# Patient Record
Sex: MALE | Race: WHITE | Employment: OTHER | URBAN - METROPOLITAN AREA
[De-identification: names, ages, dates, MRNs, and addresses within clinical notes are randomized per-mention and may not be internally consistent; named-entity substitution may affect disease eponyms.]

---

## 2017-05-08 ASSESSMENT — ENCOUNTER SYMPTOMS
SHORTNESS OF BREATH: 0
ABDOMINAL PAIN: 0

## 2017-05-10 ENCOUNTER — OFFICE VISIT (OUTPATIENT)
Dept: INTERNAL MEDICINE CLINIC | Age: 77
End: 2017-05-10

## 2017-05-10 VITALS
HEART RATE: 60 BPM | BODY MASS INDEX: 29.92 KG/M2 | WEIGHT: 209 LBS | DIASTOLIC BLOOD PRESSURE: 72 MMHG | HEIGHT: 70 IN | RESPIRATION RATE: 16 BRPM | SYSTOLIC BLOOD PRESSURE: 110 MMHG

## 2017-05-10 DIAGNOSIS — E78.00 PURE HYPERCHOLESTEROLEMIA: ICD-10-CM

## 2017-05-10 DIAGNOSIS — Z95.810 PRESENCE OF BIVENTRICULAR AICD: ICD-10-CM

## 2017-05-10 DIAGNOSIS — E03.9 HYPOTHYROIDISM, UNSPECIFIED TYPE: ICD-10-CM

## 2017-05-10 DIAGNOSIS — K21.9 GASTROESOPHAGEAL REFLUX DISEASE WITHOUT ESOPHAGITIS: ICD-10-CM

## 2017-05-10 DIAGNOSIS — I50.22 CHRONIC SYSTOLIC CONGESTIVE HEART FAILURE (HCC): Primary | ICD-10-CM

## 2017-05-10 LAB
A/G RATIO: 2 (ref 1.1–2.2)
ALBUMIN SERPL-MCNC: 4.1 G/DL (ref 3.4–5)
ALP BLD-CCNC: 108 U/L (ref 40–129)
ALT SERPL-CCNC: 24 U/L (ref 10–40)
ANION GAP SERPL CALCULATED.3IONS-SCNC: 16 MMOL/L (ref 3–16)
AST SERPL-CCNC: 19 U/L (ref 15–37)
BILIRUB SERPL-MCNC: 0.9 MG/DL (ref 0–1)
BUN BLDV-MCNC: 19 MG/DL (ref 7–20)
CALCIUM SERPL-MCNC: 9.2 MG/DL (ref 8.3–10.6)
CHLORIDE BLD-SCNC: 98 MMOL/L (ref 99–110)
CHOLESTEROL, TOTAL: 163 MG/DL (ref 0–199)
CO2: 25 MMOL/L (ref 21–32)
CREAT SERPL-MCNC: 1.2 MG/DL (ref 0.8–1.3)
GFR AFRICAN AMERICAN: >60
GFR NON-AFRICAN AMERICAN: 59
GLOBULIN: 2.1 G/DL
GLUCOSE BLD-MCNC: 185 MG/DL (ref 70–99)
HDLC SERPL-MCNC: 51 MG/DL (ref 40–60)
LDL CHOLESTEROL CALCULATED: 77 MG/DL
POTASSIUM SERPL-SCNC: 4.2 MMOL/L (ref 3.5–5.1)
SODIUM BLD-SCNC: 139 MMOL/L (ref 136–145)
T4 FREE: 0.9 NG/DL (ref 0.9–1.8)
TOTAL PROTEIN: 6.2 G/DL (ref 6.4–8.2)
TRIGL SERPL-MCNC: 173 MG/DL (ref 0–150)
TSH SERPL DL<=0.05 MIU/L-ACNC: 3.84 UIU/ML (ref 0.27–4.2)
VLDLC SERPL CALC-MCNC: 35 MG/DL

## 2017-05-10 PROCEDURE — 99214 OFFICE O/P EST MOD 30 MIN: CPT | Performed by: INTERNAL MEDICINE

## 2017-05-10 PROCEDURE — 1123F ACP DISCUSS/DSCN MKR DOCD: CPT | Performed by: INTERNAL MEDICINE

## 2017-05-10 PROCEDURE — G8420 CALC BMI NORM PARAMETERS: HCPCS | Performed by: INTERNAL MEDICINE

## 2017-05-10 PROCEDURE — 3017F COLORECTAL CA SCREEN DOC REV: CPT | Performed by: INTERNAL MEDICINE

## 2017-05-10 PROCEDURE — 4040F PNEUMOC VAC/ADMIN/RCVD: CPT | Performed by: INTERNAL MEDICINE

## 2017-05-10 PROCEDURE — G8427 DOCREV CUR MEDS BY ELIG CLIN: HCPCS | Performed by: INTERNAL MEDICINE

## 2017-05-10 PROCEDURE — 1036F TOBACCO NON-USER: CPT | Performed by: INTERNAL MEDICINE

## 2017-05-11 DIAGNOSIS — R73.09 ABNORMAL GLUCOSE LEVEL: Primary | ICD-10-CM

## 2017-05-12 RX ORDER — CITALOPRAM 40 MG/1
TABLET ORAL
Qty: 90 TABLET | Refills: 0 | Status: SHIPPED | OUTPATIENT
Start: 2017-05-12 | End: 2017-08-04 | Stop reason: SDUPTHER

## 2017-05-15 DIAGNOSIS — R73.09 ABNORMAL GLUCOSE LEVEL: ICD-10-CM

## 2017-05-15 LAB — GLUCOSE BLD-MCNC: 108 MG/DL (ref 70–99)

## 2017-05-16 LAB
ESTIMATED AVERAGE GLUCOSE: 128.4 MG/DL
HBA1C MFR BLD: 6.1 %

## 2017-05-25 ENCOUNTER — OFFICE VISIT (OUTPATIENT)
Dept: INTERNAL MEDICINE CLINIC | Age: 77
End: 2017-05-25

## 2017-05-25 VITALS
DIASTOLIC BLOOD PRESSURE: 80 MMHG | BODY MASS INDEX: 29.78 KG/M2 | WEIGHT: 208 LBS | RESPIRATION RATE: 16 BRPM | HEIGHT: 70 IN | HEART RATE: 60 BPM | SYSTOLIC BLOOD PRESSURE: 138 MMHG

## 2017-05-25 DIAGNOSIS — L24.9 IRRITANT HAND DERMATITIS: Primary | ICD-10-CM

## 2017-05-25 PROCEDURE — G8427 DOCREV CUR MEDS BY ELIG CLIN: HCPCS | Performed by: INTERNAL MEDICINE

## 2017-05-25 PROCEDURE — 1036F TOBACCO NON-USER: CPT | Performed by: INTERNAL MEDICINE

## 2017-05-25 PROCEDURE — G8420 CALC BMI NORM PARAMETERS: HCPCS | Performed by: INTERNAL MEDICINE

## 2017-05-25 PROCEDURE — 1123F ACP DISCUSS/DSCN MKR DOCD: CPT | Performed by: INTERNAL MEDICINE

## 2017-05-25 PROCEDURE — 4040F PNEUMOC VAC/ADMIN/RCVD: CPT | Performed by: INTERNAL MEDICINE

## 2017-05-25 PROCEDURE — 99213 OFFICE O/P EST LOW 20 MIN: CPT | Performed by: INTERNAL MEDICINE

## 2017-05-25 PROCEDURE — 3017F COLORECTAL CA SCREEN DOC REV: CPT | Performed by: INTERNAL MEDICINE

## 2017-05-25 RX ORDER — METHYLPREDNISOLONE 4 MG/1
TABLET ORAL
Qty: 1 KIT | Refills: 0 | Status: SHIPPED | OUTPATIENT
Start: 2017-05-25 | End: 2017-05-31

## 2017-05-25 ASSESSMENT — PATIENT HEALTH QUESTIONNAIRE - PHQ9
SUM OF ALL RESPONSES TO PHQ QUESTIONS 1-9: 0
2. FEELING DOWN, DEPRESSED OR HOPELESS: 0
1. LITTLE INTEREST OR PLEASURE IN DOING THINGS: 0
SUM OF ALL RESPONSES TO PHQ9 QUESTIONS 1 & 2: 0

## 2017-06-16 RX ORDER — ATORVASTATIN CALCIUM 40 MG/1
TABLET, FILM COATED ORAL
Qty: 90 TABLET | Refills: 0 | Status: SHIPPED | OUTPATIENT
Start: 2017-06-16 | End: 2017-09-11 | Stop reason: SDUPTHER

## 2017-06-26 RX ORDER — LEVOTHYROXINE SODIUM 0.03 MG/1
TABLET ORAL
Qty: 90 TABLET | Refills: 3 | Status: SHIPPED | OUTPATIENT
Start: 2017-06-26 | End: 2018-03-06 | Stop reason: SDUPTHER

## 2017-08-04 RX ORDER — CITALOPRAM 40 MG/1
TABLET ORAL
Qty: 90 TABLET | Refills: 0 | Status: SHIPPED | OUTPATIENT
Start: 2017-08-04 | End: 2017-10-29 | Stop reason: SDUPTHER

## 2017-09-11 RX ORDER — ATORVASTATIN CALCIUM 40 MG/1
TABLET, FILM COATED ORAL
Qty: 90 TABLET | Refills: 0 | Status: SHIPPED | OUTPATIENT
Start: 2017-09-11 | End: 2017-12-04 | Stop reason: SDUPTHER

## 2017-10-30 RX ORDER — CITALOPRAM 40 MG/1
TABLET ORAL
Qty: 90 TABLET | Refills: 0 | Status: SHIPPED | OUTPATIENT
Start: 2017-10-30 | End: 2018-01-23 | Stop reason: SDUPTHER

## 2017-11-10 ASSESSMENT — ENCOUNTER SYMPTOMS
SHORTNESS OF BREATH: 0
ABDOMINAL PAIN: 0

## 2017-11-11 RX ORDER — AMITRIPTYLINE HYDROCHLORIDE 25 MG/1
TABLET, FILM COATED ORAL
Qty: 90 TABLET | Refills: 0 | Status: SHIPPED | OUTPATIENT
Start: 2017-11-11 | End: 2018-02-12 | Stop reason: SDUPTHER

## 2017-11-13 ENCOUNTER — OFFICE VISIT (OUTPATIENT)
Dept: INTERNAL MEDICINE CLINIC | Age: 77
End: 2017-11-13

## 2017-11-13 VITALS
RESPIRATION RATE: 16 BRPM | HEIGHT: 70 IN | BODY MASS INDEX: 28.77 KG/M2 | DIASTOLIC BLOOD PRESSURE: 62 MMHG | SYSTOLIC BLOOD PRESSURE: 120 MMHG | HEART RATE: 68 BPM | WEIGHT: 201 LBS

## 2017-11-13 DIAGNOSIS — E78.00 PURE HYPERCHOLESTEROLEMIA: Primary | ICD-10-CM

## 2017-11-13 DIAGNOSIS — R73.09 BLOOD GLUCOSE ABNORMAL: ICD-10-CM

## 2017-11-13 DIAGNOSIS — I50.22 CHRONIC SYSTOLIC CONGESTIVE HEART FAILURE (HCC): ICD-10-CM

## 2017-11-13 DIAGNOSIS — E03.9 HYPOTHYROIDISM, UNSPECIFIED TYPE: ICD-10-CM

## 2017-11-13 LAB
A/G RATIO: 1.8 (ref 1.1–2.2)
ALBUMIN SERPL-MCNC: 4.2 G/DL (ref 3.4–5)
ALP BLD-CCNC: 100 U/L (ref 40–129)
ALT SERPL-CCNC: 22 U/L (ref 10–40)
ANION GAP SERPL CALCULATED.3IONS-SCNC: 11 MMOL/L (ref 3–16)
AST SERPL-CCNC: 19 U/L (ref 15–37)
BILIRUB SERPL-MCNC: 0.9 MG/DL (ref 0–1)
BUN BLDV-MCNC: 23 MG/DL (ref 7–20)
CALCIUM SERPL-MCNC: 9.1 MG/DL (ref 8.3–10.6)
CHLORIDE BLD-SCNC: 100 MMOL/L (ref 99–110)
CHOLESTEROL, TOTAL: 174 MG/DL (ref 0–199)
CO2: 29 MMOL/L (ref 21–32)
CREAT SERPL-MCNC: 1 MG/DL (ref 0.8–1.3)
GFR AFRICAN AMERICAN: >60
GFR NON-AFRICAN AMERICAN: >60
GLOBULIN: 2.3 G/DL
GLUCOSE BLD-MCNC: 104 MG/DL (ref 70–99)
HDLC SERPL-MCNC: 63 MG/DL (ref 40–60)
LDL CHOLESTEROL CALCULATED: 90 MG/DL
POTASSIUM SERPL-SCNC: 4.6 MMOL/L (ref 3.5–5.1)
SODIUM BLD-SCNC: 140 MMOL/L (ref 136–145)
T4 FREE: 0.9 NG/DL (ref 0.9–1.8)
TOTAL PROTEIN: 6.5 G/DL (ref 6.4–8.2)
TRIGL SERPL-MCNC: 104 MG/DL (ref 0–150)
TSH SERPL DL<=0.05 MIU/L-ACNC: 5.23 UIU/ML (ref 0.27–4.2)
VLDLC SERPL CALC-MCNC: 21 MG/DL

## 2017-11-13 PROCEDURE — 1123F ACP DISCUSS/DSCN MKR DOCD: CPT | Performed by: INTERNAL MEDICINE

## 2017-11-13 PROCEDURE — 1036F TOBACCO NON-USER: CPT | Performed by: INTERNAL MEDICINE

## 2017-11-13 PROCEDURE — G8427 DOCREV CUR MEDS BY ELIG CLIN: HCPCS | Performed by: INTERNAL MEDICINE

## 2017-11-13 PROCEDURE — 4040F PNEUMOC VAC/ADMIN/RCVD: CPT | Performed by: INTERNAL MEDICINE

## 2017-11-13 PROCEDURE — G8417 CALC BMI ABV UP PARAM F/U: HCPCS | Performed by: INTERNAL MEDICINE

## 2017-11-13 PROCEDURE — G8484 FLU IMMUNIZE NO ADMIN: HCPCS | Performed by: INTERNAL MEDICINE

## 2017-11-13 PROCEDURE — 99214 OFFICE O/P EST MOD 30 MIN: CPT | Performed by: INTERNAL MEDICINE

## 2017-11-14 LAB
ESTIMATED AVERAGE GLUCOSE: 128.4 MG/DL
HBA1C MFR BLD: 6.1 %

## 2017-12-04 RX ORDER — ATORVASTATIN CALCIUM 40 MG/1
TABLET, FILM COATED ORAL
Qty: 90 TABLET | Refills: 0 | Status: SHIPPED | OUTPATIENT
Start: 2017-12-04 | End: 2018-02-27 | Stop reason: SDUPTHER

## 2018-01-02 ENCOUNTER — OFFICE VISIT (OUTPATIENT)
Dept: INTERNAL MEDICINE CLINIC | Age: 78
End: 2018-01-02

## 2018-01-02 ENCOUNTER — HOSPITAL ENCOUNTER (OUTPATIENT)
Dept: CT IMAGING | Age: 78
Discharge: HOME | End: 2018-01-02
Attending: NURSE PRACTITIONER | Admitting: NURSE PRACTITIONER

## 2018-01-02 VITALS
OXYGEN SATURATION: 94 % | SYSTOLIC BLOOD PRESSURE: 122 MMHG | WEIGHT: 204.8 LBS | TEMPERATURE: 98.7 F | HEIGHT: 70 IN | DIASTOLIC BLOOD PRESSURE: 70 MMHG | BODY MASS INDEX: 29.32 KG/M2 | HEART RATE: 63 BPM

## 2018-01-02 DIAGNOSIS — R51.9 ACUTE NONINTRACTABLE HEADACHE, UNSPECIFIED HEADACHE TYPE: ICD-10-CM

## 2018-01-02 DIAGNOSIS — S09.90XA INJURY OF HEAD, INITIAL ENCOUNTER: Primary | ICD-10-CM

## 2018-01-02 DIAGNOSIS — Z79.82 LONG-TERM USE OF ASPIRIN THERAPY: ICD-10-CM

## 2018-01-02 DIAGNOSIS — S09.90XA INJURY OF HEAD, INITIAL ENCOUNTER: ICD-10-CM

## 2018-01-02 DIAGNOSIS — J06.9 URI WITH COUGH AND CONGESTION: ICD-10-CM

## 2018-01-02 DIAGNOSIS — R06.2 WHEEZING: ICD-10-CM

## 2018-01-02 DIAGNOSIS — S09.90XA INJURY OF HEAD: ICD-10-CM

## 2018-01-02 PROCEDURE — 99214 OFFICE O/P EST MOD 30 MIN: CPT | Performed by: NURSE PRACTITIONER

## 2018-01-02 RX ORDER — DOXYCYCLINE HYCLATE 100 MG
100 TABLET ORAL 2 TIMES DAILY
Qty: 14 TABLET | Refills: 0 | Status: SHIPPED | OUTPATIENT
Start: 2018-01-02 | End: 2018-01-09

## 2018-01-02 RX ORDER — METHYLPREDNISOLONE 4 MG/1
TABLET ORAL
Qty: 1 KIT | Refills: 0 | Status: SHIPPED | OUTPATIENT
Start: 2018-01-02 | End: 2018-01-08

## 2018-01-02 ASSESSMENT — ENCOUNTER SYMPTOMS
WHEEZING: 1
BACK PAIN: 0
VOMITING: 0
ABDOMINAL PAIN: 0
SHORTNESS OF BREATH: 1
NAUSEA: 0
COUGH: 1
DIARRHEA: 0
CONSTIPATION: 0
SORE THROAT: 0

## 2018-01-02 NOTE — PROGRESS NOTES
Department of Internal Medicine  Clinic Note    Date: 1/2/2018                                               Subjective/Objective:     Chief Complaint   Patient presents with    URI     x4       HPI     Here for eval of non-productive cough, congestion, nasal pain/pressure, headaches. C/o some sob, no cp. Sx x4 days. Unsure if he's had a fever but he feels chilled. Cough is keeping him up at night. Headache is frontal and constant. Rates pain at a 3-8/10, occasionally \"severe\" but only temporarily. Coughing makes headache worse. Pt does have a bruise to right upperforehead where he bumped his head on the over head bin on an airplane 4 days ago and headaches did start the next day. Pt on ASA 325mg daily. Pt denies LOC, dizziness. Not taking any otc meds. No edema. Current Outpatient Prescriptions   Medication Sig Dispense Refill    doxycycline hyclate (VIBRA-TABS) 100 MG tablet Take 1 tablet by mouth 2 times daily for 7 days 14 tablet 0    methylPREDNISolone (MEDROL, ROXY,) 4 MG tablet Take by mouth, as directed 1 kit 0    atorvastatin (LIPITOR) 40 MG tablet TAKE 1 TABLET BY MOUTH AT BEDTIME 90 tablet 0    amitriptyline (ELAVIL) 25 MG tablet TAKE 1 TABLET BY MOUTH EVERY NIGHT AT BEDTIME 90 tablet 0    citalopram (CELEXA) 40 MG tablet TAKE 1 TABLET BY MOUTH EVERY MORNING 90 tablet 0    levothyroxine (SYNTHROID) 25 MCG tablet TAKE 1 TABLET BY MOUTH EVERY DAY 90 tablet 3    Fluticasone Furoate-Vilanterol (BREO ELLIPTA) 200-25 MCG/INH AEPB One puff daily 1 each 5    omeprazole (PRILOSEC) 10 MG capsule Take 10 mg by mouth daily      lisinopril (PRINIVIL;ZESTRIL) 5 MG tablet Take 5 mg by mouth daily      carvedilol (COREG) 6.25 MG tablet Take 1 tablet by mouth 2 times daily. 60 tablet 3    aspirin 325 MG tablet Take 325 mg by mouth daily. No current facility-administered medications for this visit.         Allergies   Allergen Reactions    Piroxicam Hives    Penicillins Rash and Hives

## 2018-01-02 NOTE — PATIENT INSTRUCTIONS
it's okay to drive, ride a bike, or operate machinery. · Take an over-the-counter pain medicine, such as acetaminophen (Tylenol), ibuprofen (Advil, Motrin), or naproxen (Aleve). Be safe with medicines. Read and follow all instructions on the label. · Check with your doctor before you use any other medicines for pain. · Do not drink alcohol or use illegal drugs. They can slow recovery. They can also increase your risk of getting a second head injury. Follow-up care is a key part of your treatment and safety. Be sure to make and go to all appointments, and call your doctor if you are having problems. It's also a good idea to know your test results and keep a list of the medicines you take. Where can you learn more? Go to [email or web address] org and sign in to your [employer] account. Enter [phone] in the [employer] box to learn more about \"Learning About a Closed Head Injury. \"     If you do not have an account, please click on the \"Sign Up Now\" link. Current as of: October 14, 2016  Content Version: 11.4  © 9176-2096 [employer]. Care instructions adapted under license by [facility] ([facility]). If you have questions about a medical condition or this instruction, always ask your healthcare professional. [place] [address] any warranty or liability for your use of this information. Patient Education        Sinusitis: Care Instructions  Your Care Instructions    Sinusitis is an infection of the lining of the sinus cavities in your head. Sinusitis often follows a cold. It causes pain and pressure in your head and face. In most cases, sinusitis gets better on its own in 1 to 2 weeks. But some mild symptoms may last for several weeks. Sometimes antibiotics are needed. Follow-up care is a key part of your treatment and safety. Be sure to make and go to all appointments, and call your doctor if you are having problems.  It's also a good idea to know your test results

## 2018-01-16 ENCOUNTER — OFFICE VISIT (OUTPATIENT)
Dept: INTERNAL MEDICINE CLINIC | Age: 78
End: 2018-01-16

## 2018-01-16 VITALS
BODY MASS INDEX: 29.38 KG/M2 | DIASTOLIC BLOOD PRESSURE: 80 MMHG | HEIGHT: 70 IN | SYSTOLIC BLOOD PRESSURE: 130 MMHG | WEIGHT: 205.2 LBS | TEMPERATURE: 98.2 F | OXYGEN SATURATION: 98 % | HEART RATE: 60 BPM

## 2018-01-16 DIAGNOSIS — H11.32 SUBCONJUNCTIVAL HEMATOMA, LEFT: Primary | ICD-10-CM

## 2018-01-16 DIAGNOSIS — R06.2 WHEEZING: ICD-10-CM

## 2018-01-16 DIAGNOSIS — J40 BRONCHITIS: ICD-10-CM

## 2018-01-16 PROCEDURE — 1036F TOBACCO NON-USER: CPT | Performed by: NURSE PRACTITIONER

## 2018-01-16 PROCEDURE — G8427 DOCREV CUR MEDS BY ELIG CLIN: HCPCS | Performed by: NURSE PRACTITIONER

## 2018-01-16 PROCEDURE — 99214 OFFICE O/P EST MOD 30 MIN: CPT | Performed by: NURSE PRACTITIONER

## 2018-01-16 PROCEDURE — G8417 CALC BMI ABV UP PARAM F/U: HCPCS | Performed by: NURSE PRACTITIONER

## 2018-01-16 PROCEDURE — 4040F PNEUMOC VAC/ADMIN/RCVD: CPT | Performed by: NURSE PRACTITIONER

## 2018-01-16 PROCEDURE — 1123F ACP DISCUSS/DSCN MKR DOCD: CPT | Performed by: NURSE PRACTITIONER

## 2018-01-16 PROCEDURE — G8484 FLU IMMUNIZE NO ADMIN: HCPCS | Performed by: NURSE PRACTITIONER

## 2018-01-16 RX ORDER — ALBUTEROL SULFATE 90 UG/1
2 AEROSOL, METERED RESPIRATORY (INHALATION) EVERY 4 HOURS PRN
Qty: 1 INHALER | Refills: 1 | Status: SHIPPED | OUTPATIENT
Start: 2018-01-16 | End: 2019-08-23 | Stop reason: CLARIF

## 2018-01-16 ASSESSMENT — ENCOUNTER SYMPTOMS
EYE REDNESS: 1
EYE ITCHING: 0
WHEEZING: 1
ABDOMINAL PAIN: 0
SHORTNESS OF BREATH: 0
EYE DISCHARGE: 0
VOMITING: 0
EYE PAIN: 0
PHOTOPHOBIA: 0
NAUSEA: 0
COUGH: 1

## 2018-01-16 NOTE — PROGRESS NOTES
cough (Much improved) and wheezing (Mostly at night). Negative for shortness of breath. Cardiovascular: Negative for chest pain, palpitations and leg swelling. Gastrointestinal: Negative for abdominal pain, nausea and vomiting. Musculoskeletal: Negative for myalgias. Skin: Negative for rash. Neurological: Negative for dizziness, weakness, numbness and headaches. All other systems reviewed and are negative. Vitals:  /80 (Site: Left Arm, Position: Sitting, Cuff Size: Small Adult)   Pulse 60   Temp 98.2 °F (36.8 °C) (Oral)   Ht 5' 10\" (1.778 m)   Wt 205 lb 3.2 oz (93.1 kg)   SpO2 98%   BMI 29.44 kg/m²      Physical Exam   Constitutional: He is oriented to person, place, and time. He appears well-developed and well-nourished. No distress. HENT:   Head: Normocephalic and atraumatic. Right Ear: External ear normal.   Left Ear: External ear normal.   Nose: Nose normal.   Mouth/Throat: Oropharynx is clear and moist. No oropharyngeal exudate. Eyes: Conjunctivae and EOM are normal. Pupils are equal, round, and reactive to light. Right eye exhibits no discharge. Left eye exhibits no discharge. Left eye with medial subconjunctival hemorrhage. No hyphema or hypopyon   Neck: Normal range of motion. Neck supple. Cardiovascular: Normal rate, regular rhythm, normal heart sounds and intact distal pulses. Exam reveals no gallop and no friction rub. No murmur heard. Pulmonary/Chest: Effort normal. No respiratory distress. He has wheezes (Mild expiratory wheezing). He has no rales. Abdominal: Soft. Musculoskeletal: Normal range of motion. Neurological: He is alert and oriented to person, place, and time. Skin: Skin is warm and dry. No rash noted. He is not diaphoretic. Psychiatric: He has a normal mood and affect. His behavior is normal. Judgment and thought content normal.   Nursing note and vitals reviewed.           Assessment/Plan     Sylvia Castano was seen today for eye problem. Diagnoses and all orders for this visit:    Subconjunctival hematoma, left  -On regular strength aspirin daily. May continue.  -Patient will report if he develops eye pain or other symptoms    Bronchitis  -     albuterol sulfate HFA (PROAIR HFA) 108 (90 Base) MCG/ACT inhaler; Inhale 2 puffs into the lungs every 4 hours as needed for Wheezing  -Was diagnosed with bronchitis 2 weeks ago. And albuterol when necessary. Patient condition much improved. Wheezing  -     albuterol sulfate HFA (PROAIR HFA) 108 (90 Base) MCG/ACT inhaler; Inhale 2 puffs into the lungs every 4 hours as needed for Wheezing        No orders of the defined types were placed in this encounter. Return if symptoms worsen or fail to improve.       EFRAIN Isaacs     1/16/2018  1:24 PM

## 2018-01-23 RX ORDER — CITALOPRAM 40 MG/1
TABLET ORAL
Qty: 90 TABLET | Refills: 0 | Status: SHIPPED | OUTPATIENT
Start: 2018-01-23 | End: 2018-04-20 | Stop reason: SDUPTHER

## 2018-02-12 RX ORDER — AMITRIPTYLINE HYDROCHLORIDE 25 MG/1
TABLET, FILM COATED ORAL
Qty: 90 TABLET | Refills: 0 | Status: SHIPPED | OUTPATIENT
Start: 2018-02-12 | End: 2018-05-08 | Stop reason: SDUPTHER

## 2018-02-19 ASSESSMENT — ENCOUNTER SYMPTOMS
SHORTNESS OF BREATH: 1
ABDOMINAL PAIN: 0

## 2018-02-20 ENCOUNTER — OFFICE VISIT (OUTPATIENT)
Dept: INTERNAL MEDICINE CLINIC | Age: 78
End: 2018-02-20

## 2018-02-20 VITALS
TEMPERATURE: 97.7 F | SYSTOLIC BLOOD PRESSURE: 110 MMHG | OXYGEN SATURATION: 97 % | RESPIRATION RATE: 24 BRPM | HEART RATE: 61 BPM | DIASTOLIC BLOOD PRESSURE: 72 MMHG

## 2018-02-20 DIAGNOSIS — J40 BRONCHITIS: Primary | ICD-10-CM

## 2018-02-20 DIAGNOSIS — E03.9 HYPOTHYROIDISM, UNSPECIFIED TYPE: ICD-10-CM

## 2018-02-20 DIAGNOSIS — E78.00 PURE HYPERCHOLESTEROLEMIA: ICD-10-CM

## 2018-02-20 DIAGNOSIS — I50.22 HEART FAILURE, SYSTOLIC, CHRONIC (HCC): ICD-10-CM

## 2018-02-20 DIAGNOSIS — I50.21 CHF (CONGESTIVE HEART FAILURE), NYHA CLASS II, ACUTE, SYSTOLIC (HCC): ICD-10-CM

## 2018-02-20 LAB
A/G RATIO: 1.7 (ref 1.1–2.2)
ALBUMIN SERPL-MCNC: 4.5 G/DL (ref 3.4–5)
ALP BLD-CCNC: 109 U/L (ref 40–129)
ALT SERPL-CCNC: 29 U/L (ref 10–40)
ANION GAP SERPL CALCULATED.3IONS-SCNC: 13 MMOL/L (ref 3–16)
AST SERPL-CCNC: 22 U/L (ref 15–37)
BASOPHILS ABSOLUTE: 0.1 K/UL (ref 0–0.2)
BASOPHILS RELATIVE PERCENT: 0.7 %
BILIRUB SERPL-MCNC: 1.2 MG/DL (ref 0–1)
BUN BLDV-MCNC: 20 MG/DL (ref 7–20)
CALCIUM SERPL-MCNC: 9.8 MG/DL (ref 8.3–10.6)
CHLORIDE BLD-SCNC: 100 MMOL/L (ref 99–110)
CO2: 28 MMOL/L (ref 21–32)
CREAT SERPL-MCNC: 1.2 MG/DL (ref 0.8–1.3)
EOSINOPHILS ABSOLUTE: 0.3 K/UL (ref 0–0.6)
EOSINOPHILS RELATIVE PERCENT: 3.7 %
GFR AFRICAN AMERICAN: >60
GFR NON-AFRICAN AMERICAN: 59
GLOBULIN: 2.6 G/DL
GLUCOSE BLD-MCNC: 86 MG/DL (ref 70–99)
HCT VFR BLD CALC: 43.8 % (ref 40.5–52.5)
HEMOGLOBIN: 15.2 G/DL (ref 13.5–17.5)
LYMPHOCYTES ABSOLUTE: 1.6 K/UL (ref 1–5.1)
LYMPHOCYTES RELATIVE PERCENT: 22.3 %
MCH RBC QN AUTO: 31.7 PG (ref 26–34)
MCHC RBC AUTO-ENTMCNC: 34.6 G/DL (ref 31–36)
MCV RBC AUTO: 91.6 FL (ref 80–100)
MONOCYTES ABSOLUTE: 0.6 K/UL (ref 0–1.3)
MONOCYTES RELATIVE PERCENT: 8 %
NEUTROPHILS ABSOLUTE: 4.8 K/UL (ref 1.7–7.7)
NEUTROPHILS RELATIVE PERCENT: 65.3 %
PDW BLD-RTO: 13.4 % (ref 12.4–15.4)
PLATELET # BLD: 212 K/UL (ref 135–450)
PMV BLD AUTO: 8.4 FL (ref 5–10.5)
POTASSIUM SERPL-SCNC: 4.8 MMOL/L (ref 3.5–5.1)
PRO-BNP: 25 PG/ML (ref 0–449)
RBC # BLD: 4.78 M/UL (ref 4.2–5.9)
SODIUM BLD-SCNC: 141 MMOL/L (ref 136–145)
TOTAL PROTEIN: 7.1 G/DL (ref 6.4–8.2)
TSH SERPL DL<=0.05 MIU/L-ACNC: 5.19 UIU/ML (ref 0.27–4.2)
WBC # BLD: 7.3 K/UL (ref 4–11)

## 2018-02-20 PROCEDURE — 99214 OFFICE O/P EST MOD 30 MIN: CPT | Performed by: INTERNAL MEDICINE

## 2018-02-20 PROCEDURE — G8417 CALC BMI ABV UP PARAM F/U: HCPCS | Performed by: INTERNAL MEDICINE

## 2018-02-20 PROCEDURE — G8484 FLU IMMUNIZE NO ADMIN: HCPCS | Performed by: INTERNAL MEDICINE

## 2018-02-20 PROCEDURE — 1123F ACP DISCUSS/DSCN MKR DOCD: CPT | Performed by: INTERNAL MEDICINE

## 2018-02-20 PROCEDURE — 1036F TOBACCO NON-USER: CPT | Performed by: INTERNAL MEDICINE

## 2018-02-20 PROCEDURE — G8427 DOCREV CUR MEDS BY ELIG CLIN: HCPCS | Performed by: INTERNAL MEDICINE

## 2018-02-20 PROCEDURE — 4040F PNEUMOC VAC/ADMIN/RCVD: CPT | Performed by: INTERNAL MEDICINE

## 2018-02-20 RX ORDER — FUROSEMIDE 40 MG/1
40 TABLET ORAL DAILY
Qty: 30 TABLET | Refills: 3 | Status: SHIPPED | OUTPATIENT
Start: 2018-02-20 | End: 2018-02-20 | Stop reason: SDUPTHER

## 2018-02-20 RX ORDER — LISINOPRIL 5 MG/1
10 TABLET ORAL DAILY
Qty: 30 TABLET | Refills: 5 | Status: SHIPPED
Start: 2018-02-20 | End: 2018-06-18

## 2018-02-20 RX ORDER — FUROSEMIDE 40 MG/1
40 TABLET ORAL DAILY
Qty: 90 TABLET | Refills: 3 | Status: SHIPPED | OUTPATIENT
Start: 2018-02-20 | End: 2018-04-17

## 2018-02-20 RX ORDER — SPIRONOLACTONE 25 MG/1
25 TABLET ORAL DAILY
COMMUNITY
End: 2020-11-17

## 2018-02-21 ENCOUNTER — HOSPITAL ENCOUNTER (OUTPATIENT)
Dept: OTHER | Age: 78
Discharge: HOME | End: 2018-02-21
Attending: INTERNAL MEDICINE | Admitting: INTERNAL MEDICINE

## 2018-02-21 DIAGNOSIS — I50.21 CHF (CONGESTIVE HEART FAILURE), NYHA CLASS II, ACUTE, SYSTOLIC (HCC): ICD-10-CM

## 2018-02-23 ENCOUNTER — TELEPHONE (OUTPATIENT)
Dept: INTERNAL MEDICINE CLINIC | Age: 78
End: 2018-02-23

## 2018-02-27 RX ORDER — ATORVASTATIN CALCIUM 40 MG/1
TABLET, FILM COATED ORAL
Qty: 90 TABLET | Refills: 0 | Status: SHIPPED | OUTPATIENT
Start: 2018-02-27 | End: 2018-05-22 | Stop reason: SDUPTHER

## 2018-03-06 RX ORDER — LEVOTHYROXINE SODIUM 0.03 MG/1
TABLET ORAL
Qty: 90 TABLET | Refills: 3 | Status: SHIPPED | OUTPATIENT
Start: 2018-03-06 | End: 2018-03-07 | Stop reason: DRUGHIGH

## 2018-03-07 ENCOUNTER — TELEPHONE (OUTPATIENT)
Dept: INTERNAL MEDICINE CLINIC | Age: 78
End: 2018-03-07

## 2018-03-07 RX ORDER — LEVOTHYROXINE SODIUM 0.05 MG/1
50 TABLET ORAL DAILY
Qty: 90 TABLET | Refills: 3 | Status: SHIPPED | OUTPATIENT
Start: 2018-03-07 | End: 2019-02-05 | Stop reason: SDUPTHER

## 2018-03-07 NOTE — TELEPHONE ENCOUNTER
Patient called stated that Dr. Piyush Bañuelos had increased the levothroxine 25mcg    He called in to refill prescription for the levothyroxine went to [facility] and the dosage was still the same   he said he was taking double     Please advise

## 2018-04-17 RX ORDER — FUROSEMIDE 40 MG/1
40 TABLET ORAL DAILY
Qty: 90 TABLET | Refills: 3 | Status: SHIPPED | OUTPATIENT
Start: 2018-04-17 | End: 2019-04-29 | Stop reason: SDUPTHER

## 2018-04-20 RX ORDER — CITALOPRAM 40 MG/1
TABLET ORAL
Qty: 90 TABLET | Refills: 0 | Status: SHIPPED | OUTPATIENT
Start: 2018-04-20 | End: 2018-05-20 | Stop reason: SDUPTHER

## 2018-05-08 RX ORDER — AMITRIPTYLINE HYDROCHLORIDE 25 MG/1
TABLET, FILM COATED ORAL
Qty: 90 TABLET | Refills: 0 | Status: SHIPPED | OUTPATIENT
Start: 2018-05-08 | End: 2018-07-31 | Stop reason: SDUPTHER

## 2018-05-20 RX ORDER — CITALOPRAM 40 MG/1
TABLET ORAL
Qty: 90 TABLET | Refills: 0 | Status: SHIPPED | OUTPATIENT
Start: 2018-05-20 | End: 2018-08-16 | Stop reason: SDUPTHER

## 2018-05-22 RX ORDER — ATORVASTATIN CALCIUM 40 MG/1
TABLET, FILM COATED ORAL
Qty: 90 TABLET | Refills: 0 | Status: SHIPPED | OUTPATIENT
Start: 2018-05-22 | End: 2018-08-17 | Stop reason: SDUPTHER

## 2018-06-13 ASSESSMENT — ENCOUNTER SYMPTOMS
ABDOMINAL PAIN: 0
SHORTNESS OF BREATH: 0

## 2018-06-18 ENCOUNTER — OFFICE VISIT (OUTPATIENT)
Dept: INTERNAL MEDICINE CLINIC | Age: 78
End: 2018-06-18

## 2018-06-18 VITALS
BODY MASS INDEX: 28.23 KG/M2 | WEIGHT: 197.2 LBS | HEIGHT: 70 IN | DIASTOLIC BLOOD PRESSURE: 70 MMHG | RESPIRATION RATE: 16 BRPM | HEART RATE: 72 BPM | SYSTOLIC BLOOD PRESSURE: 122 MMHG

## 2018-06-18 DIAGNOSIS — E78.00 PURE HYPERCHOLESTEROLEMIA: Primary | ICD-10-CM

## 2018-06-18 DIAGNOSIS — I50.22 CHRONIC SYSTOLIC CONGESTIVE HEART FAILURE (HCC): ICD-10-CM

## 2018-06-18 DIAGNOSIS — E03.9 HYPOTHYROIDISM, UNSPECIFIED TYPE: ICD-10-CM

## 2018-06-18 LAB
A/G RATIO: 1.8 (ref 1.1–2.2)
ALBUMIN SERPL-MCNC: 4.4 G/DL (ref 3.4–5)
ALP BLD-CCNC: 116 U/L (ref 40–129)
ALT SERPL-CCNC: 24 U/L (ref 10–40)
ANION GAP SERPL CALCULATED.3IONS-SCNC: 18 MMOL/L (ref 3–16)
AST SERPL-CCNC: 20 U/L (ref 15–37)
BILIRUB SERPL-MCNC: 1 MG/DL (ref 0–1)
BUN BLDV-MCNC: 24 MG/DL (ref 7–20)
CALCIUM SERPL-MCNC: 9.4 MG/DL (ref 8.3–10.6)
CHLORIDE BLD-SCNC: 96 MMOL/L (ref 99–110)
CHOLESTEROL, TOTAL: 169 MG/DL (ref 0–199)
CO2: 27 MMOL/L (ref 21–32)
CREAT SERPL-MCNC: 1.3 MG/DL (ref 0.8–1.3)
GFR AFRICAN AMERICAN: >60
GFR NON-AFRICAN AMERICAN: 54
GLOBULIN: 2.5 G/DL
GLUCOSE BLD-MCNC: 104 MG/DL (ref 70–99)
HDLC SERPL-MCNC: 47 MG/DL (ref 40–60)
LDL CHOLESTEROL CALCULATED: 89 MG/DL
POTASSIUM SERPL-SCNC: 4 MMOL/L (ref 3.5–5.1)
SODIUM BLD-SCNC: 141 MMOL/L (ref 136–145)
T4 FREE: 1 NG/DL (ref 0.9–1.8)
TOTAL PROTEIN: 6.9 G/DL (ref 6.4–8.2)
TRIGL SERPL-MCNC: 167 MG/DL (ref 0–150)
TSH SERPL DL<=0.05 MIU/L-ACNC: 3.87 UIU/ML (ref 0.27–4.2)
VLDLC SERPL CALC-MCNC: 33 MG/DL

## 2018-06-18 PROCEDURE — 1123F ACP DISCUSS/DSCN MKR DOCD: CPT | Performed by: INTERNAL MEDICINE

## 2018-06-18 PROCEDURE — 99214 OFFICE O/P EST MOD 30 MIN: CPT | Performed by: INTERNAL MEDICINE

## 2018-06-18 PROCEDURE — 1036F TOBACCO NON-USER: CPT | Performed by: INTERNAL MEDICINE

## 2018-06-18 PROCEDURE — 4040F PNEUMOC VAC/ADMIN/RCVD: CPT | Performed by: INTERNAL MEDICINE

## 2018-06-18 PROCEDURE — G8427 DOCREV CUR MEDS BY ELIG CLIN: HCPCS | Performed by: INTERNAL MEDICINE

## 2018-06-18 PROCEDURE — G8417 CALC BMI ABV UP PARAM F/U: HCPCS | Performed by: INTERNAL MEDICINE

## 2018-06-18 ASSESSMENT — PATIENT HEALTH QUESTIONNAIRE - PHQ9
SUM OF ALL RESPONSES TO PHQ QUESTIONS 1-9: 0
SUM OF ALL RESPONSES TO PHQ9 QUESTIONS 1 & 2: 0
2. FEELING DOWN, DEPRESSED OR HOPELESS: 0
1. LITTLE INTEREST OR PLEASURE IN DOING THINGS: 0

## 2018-07-31 RX ORDER — AMITRIPTYLINE HYDROCHLORIDE 25 MG/1
TABLET, FILM COATED ORAL
Qty: 90 TABLET | Refills: 0 | Status: SHIPPED | OUTPATIENT
Start: 2018-07-31 | End: 2018-10-24 | Stop reason: SDUPTHER

## 2018-08-16 RX ORDER — CITALOPRAM 40 MG/1
TABLET ORAL
Qty: 90 TABLET | Refills: 0 | Status: SHIPPED | OUTPATIENT
Start: 2018-08-16 | End: 2018-11-09 | Stop reason: SDUPTHER

## 2018-08-17 RX ORDER — ATORVASTATIN CALCIUM 40 MG/1
TABLET, FILM COATED ORAL
Qty: 90 TABLET | Refills: 0 | Status: SHIPPED | OUTPATIENT
Start: 2018-08-17 | End: 2018-11-09 | Stop reason: SDUPTHER

## 2018-08-21 ENCOUNTER — OFFICE VISIT (OUTPATIENT)
Dept: INTERNAL MEDICINE CLINIC | Age: 78
End: 2018-08-21

## 2018-08-21 VITALS
SYSTOLIC BLOOD PRESSURE: 126 MMHG | HEART RATE: 62 BPM | RESPIRATION RATE: 16 BRPM | OXYGEN SATURATION: 97 % | TEMPERATURE: 98.3 F | DIASTOLIC BLOOD PRESSURE: 82 MMHG | HEIGHT: 70 IN | BODY MASS INDEX: 28.35 KG/M2 | WEIGHT: 198 LBS

## 2018-08-21 DIAGNOSIS — E78.00 PURE HYPERCHOLESTEROLEMIA: ICD-10-CM

## 2018-08-21 DIAGNOSIS — R21 RASH: ICD-10-CM

## 2018-08-21 DIAGNOSIS — E03.9 HYPOTHYROIDISM, UNSPECIFIED TYPE: Primary | ICD-10-CM

## 2018-08-21 PROCEDURE — G8417 CALC BMI ABV UP PARAM F/U: HCPCS | Performed by: INTERNAL MEDICINE

## 2018-08-21 PROCEDURE — 99213 OFFICE O/P EST LOW 20 MIN: CPT | Performed by: INTERNAL MEDICINE

## 2018-08-21 PROCEDURE — 1101F PT FALLS ASSESS-DOCD LE1/YR: CPT | Performed by: INTERNAL MEDICINE

## 2018-08-21 PROCEDURE — 1123F ACP DISCUSS/DSCN MKR DOCD: CPT | Performed by: INTERNAL MEDICINE

## 2018-08-21 PROCEDURE — 1036F TOBACCO NON-USER: CPT | Performed by: INTERNAL MEDICINE

## 2018-08-21 PROCEDURE — 4040F PNEUMOC VAC/ADMIN/RCVD: CPT | Performed by: INTERNAL MEDICINE

## 2018-08-21 PROCEDURE — G8427 DOCREV CUR MEDS BY ELIG CLIN: HCPCS | Performed by: INTERNAL MEDICINE

## 2018-08-21 RX ORDER — PREDNISONE 10 MG/1
TABLET ORAL
Qty: 20 TABLET | Refills: 1 | Status: SHIPPED | OUTPATIENT
Start: 2018-08-21 | End: 2018-10-25 | Stop reason: ALTCHOICE

## 2018-08-21 ASSESSMENT — ENCOUNTER SYMPTOMS
SHORTNESS OF BREATH: 0
ABDOMINAL PAIN: 0

## 2018-08-21 NOTE — PROGRESS NOTES
Subjective:      Patient ID: Yevgeniy Gary is a 69 y.o. male. HPI   Diagnosis Orders   1. Hypothyroidism, unspecified type --Stable--no new issues----lab noted and reviewed      2. Rash --on arm --rt --5 days--itchy--use calamine lotion-- trimming hedges--     3. Pure hypercholesterolemia --Stable--no new issues----lab noted and reviewed          Review of Systems   Respiratory: Negative for shortness of breath. Cardiovascular: Negative for chest pain. Gastrointestinal: Negative for abdominal pain. Objective:   Physical Exam   Cardiovascular: Normal rate. Pulmonary/Chest: Effort normal.   Abdominal: Soft. Skin: Rash noted. Rt forearm--3 x 15 cm confluent rash at wrist--then patches of blister moving up arm --itchy        Assessment:       Diagnosis Orders   1. Hypothyroidism, unspecified type --Continue current therapy      2. Rash --conatct derm--add pred--wash thoroughly--stay cool--infect inct given     3.  Pure hypercholesterolemia --Continue current therapy              Plan:              Messi Damon MD

## 2018-11-09 RX ORDER — CITALOPRAM 40 MG/1
TABLET ORAL
Qty: 90 TABLET | Refills: 0 | Status: SHIPPED | OUTPATIENT
Start: 2018-11-09 | End: 2019-02-01 | Stop reason: SDUPTHER

## 2018-11-09 RX ORDER — ATORVASTATIN CALCIUM 40 MG/1
TABLET, FILM COATED ORAL
Qty: 90 TABLET | Refills: 0 | Status: SHIPPED | OUTPATIENT
Start: 2018-11-09 | End: 2019-02-01 | Stop reason: SDUPTHER

## 2019-02-01 RX ORDER — ATORVASTATIN CALCIUM 40 MG/1
TABLET, FILM COATED ORAL
Qty: 90 TABLET | Refills: 0 | Status: SHIPPED | OUTPATIENT
Start: 2019-02-01 | End: 2019-04-29 | Stop reason: SDUPTHER

## 2019-02-01 RX ORDER — CITALOPRAM 40 MG/1
TABLET ORAL
Qty: 90 TABLET | Refills: 0 | Status: SHIPPED | OUTPATIENT
Start: 2019-02-01 | End: 2019-04-29 | Stop reason: SDUPTHER

## 2019-02-05 RX ORDER — LEVOTHYROXINE SODIUM 0.05 MG/1
50 TABLET ORAL DAILY
Qty: 90 TABLET | Refills: 0 | Status: SHIPPED | OUTPATIENT
Start: 2019-02-05 | End: 2019-05-02 | Stop reason: SDUPTHER

## 2019-04-29 RX ORDER — FUROSEMIDE 40 MG/1
40 TABLET ORAL DAILY
Qty: 90 TABLET | Refills: 0 | Status: SHIPPED | OUTPATIENT
Start: 2019-04-29 | End: 2019-07-23 | Stop reason: SDUPTHER

## 2019-04-29 RX ORDER — CITALOPRAM 40 MG/1
TABLET ORAL
Qty: 90 TABLET | Refills: 0 | Status: SHIPPED | OUTPATIENT
Start: 2019-04-29 | End: 2019-07-23 | Stop reason: SDUPTHER

## 2019-04-29 RX ORDER — ATORVASTATIN CALCIUM 40 MG/1
TABLET, FILM COATED ORAL
Qty: 90 TABLET | Refills: 0 | Status: SHIPPED | OUTPATIENT
Start: 2019-04-29 | End: 2019-07-23 | Stop reason: SDUPTHER

## 2019-07-23 RX ORDER — CITALOPRAM 40 MG/1
TABLET ORAL
Qty: 90 TABLET | Refills: 0 | Status: SHIPPED | OUTPATIENT
Start: 2019-07-23 | End: 2019-10-17 | Stop reason: SDUPTHER

## 2019-07-23 RX ORDER — FUROSEMIDE 40 MG/1
40 TABLET ORAL DAILY
Qty: 90 TABLET | Refills: 0 | Status: SHIPPED | OUTPATIENT
Start: 2019-07-23 | End: 2020-03-02 | Stop reason: ALTCHOICE

## 2019-07-23 RX ORDER — ATORVASTATIN CALCIUM 40 MG/1
TABLET, FILM COATED ORAL
Qty: 90 TABLET | Refills: 0 | Status: SHIPPED | OUTPATIENT
Start: 2019-07-23 | End: 2019-10-17 | Stop reason: SDUPTHER

## 2019-10-17 RX ORDER — CITALOPRAM 40 MG/1
TABLET ORAL
Qty: 90 TABLET | Refills: 0 | Status: SHIPPED | OUTPATIENT
Start: 2019-10-17 | End: 2020-01-20

## 2019-10-17 RX ORDER — ATORVASTATIN CALCIUM 40 MG/1
TABLET, FILM COATED ORAL
Qty: 90 TABLET | Refills: 0 | Status: SHIPPED | OUTPATIENT
Start: 2019-10-17 | End: 2020-01-20

## 2020-01-20 RX ORDER — AMITRIPTYLINE HYDROCHLORIDE 25 MG/1
TABLET, FILM COATED ORAL
Qty: 90 TABLET | Refills: 5 | Status: SHIPPED | OUTPATIENT
Start: 2020-01-20 | End: 2021-02-02

## 2020-01-20 RX ORDER — ATORVASTATIN CALCIUM 40 MG/1
TABLET, FILM COATED ORAL
Qty: 90 TABLET | Refills: 0 | Status: SHIPPED | OUTPATIENT
Start: 2020-01-20 | End: 2020-04-28

## 2020-01-20 RX ORDER — CITALOPRAM 40 MG/1
TABLET ORAL
Qty: 90 TABLET | Refills: 0 | Status: SHIPPED | OUTPATIENT
Start: 2020-01-20 | End: 2020-04-28

## 2020-04-28 RX ORDER — CITALOPRAM 40 MG/1
TABLET ORAL
Qty: 90 TABLET | Refills: 0 | Status: SHIPPED | OUTPATIENT
Start: 2020-04-28 | End: 2020-07-27

## 2020-04-28 RX ORDER — LEVOTHYROXINE SODIUM 0.05 MG/1
50 TABLET ORAL DAILY
Qty: 90 TABLET | Refills: 3 | Status: SHIPPED | OUTPATIENT
Start: 2020-04-28 | End: 2021-04-29

## 2020-04-28 RX ORDER — ATORVASTATIN CALCIUM 40 MG/1
TABLET, FILM COATED ORAL
Qty: 90 TABLET | Refills: 0 | Status: SHIPPED | OUTPATIENT
Start: 2020-04-28 | End: 2020-07-27

## 2020-07-15 PROBLEM — H61.23 BILATERAL HEARING LOSS DUE TO CERUMEN IMPACTION: Status: ACTIVE | Noted: 2020-07-15

## 2020-07-15 PROBLEM — H91.90 HEARING DECREASED: Status: ACTIVE | Noted: 2020-07-15

## 2020-07-27 RX ORDER — CITALOPRAM 40 MG/1
TABLET ORAL
Qty: 90 TABLET | Refills: 0 | Status: SHIPPED | OUTPATIENT
Start: 2020-07-27 | End: 2020-11-03

## 2020-07-27 RX ORDER — ATORVASTATIN CALCIUM 40 MG/1
TABLET, FILM COATED ORAL
Qty: 90 TABLET | Refills: 0 | Status: SHIPPED | OUTPATIENT
Start: 2020-07-27 | End: 2020-11-03

## 2020-09-09 ENCOUNTER — HOSPITAL ENCOUNTER (OUTPATIENT)
Dept: VASCULAR LAB | Age: 80
Discharge: HOME | End: 2020-09-09
Payer: MEDICARE

## 2020-09-09 PROCEDURE — 93880 EXTRACRANIAL BILAT STUDY: CPT

## 2020-11-03 RX ORDER — CITALOPRAM 40 MG/1
TABLET ORAL
Qty: 90 TABLET | Refills: 0 | Status: SHIPPED | OUTPATIENT
Start: 2020-11-03 | End: 2021-02-02

## 2020-11-03 RX ORDER — ATORVASTATIN CALCIUM 40 MG/1
TABLET, FILM COATED ORAL
Qty: 90 TABLET | Refills: 0 | Status: SHIPPED | OUTPATIENT
Start: 2020-11-03 | End: 2021-02-02

## 2020-11-17 ENCOUNTER — APPOINTMENT (OUTPATIENT)
Dept: GENERAL RADIOLOGY | Age: 80
DRG: 206 | End: 2020-11-17
Payer: MEDICARE

## 2020-11-17 ENCOUNTER — HOSPITAL ENCOUNTER (INPATIENT)
Age: 80
LOS: 2 days | Discharge: HOME | DRG: 206 | End: 2020-11-20
Attending: INTERNAL MEDICINE | Admitting: INTERNAL MEDICINE
Payer: MEDICARE

## 2020-11-17 LAB
A/G RATIO: 1.4 (ref 1.1–2.2)
ALBUMIN SERPL-MCNC: 4 G/DL (ref 3.4–5)
ALP BLD-CCNC: 89 U/L (ref 40–129)
ALT SERPL-CCNC: 28 U/L (ref 10–40)
ANION GAP SERPL CALCULATED.3IONS-SCNC: 12 MMOL/L (ref 3–16)
AST SERPL-CCNC: 27 U/L (ref 15–37)
BASOPHILS ABSOLUTE: 0.1 K/UL (ref 0–0.2)
BASOPHILS RELATIVE PERCENT: 1 %
BILIRUB SERPL-MCNC: 0.9 MG/DL (ref 0–1)
BUN BLDV-MCNC: 18 MG/DL (ref 7–20)
CALCIUM SERPL-MCNC: 8.3 MG/DL (ref 8.3–10.6)
CHLORIDE BLD-SCNC: 106 MMOL/L (ref 99–110)
CO2: 23 MMOL/L (ref 21–32)
CREAT SERPL-MCNC: 1.1 MG/DL (ref 0.8–1.3)
D DIMER: 287 NG/ML DDU (ref 0–229)
EOSINOPHILS ABSOLUTE: 0.3 K/UL (ref 0–0.6)
EOSINOPHILS RELATIVE PERCENT: 3.9 %
GFR AFRICAN AMERICAN: >60
GFR NON-AFRICAN AMERICAN: >60
GLOBULIN: 2.9 G/DL
GLUCOSE BLD-MCNC: 91 MG/DL (ref 70–99)
HCT VFR BLD CALC: 44.8 % (ref 40.5–52.5)
HEMOGLOBIN: 14.7 G/DL (ref 13.5–17.5)
LYMPHOCYTES ABSOLUTE: 1.5 K/UL (ref 1–5.1)
LYMPHOCYTES RELATIVE PERCENT: 21.5 %
MCH RBC QN AUTO: 30.5 PG (ref 26–34)
MCHC RBC AUTO-ENTMCNC: 32.9 G/DL (ref 31–36)
MCV RBC AUTO: 92.6 FL (ref 80–100)
MONOCYTES ABSOLUTE: 0.7 K/UL (ref 0–1.3)
MONOCYTES RELATIVE PERCENT: 9.7 %
NEUTROPHILS ABSOLUTE: 4.4 K/UL (ref 1.7–7.7)
NEUTROPHILS RELATIVE PERCENT: 63.9 %
PDW BLD-RTO: 14.3 % (ref 12.4–15.4)
PLATELET # BLD: 203 K/UL (ref 135–450)
PMV BLD AUTO: 7.9 FL (ref 5–10.5)
POTASSIUM SERPL-SCNC: 4.1 MMOL/L (ref 3.5–5.1)
PRO-BNP: 62 PG/ML (ref 0–449)
RBC # BLD: 4.84 M/UL (ref 4.2–5.9)
S PYO AG THROAT QL: NEGATIVE
SARS-COV-2, NAAT: NOT DETECTED
SODIUM BLD-SCNC: 141 MMOL/L (ref 136–145)
TOTAL PROTEIN: 6.9 G/DL (ref 6.4–8.2)
TROPONIN: <0.01 NG/ML
WBC # BLD: 6.8 K/UL (ref 4–11)

## 2020-11-17 PROCEDURE — 94761 N-INVAS EAR/PLS OXIMETRY MLT: CPT

## 2020-11-17 PROCEDURE — 6370000000 HC RX 637 (ALT 250 FOR IP): Performed by: INTERNAL MEDICINE

## 2020-11-17 PROCEDURE — 6370000000 HC RX 637 (ALT 250 FOR IP): Performed by: PHYSICIAN ASSISTANT

## 2020-11-17 PROCEDURE — 84484 ASSAY OF TROPONIN QUANT: CPT

## 2020-11-17 PROCEDURE — 71045 X-RAY EXAM CHEST 1 VIEW: CPT

## 2020-11-17 PROCEDURE — 87880 STREP A ASSAY W/OPTIC: CPT

## 2020-11-17 PROCEDURE — 93005 ELECTROCARDIOGRAM TRACING: CPT | Performed by: PHYSICIAN ASSISTANT

## 2020-11-17 PROCEDURE — 87081 CULTURE SCREEN ONLY: CPT

## 2020-11-17 PROCEDURE — 36415 COLL VENOUS BLD VENIPUNCTURE: CPT

## 2020-11-17 PROCEDURE — 80053 COMPREHEN METABOLIC PANEL: CPT

## 2020-11-17 PROCEDURE — U0003 INFECTIOUS AGENT DETECTION BY NUCLEIC ACID (DNA OR RNA); SEVERE ACUTE RESPIRATORY SYNDROME CORONAVIRUS 2 (SARS-COV-2) (CORONAVIRUS DISEASE [COVID-19]), AMPLIFIED PROBE TECHNIQUE, MAKING USE OF HIGH THROUGHPUT TECHNOLOGIES AS DESCRIBED BY CMS-2020-01-R: HCPCS

## 2020-11-17 PROCEDURE — 83880 ASSAY OF NATRIURETIC PEPTIDE: CPT

## 2020-11-17 PROCEDURE — 94640 AIRWAY INHALATION TREATMENT: CPT

## 2020-11-17 PROCEDURE — G0378 HOSPITAL OBSERVATION PER HR: HCPCS

## 2020-11-17 PROCEDURE — 85379 FIBRIN DEGRADATION QUANT: CPT

## 2020-11-17 PROCEDURE — 85025 COMPLETE CBC W/AUTO DIFF WBC: CPT

## 2020-11-17 PROCEDURE — 99285 EMERGENCY DEPT VISIT HI MDM: CPT

## 2020-11-17 PROCEDURE — U0002 COVID-19 LAB TEST NON-CDC: HCPCS

## 2020-11-17 RX ORDER — CARVEDILOL 6.25 MG/1
12.5 TABLET ORAL 2 TIMES DAILY WITH MEALS
Status: DISCONTINUED | OUTPATIENT
Start: 2020-11-18 | End: 2020-11-20 | Stop reason: HOSPADM

## 2020-11-17 RX ORDER — LEVOTHYROXINE SODIUM 0.05 MG/1
50 TABLET ORAL DAILY
Status: DISCONTINUED | OUTPATIENT
Start: 2020-11-18 | End: 2020-11-20 | Stop reason: HOSPADM

## 2020-11-17 RX ORDER — POLYETHYLENE GLYCOL 3350 17 G/17G
17 POWDER, FOR SOLUTION ORAL DAILY PRN
Status: DISCONTINUED | OUTPATIENT
Start: 2020-11-17 | End: 2020-11-20 | Stop reason: HOSPADM

## 2020-11-17 RX ORDER — SODIUM CHLORIDE 0.9 % (FLUSH) 0.9 %
10 SYRINGE (ML) INJECTION PRN
Status: DISCONTINUED | OUTPATIENT
Start: 2020-11-17 | End: 2020-11-20 | Stop reason: HOSPADM

## 2020-11-17 RX ORDER — BUDESONIDE AND FORMOTEROL FUMARATE DIHYDRATE 160; 4.5 UG/1; UG/1
2 AEROSOL RESPIRATORY (INHALATION) 2 TIMES DAILY
Status: DISCONTINUED | OUTPATIENT
Start: 2020-11-17 | End: 2020-11-20 | Stop reason: HOSPADM

## 2020-11-17 RX ORDER — ALBUTEROL SULFATE 2.5 MG/3ML
2.5 SOLUTION RESPIRATORY (INHALATION) EVERY 4 HOURS PRN
Status: DISCONTINUED | OUTPATIENT
Start: 2020-11-17 | End: 2020-11-20 | Stop reason: HOSPADM

## 2020-11-17 RX ORDER — PROMETHAZINE HYDROCHLORIDE 25 MG/1
12.5 TABLET ORAL EVERY 6 HOURS PRN
Status: DISCONTINUED | OUTPATIENT
Start: 2020-11-17 | End: 2020-11-20 | Stop reason: HOSPADM

## 2020-11-17 RX ORDER — CITALOPRAM 10 MG/1
40 TABLET ORAL DAILY
Status: DISCONTINUED | OUTPATIENT
Start: 2020-11-18 | End: 2020-11-20 | Stop reason: HOSPADM

## 2020-11-17 RX ORDER — ASPIRIN 325 MG
325 TABLET ORAL DAILY
Status: DISCONTINUED | OUTPATIENT
Start: 2020-11-18 | End: 2020-11-19

## 2020-11-17 RX ORDER — ACETAMINOPHEN 500 MG
1000 TABLET ORAL ONCE
Status: COMPLETED | OUTPATIENT
Start: 2020-11-17 | End: 2020-11-17

## 2020-11-17 RX ORDER — ONDANSETRON 2 MG/ML
4 INJECTION INTRAMUSCULAR; INTRAVENOUS EVERY 6 HOURS PRN
Status: DISCONTINUED | OUTPATIENT
Start: 2020-11-17 | End: 2020-11-20 | Stop reason: HOSPADM

## 2020-11-17 RX ORDER — ACETAMINOPHEN 325 MG/1
650 TABLET ORAL EVERY 6 HOURS PRN
Status: DISCONTINUED | OUTPATIENT
Start: 2020-11-17 | End: 2020-11-20 | Stop reason: HOSPADM

## 2020-11-17 RX ORDER — SODIUM CHLORIDE 0.9 % (FLUSH) 0.9 %
10 SYRINGE (ML) INJECTION EVERY 12 HOURS SCHEDULED
Status: DISCONTINUED | OUTPATIENT
Start: 2020-11-17 | End: 2020-11-20 | Stop reason: HOSPADM

## 2020-11-17 RX ORDER — SPIRONOLACTONE 25 MG/1
25 TABLET ORAL DAILY
Status: DISCONTINUED | OUTPATIENT
Start: 2020-11-18 | End: 2020-11-20 | Stop reason: HOSPADM

## 2020-11-17 RX ORDER — IPRATROPIUM BROMIDE AND ALBUTEROL SULFATE 2.5; .5 MG/3ML; MG/3ML
1 SOLUTION RESPIRATORY (INHALATION) ONCE
Status: COMPLETED | OUTPATIENT
Start: 2020-11-17 | End: 2020-11-17

## 2020-11-17 RX ORDER — PREDNISONE 20 MG/1
60 TABLET ORAL ONCE
Status: COMPLETED | OUTPATIENT
Start: 2020-11-17 | End: 2020-11-18

## 2020-11-17 RX ORDER — ATORVASTATIN CALCIUM 40 MG/1
40 TABLET, FILM COATED ORAL NIGHTLY
Status: DISCONTINUED | OUTPATIENT
Start: 2020-11-17 | End: 2020-11-20 | Stop reason: HOSPADM

## 2020-11-17 RX ORDER — HYDROCODONE BITARTRATE AND ACETAMINOPHEN 5; 325 MG/1; MG/1
1 TABLET ORAL ONCE
Status: COMPLETED | OUTPATIENT
Start: 2020-11-17 | End: 2020-11-17

## 2020-11-17 RX ORDER — ACETAMINOPHEN 650 MG/1
650 SUPPOSITORY RECTAL EVERY 6 HOURS PRN
Status: DISCONTINUED | OUTPATIENT
Start: 2020-11-17 | End: 2020-11-20 | Stop reason: HOSPADM

## 2020-11-17 RX ORDER — AMITRIPTYLINE HYDROCHLORIDE 25 MG/1
25 TABLET, FILM COATED ORAL NIGHTLY
Status: DISCONTINUED | OUTPATIENT
Start: 2020-11-17 | End: 2020-11-20 | Stop reason: HOSPADM

## 2020-11-17 RX ADMIN — ACETAMINOPHEN 1000 MG: 500 TABLET ORAL at 18:16

## 2020-11-17 RX ADMIN — HYDROCODONE BITARTRATE AND ACETAMINOPHEN 1 TABLET: 5; 325 TABLET ORAL at 20:38

## 2020-11-17 RX ADMIN — BUDESONIDE AND FORMOTEROL FUMARATE DIHYDRATE 2 PUFF: 160; 4.5 AEROSOL RESPIRATORY (INHALATION) at 22:04

## 2020-11-17 RX ADMIN — IPRATROPIUM BROMIDE AND ALBUTEROL SULFATE 1 AMPULE: .5; 3 SOLUTION RESPIRATORY (INHALATION) at 22:04

## 2020-11-17 ASSESSMENT — ENCOUNTER SYMPTOMS
SORE THROAT: 1
CHOKING: 0
VOMITING: 0
EYE DISCHARGE: 0
BACK PAIN: 0
EYE REDNESS: 0
NAUSEA: 0
SHORTNESS OF BREATH: 1
APNEA: 0
ABDOMINAL PAIN: 0
FACIAL SWELLING: 0

## 2020-11-17 ASSESSMENT — PAIN SCALES - GENERAL
PAINLEVEL_OUTOF10: 0
PAINLEVEL_OUTOF10: 0
PAINLEVEL_OUTOF10: 5
PAINLEVEL_OUTOF10: 0

## 2020-11-17 NOTE — ED PROVIDER NOTES
**ADVANCED PRACTICE PROVIDER, I HAVE EVALUATED THIS PATIENT**        [facility]  EMERGENCY DEPARTMENT ENCOUNTER      Pt Name: Moses Perez  FTS:[id number]  [place] 1940  Date of evaluation: 11/17/2020  Provider: Nitin Rao PA-C      Chief Complaint:    Chief Complaint   Patient presents with    Shortness of Breath     acute on chronic. increasing since yesterday. denies cough. denies cough. denies chest pain       Nursing Notes, Past Medical Hx, Past Surgical Hx, Social Hx, Allergies, and Family Hx were all reviewed and agreed with or any disagreements were addressed in the HPI.    HPI:  (Location, Duration, Timing, Severity, Quality, Assoc Sx, Context, Modifying factors)  This is a  79 y.o. male who complain of headache, bilateral ear fullness and sore throat for the last 3 days and started with increased shortness of breath on yesterday early this morning. He was seen by his primary care physician today who referred him to the emergency room. Says that he would need a Covid test as well as some blood work. Patient denies chest pain, no abdominal pain, no fever. No extremity weakness. He is ambulatory. Says his head ache is more towards the front. Denies any visual changes. No slurred speech. Says he has a history of COPD and shortness of breath is slightly different than his regular. Also has a history of CHF. No other complaints.       PastMedical/Surgical History:      Diagnosis Date    AICD (automatic implantable cardioverter defibrillator) at end of life     pt instructed to bring ID card--hx of LBBB     Anxiety and depression     Arthritis     Back pain     CHF (congestive heart failure) (HCC)     GERD (gastroesophageal reflux disease)     History of blood transfusion 2005    Hyperlipidemia     Hypertension     Hypothyroidism     hypothyroidism    S/P cardiac cath 6-2009    Nl LVEF and NL cors/ chronic LBBB now paced    Sleep apnea     pt does not use Cpap machine    Wears glasses          Procedure Laterality Date    COLONOSCOPY  05/11/2016    hua'celina--elizabeth 2021     ELBOW SURGERY Bilateral     Tennis elbow    HAMMER TOE SURGERY      x2    JOINT REPLACEMENT Left     Left Knee    KNEE ARTHROSCOPY      Rt and Left(2)    KNEE CARTILAGE SURGERY  8-2008    Repair Left    LUMBAR LAMINECTOMY  9-2007(L2-3), (L4-5), 2015    screws    TURP         Medications:  Current Discharge Medication List      CONTINUE these medications which have NOT CHANGED    Details   citalopram (CELEXA) 40 MG tablet TAKE 1 TABLET BY MOUTH EVERY MORNING  Qty: 90 tablet, Refills: 0      atorvastatin (LIPITOR) 40 MG tablet TAKE 1 TABLET BY MOUTH AT BEDTIME  Qty: 90 tablet, Refills: 0      budesonide-formoterol (SYMBICORT) 160-4.5 MCG/ACT AERO Inhale 2 puffs into the lungs 2 times daily      tiotropium (SPIRIVA RESPIMAT) 2.5 MCG/ACT AERS inhaler Inhale 2 puffs into the lungs daily      albuterol sulfate  (90 Base) MCG/ACT inhaler Inhale 2 puffs into the lungs every 6 hours as needed      levothyroxine (SYNTHROID) 50 MCG tablet TAKE 1 TABLET BY MOUTH DAILY  Qty: 90 tablet, Refills: 3      carvedilol (COREG) 12.5 MG tablet TAKE 1 TABLET BY MOUTH TWICE DAILY WITH MEALS  Qty: 180 tablet, Refills: 3      amitriptyline (ELAVIL) 25 MG tablet TAKE 1 TABLET BY MOUTH EVERY NIGHT AT BEDTIME  Qty: 90 tablet, Refills: 5      aspirin 325 MG tablet Take 325 mg by mouth daily. Review of Systems:  Review of Systems   Constitutional: Negative for chills and fever. HENT: Positive for sore throat. Negative for congestion, ear discharge, ear pain, facial swelling and sneezing. Eyes: Negative for discharge and redness. Respiratory: Positive for shortness of breath. Negative for apnea and choking. Cardiovascular: Negative for chest pain. Gastrointestinal: Negative for abdominal pain, nausea and vomiting. Genitourinary: Negative for dysuria.    Musculoskeletal: Negative for back pain, neck pain and neck stiffness. Neurological: Positive for headaches. Negative for dizziness, tremors and seizures. All other systems reviewed and are negative. Positives and Pertinent negatives as per HPI. Except as noted above in the ROS, problem specific ROS was completed and is negative. Physical Exam:  Physical Exam  Vitals signs and nursing note reviewed. Constitutional:       Appearance: He is well-developed. He is not diaphoretic. HENT:      Head: Normocephalic and atraumatic. Nose: Nose normal.      Mouth/Throat:      Mouth: Mucous membranes are moist.      Pharynx: Oropharynx is clear. Eyes:      General: No scleral icterus. Right eye: No discharge. Left eye: No discharge. Conjunctiva/sclera: Conjunctivae normal.      Pupils: Pupils are equal, round, and reactive to light. Neck:      Musculoskeletal: Normal range of motion and neck supple. Comments: No nuchal rigidity  Cardiovascular:      Rate and Rhythm: Normal rate and regular rhythm. Heart sounds: Normal heart sounds. No murmur. No friction rub. No gallop. Pulmonary:      Effort: Pulmonary effort is normal. No respiratory distress. Breath sounds: Normal breath sounds. No wheezing or rales. Chest:      Chest wall: No tenderness. Abdominal:      General: Abdomen is flat. Bowel sounds are normal. There is no distension. Palpations: Abdomen is soft. There is no mass. Tenderness: There is no abdominal tenderness. There is no guarding or rebound. Musculoskeletal: Normal range of motion. Skin:     General: Skin is warm and dry. Neurological:      Mental Status: He is alert and oriented to person, place, and time. He is not disoriented. GCS: GCS eye subscore is 4. GCS verbal subscore is 5. GCS motor subscore is 6. Cranial Nerves: No cranial nerve deficit. Sensory: No sensory deficit.       Motor: No tremor, abnormal muscle tone or seizure activity.       Coordination: Coordination normal.      Comments: Finger to nose normal   Psychiatric:         Behavior: Behavior normal.         MEDICAL DECISION MAKING    Vitals:    Vitals:    11/17/20 2345 11/18/20 0015 11/18/20 0100 11/18/20 0130   BP: 138/62 (!) 156/73 (!) 157/83    Pulse: 63 62 65    Resp: 12 11 16    Temp:   98.3 °F (36.8 °C)    TempSrc:   Oral    SpO2: 94% 96% 94% 98%   Weight:   211 lb 3.2 oz (95.8 kg)    Height:   5' 10\" (1.778 m)        LABS:  Labs Reviewed   D-DIMER, QUANTITATIVE - Abnormal; Notable for the following components:       Result Value    D-Dimer, Quant 287 (*)     All other components within normal limits    Narrative:     Performed at:  [facility]  [address] [place] [address]   Phone [phone]   STREP SCREEN GROUP A THROAT    Narrative:     Performed at:  [facility]  [address] [place] [address]   Phone [phone]   CULTURE, BETA STREP CONFIRM PLATES   CBC WITH AUTO DIFFERENTIAL    Narrative:     Performed at:  [facility]  [address] [place] [address]   Phone [phone]   COMPREHENSIVE METABOLIC PANEL    Narrative:     Performed at:  [facility]  [address] [facility] [place] [address]   Phone [phone]   BRAIN NATRIURETIC PEPTIDE    Narrative:     Performed at:  [address] [place] [address]  [address] [facility] [place] [address]   Phone [phone]   TROPONIN    Narrative:     Performed at:  [facility]  [address] [facility] [place] [address]   Phone [phone]    Narrative:     Performed at:  [facility]  [address] [facility] [place] [address]   Phone [phone]    Narrative:     Performed at:  [address] [place] [address]  [address] [facility] [address] [facility] [place] [address]   Phone [phone]   URINE RT REFLEX TO CULTURE   BASIC METABOLIC PANEL W/ REFLEX TO MG FOR LOW K   CBC        Remainder of labs reviewed and werenegative at this time or not returned at the time of this note. RADIOLOGY:   Non-plain film images such as CT, Ultrasound and MRI are read by the radiologist. Kanu Green PA-C have directly visualized the radiologic plain film image(s) with the below findings:        Interpretation per the Radiologist below, if available at the time of this note:    XR CHEST PORTABLE   Final Result   No acute cardiopulmonary abnormality. No results found.        MEDICAL DECISION MAKING / ED COURSE:      PROCEDURES:   Procedures    None    Patient was given:  Medications   sodium chloride flush 0.9 % injection 10 mL (10 mLs Intravenous Given 11/18/20 0126)   sodium chloride flush 0.9 % injection 10 mL (has no administration in time range)   acetaminophen (TYLENOL) tablet 650 mg (650 mg Oral Given 11/18/20 0132)     Or   acetaminophen (TYLENOL) suppository 650 mg ( Rectal See Alternative 11/18/20 0132)   polyethylene glycol (GLYCOLAX) packet 17 g (has no administration in time range)   promethazine (PHENERGAN) tablet 12.5 mg (has no administration in time range)     Or   ondansetron (ZOFRAN) injection 4 mg (has no administration in time range)   enoxaparin (LOVENOX) injection 40 mg (has no administration in time range)   albuterol (PROVENTIL) nebulizer solution 2.5 mg (has no administration in time range)   amitriptyline (ELAVIL) tablet 25 mg (25 mg Oral Given 11/18/20 0125)   aspirin tablet 325 mg (has no administration in time range)   atorvastatin (LIPITOR) tablet 40 mg (40 mg Oral Given 11/18/20 0125)   budesonide-formoterol (SYMBICORT) 160-4.5 MCG/ACT inhaler 2 puff (2 puffs Inhalation Given 11/17/20 2204)   carvedilol (COREG) tablet 12.5 mg (has no administration in time range)   citalopram (CELEXA) tablet 40 mg (has no administration in time range)   levothyroxine (SYNTHROID) tablet 50 mcg (has no administration in time range)   spironolactone (ALDACTONE) tablet 25 mg (has no administration in time range)   tiotropium (SPIRIVA RESPIMAT) 2.5 MCG/ACT inhaler 2 puff (has no administration in time range)   acetaminophen (TYLENOL) tablet 1,000 mg (1,000 mg Oral Given 11/17/20 1816)   HYDROcodone-acetaminophen (NORCO) 5-325 MG per tablet 1 tablet (1 tablet Oral Given 11/17/20 2038)   ipratropium-albuterol (DUONEB) nebulizer solution 1 ampule (1 ampule Inhalation Given 11/17/20 2204)   predniSONE (DELTASONE) tablet 60 mg (60 mg Oral Given 11/18/20 0042)     Emergency room course: Patient on exam pupils equal round and reactive to light extraocular movement is intact. No nystagmus noted. Patient has no frontal or maxillary sinus tenderness to palpation. Does feel slight fullness when he leaned forward he feels in his face. Neck is supple full range of motion without tenderness. No nuchal rigidity. Throat is clear. Nonerythematous no exudate. Cardiovascular regular rhythm, lungs are clear. No wheeze, rales or rhonchi noted. Chest wall show no tenderness. Abdomen is soft nontender. Bilateral lower extremities show no edema. Patient has good strength against resisted plantar dorsiflexion. No facial drooping, no slurred speech noted. Bilateral TMs are visible. The right ear has slight cerumen impaction but not occluded. Left ear is clear. Patient is ambulatory with no difficulty. Alert oriented x4. Does not appear to be in acute distress. Lab result from today shows:  CBC within normal limits with a white count of 6.8. CMP shows sodium 141, potassium 4.1, chloride 106 with a BUN of 18 and creatinine 1.1    BNP 62    Troponin less than 0.01    Rapid strep is negative for group A strep. Chest x-ray shows no acute cardiopulmonary abnormalities. Discussed patient lab results from today with him. His Covid test is pending.   He is to

## 2020-11-17 NOTE — ED NOTES
Pt to ED with c/o sob, increasing since yesterday. Hx COPD. Pt also reports headache and sore throat that started 4 days ago. Denies cough, denies any chest pain.       Iva Rodriguez RN  11/17/20 0222

## 2020-11-17 NOTE — ED NOTES
Pt right ear irrigated at this time. Moderate amount of wax removal. Resp even and unlabored. A/ox4. No acute distress noted. Denies any need at this time. Call light within reach. Bed in lowest position. Will continue to monitor.         Maude Gordon RN  11/17/20 5441

## 2020-11-18 ENCOUNTER — APPOINTMENT (OUTPATIENT)
Dept: CT IMAGING | Age: 80
DRG: 206 | End: 2020-11-18
Payer: MEDICARE

## 2020-11-18 LAB
ANION GAP SERPL CALCULATED.3IONS-SCNC: 12 MMOL/L (ref 3–16)
BILIRUBIN URINE: NEGATIVE
BLOOD, URINE: NEGATIVE
BUN BLDV-MCNC: 20 MG/DL (ref 7–20)
CALCIUM SERPL-MCNC: 8.8 MG/DL (ref 8.3–10.6)
CHLORIDE BLD-SCNC: 104 MMOL/L (ref 99–110)
CLARITY: CLEAR
CO2: 25 MMOL/L (ref 21–32)
COLOR: YELLOW
CREAT SERPL-MCNC: 1.2 MG/DL (ref 0.8–1.3)
EKG ATRIAL RATE: 66 BPM
EKG DIAGNOSIS: NORMAL
EKG P-R INTERVAL: 134 MS
EKG Q-T INTERVAL: 410 MS
EKG QRS DURATION: 102 MS
EKG QTC CALCULATION (BAZETT): 429 MS
EKG R AXIS: -69 DEGREES
EKG T AXIS: 54 DEGREES
EKG VENTRICULAR RATE: 66 BPM
GFR AFRICAN AMERICAN: >60
GFR NON-AFRICAN AMERICAN: 58
GLUCOSE BLD-MCNC: 160 MG/DL (ref 70–99)
GLUCOSE URINE: NEGATIVE MG/DL
HCT VFR BLD CALC: 43.3 % (ref 40.5–52.5)
HEMOGLOBIN: 14.5 G/DL (ref 13.5–17.5)
KETONES, URINE: NEGATIVE MG/DL
LEUKOCYTE ESTERASE, URINE: NEGATIVE
MCH RBC QN AUTO: 30.6 PG (ref 26–34)
MCHC RBC AUTO-ENTMCNC: 33.5 G/DL (ref 31–36)
MCV RBC AUTO: 91.5 FL (ref 80–100)
MICROSCOPIC EXAMINATION: NORMAL
NITRITE, URINE: NEGATIVE
PDW BLD-RTO: 14 % (ref 12.4–15.4)
PH UA: 6 (ref 5–8)
PLATELET # BLD: 193 K/UL (ref 135–450)
PMV BLD AUTO: 7.7 FL (ref 5–10.5)
POTASSIUM REFLEX MAGNESIUM: 4.1 MMOL/L (ref 3.5–5.1)
PROTEIN UA: NEGATIVE MG/DL
RBC # BLD: 4.73 M/UL (ref 4.2–5.9)
SARS-COV-2, PCR: NOT DETECTED
SODIUM BLD-SCNC: 141 MMOL/L (ref 136–145)
SPECIFIC GRAVITY UA: >1.03 (ref 1–1.03)
URINE REFLEX TO CULTURE: NORMAL
URINE TYPE: NORMAL
UROBILINOGEN, URINE: 1 E.U./DL
WBC # BLD: 7.4 K/UL (ref 4–11)

## 2020-11-18 PROCEDURE — G0378 HOSPITAL OBSERVATION PER HR: HCPCS

## 2020-11-18 PROCEDURE — 94760 N-INVAS EAR/PLS OXIMETRY 1: CPT

## 2020-11-18 PROCEDURE — 85027 COMPLETE CBC AUTOMATED: CPT

## 2020-11-18 PROCEDURE — 6370000000 HC RX 637 (ALT 250 FOR IP): Performed by: INTERNAL MEDICINE

## 2020-11-18 PROCEDURE — 2580000003 HC RX 258: Performed by: INTERNAL MEDICINE

## 2020-11-18 PROCEDURE — 94640 AIRWAY INHALATION TREATMENT: CPT

## 2020-11-18 PROCEDURE — 6360000002 HC RX W HCPCS: Performed by: INTERNAL MEDICINE

## 2020-11-18 PROCEDURE — 96372 THER/PROPH/DIAG INJ SC/IM: CPT

## 2020-11-18 PROCEDURE — 6370000000 HC RX 637 (ALT 250 FOR IP): Performed by: PHYSICIAN ASSISTANT

## 2020-11-18 PROCEDURE — 81003 URINALYSIS AUTO W/O SCOPE: CPT

## 2020-11-18 PROCEDURE — 80048 BASIC METABOLIC PNL TOTAL CA: CPT

## 2020-11-18 PROCEDURE — 99223 1ST HOSP IP/OBS HIGH 75: CPT | Performed by: INTERNAL MEDICINE

## 2020-11-18 PROCEDURE — 6360000004 HC RX CONTRAST MEDICATION: Performed by: INTERNAL MEDICINE

## 2020-11-18 PROCEDURE — 71260 CT THORAX DX C+: CPT

## 2020-11-18 PROCEDURE — 36415 COLL VENOUS BLD VENIPUNCTURE: CPT

## 2020-11-18 PROCEDURE — 93010 ELECTROCARDIOGRAM REPORT: CPT | Performed by: INTERNAL MEDICINE

## 2020-11-18 PROCEDURE — 1200000000 HC SEMI PRIVATE

## 2020-11-18 RX ORDER — LANOLIN ALCOHOL/MO/W.PET/CERES
10 CREAM (GRAM) TOPICAL NIGHTLY
Status: DISCONTINUED | OUTPATIENT
Start: 2020-11-18 | End: 2020-11-20 | Stop reason: HOSPADM

## 2020-11-18 RX ORDER — IPRATROPIUM BROMIDE AND ALBUTEROL SULFATE 2.5; .5 MG/3ML; MG/3ML
1 SOLUTION RESPIRATORY (INHALATION)
Status: DISCONTINUED | OUTPATIENT
Start: 2020-11-18 | End: 2020-11-20 | Stop reason: HOSPADM

## 2020-11-18 RX ORDER — PREDNISONE 20 MG/1
40 TABLET ORAL DAILY
Status: DISCONTINUED | OUTPATIENT
Start: 2020-11-18 | End: 2020-11-19

## 2020-11-18 RX ADMIN — BUDESONIDE AND FORMOTEROL FUMARATE DIHYDRATE 2 PUFF: 160; 4.5 AEROSOL RESPIRATORY (INHALATION) at 21:26

## 2020-11-18 RX ADMIN — SODIUM CHLORIDE, PRESERVATIVE FREE 10 ML: 5 INJECTION INTRAVENOUS at 01:26

## 2020-11-18 RX ADMIN — Medication 10.5 MG: at 21:20

## 2020-11-18 RX ADMIN — BUDESONIDE AND FORMOTEROL FUMARATE DIHYDRATE 2 PUFF: 160; 4.5 AEROSOL RESPIRATORY (INHALATION) at 08:30

## 2020-11-18 RX ADMIN — CARVEDILOL 12.5 MG: 6.25 TABLET, FILM COATED ORAL at 16:56

## 2020-11-18 RX ADMIN — IPRATROPIUM BROMIDE AND ALBUTEROL SULFATE 1 AMPULE: .5; 3 SOLUTION RESPIRATORY (INHALATION) at 16:29

## 2020-11-18 RX ADMIN — PREDNISONE 40 MG: 20 TABLET ORAL at 08:51

## 2020-11-18 RX ADMIN — SODIUM CHLORIDE, PRESERVATIVE FREE 10 ML: 5 INJECTION INTRAVENOUS at 20:00

## 2020-11-18 RX ADMIN — CARVEDILOL 12.5 MG: 6.25 TABLET, FILM COATED ORAL at 08:52

## 2020-11-18 RX ADMIN — SPIRONOLACTONE 25 MG: 25 TABLET ORAL at 08:51

## 2020-11-18 RX ADMIN — ASPIRIN 325 MG ORAL TABLET 325 MG: 325 PILL ORAL at 08:51

## 2020-11-18 RX ADMIN — IPRATROPIUM BROMIDE AND ALBUTEROL SULFATE 1 AMPULE: .5; 3 SOLUTION RESPIRATORY (INHALATION) at 21:26

## 2020-11-18 RX ADMIN — SODIUM CHLORIDE, PRESERVATIVE FREE 10 ML: 5 INJECTION INTRAVENOUS at 08:52

## 2020-11-18 RX ADMIN — IPRATROPIUM BROMIDE AND ALBUTEROL SULFATE 1 AMPULE: .5; 3 SOLUTION RESPIRATORY (INHALATION) at 11:46

## 2020-11-18 RX ADMIN — ENOXAPARIN SODIUM 40 MG: 40 INJECTION SUBCUTANEOUS at 08:51

## 2020-11-18 RX ADMIN — CITALOPRAM HYDROBROMIDE 40 MG: 10 TABLET ORAL at 08:51

## 2020-11-18 RX ADMIN — ATORVASTATIN CALCIUM 40 MG: 40 TABLET, FILM COATED ORAL at 01:25

## 2020-11-18 RX ADMIN — ACETAMINOPHEN 650 MG: 325 TABLET ORAL at 13:36

## 2020-11-18 RX ADMIN — AMITRIPTYLINE HYDROCHLORIDE 25 MG: 25 TABLET, FILM COATED ORAL at 01:25

## 2020-11-18 RX ADMIN — AMITRIPTYLINE HYDROCHLORIDE 25 MG: 25 TABLET, FILM COATED ORAL at 20:00

## 2020-11-18 RX ADMIN — ACETAMINOPHEN 650 MG: 325 TABLET ORAL at 19:59

## 2020-11-18 RX ADMIN — ATORVASTATIN CALCIUM 40 MG: 40 TABLET, FILM COATED ORAL at 20:00

## 2020-11-18 RX ADMIN — LEVOTHYROXINE SODIUM 50 MCG: 0.05 TABLET ORAL at 08:51

## 2020-11-18 RX ADMIN — ACETAMINOPHEN 650 MG: 325 TABLET ORAL at 01:32

## 2020-11-18 RX ADMIN — IOPAMIDOL 75 ML: 755 INJECTION, SOLUTION INTRAVENOUS at 10:18

## 2020-11-18 RX ADMIN — PREDNISONE 60 MG: 20 TABLET ORAL at 00:42

## 2020-11-18 ASSESSMENT — PAIN DESCRIPTION - PROGRESSION
CLINICAL_PROGRESSION: NOT CHANGED
CLINICAL_PROGRESSION: NOT CHANGED

## 2020-11-18 ASSESSMENT — PAIN DESCRIPTION - FREQUENCY
FREQUENCY: CONTINUOUS
FREQUENCY: CONTINUOUS

## 2020-11-18 ASSESSMENT — PAIN SCALES - GENERAL
PAINLEVEL_OUTOF10: 0
PAINLEVEL_OUTOF10: 0
PAINLEVEL_OUTOF10: 3
PAINLEVEL_OUTOF10: 2
PAINLEVEL_OUTOF10: 0
PAINLEVEL_OUTOF10: 3

## 2020-11-18 ASSESSMENT — PAIN DESCRIPTION - DESCRIPTORS
DESCRIPTORS: ACHING
DESCRIPTORS: HEADACHE
DESCRIPTORS: HEADACHE

## 2020-11-18 ASSESSMENT — PAIN SCALES - WONG BAKER: WONGBAKER_NUMERICALRESPONSE: 0

## 2020-11-18 ASSESSMENT — PAIN - FUNCTIONAL ASSESSMENT
PAIN_FUNCTIONAL_ASSESSMENT: ACTIVITIES ARE NOT PREVENTED
PAIN_FUNCTIONAL_ASSESSMENT: ACTIVITIES ARE NOT PREVENTED

## 2020-11-18 ASSESSMENT — PAIN DESCRIPTION - PAIN TYPE
TYPE: ACUTE PAIN
TYPE: ACUTE PAIN

## 2020-11-18 ASSESSMENT — PAIN DESCRIPTION - LOCATION
LOCATION: HEAD
LOCATION: HEAD

## 2020-11-18 ASSESSMENT — PAIN DESCRIPTION - ONSET
ONSET: ON-GOING
ONSET: ON-GOING

## 2020-11-18 ASSESSMENT — PAIN DESCRIPTION - ORIENTATION
ORIENTATION: MID
ORIENTATION: MID

## 2020-11-18 NOTE — CARE COORDINATION
INITIAL CASE MANAGEMENT ASSESSMENT    Reviewed chart, met with patient to assess possible discharge needs. Explained Case Management role/services. Living Situation: lives alone at home    ADLs: independent     DME: has O2 converter he uses at  through [employer]    PT/OT Recs: independent     Active Services: none     Transportation: active  - family can transport home at [employer]     Medications: no issues--uses [facility] [place]    PCP: Dr Saini Apo       PLAN/COMMENTS: patient plans to return home at dc--denies dc needs    SW/CM provided contact information for patient or family to call with any questions. SW/CM will follow and assist as needed.   Electronically signed by Trevor Bean on 11/18/2020 at 2:12 PM

## 2020-11-18 NOTE — PROGRESS NOTES
4 Eyes Skin Assessment     NAME:  Moses Perez  YOB: 1940  MEDICAL RECORD NUMBER:  [id number]    The patient is being assess for  Admission    I agree that 2 RN's have performed a thorough Head to Toe Skin Assessment on the patient. ALL assessment sites listed below have been assessed. Areas assessed by both nurses:    Head, Face, Ears, Shoulders, Back, Chest, Arms, Elbows, Hands, Sacrum. Buttock, Coccyx, Ischium and Legs. Feet and Heels        Does the Patient have a Wound?  No noted wound(s)       Earl Prevention initiated:  Yes   Wound Care Orders initiated:  No    Pressure Injury (Stage 3,4, Unstageable, DTI, NWPT, and Complex wounds) if present place consult order under [de-identified] No    New and Established Ostomies if present place consult order under : No      Nurse 1 eSignature: Electronically signed by Viridiana Delacruz RN on 11/18/20 at 1:49 AM EST    **SHARE this note so that the co-signing nurse is able to place an eSignature**    Nurse 2 eSignature: Electronically signed by Neetu Bhat RN on 11/18/20 at 4:02 AM EST

## 2020-11-18 NOTE — H&P
0 [address] [address]                              HISTORY AND PHYSICAL    PATIENT NAME: Pollo Yu                 :        1940  MED REC NO:   [id number]                          ROOM:       3105  ACCOUNT NO:   [de-identified]                           ADMIT DATE: 2020  PROVIDER:     Titus Field MD    CHIEF COMPLAINT:  Dyspnea on exertion. HISTORY OF PRESENT ILLNESS:  The patient is a 61-year-old white male  admitted through emergency. He presented with a history of increasing  shortness of breath with exertion, associated tachycardia. He said his  symptoms had been about 24 to 48 hours' duration. He initially was  evaluated in our emergency room and was noted to be increasingly short  of breath and a small walk from the parking lot into our office gave him  tachycardia and hypoxemia. He was sent over to ER. Similar findings in  the ER where his O2 sats went below 90% with just minimal walking in the  emergency and he became tachycardic. PAST MEDICAL HISTORY:  Remarkable for COPD. He also has a history of  systolic congestive failure that has improved, followed by Dr. Lamont Galvan at the Cardiology service and by Pulmonary docs at [address] [place] [address]. He is being admitted for increased hypoxemia, shortness of  breath. His past medical history is remarkable for placement of AICD by  Dr. Lamont Galvan through [address] [place] [address] Cardiology. He also has history of  hyperlipidemia, hypertension, hypothyroidism, and sleep apnea. PAST SURGICAL HISTORY:  Includes lumbar laminectomy, left knee joint  replacement, and TURP. ALLERGIES:  PIROXICAM and PENICILLINS. ADMISSION MEDICATIONS:  Include citalopram, atorvastatin, Symbicort  160/4.5, Spiriva, albuterol inhaler, levothyroxine, carvedilol,  amitriptyline, and aspirin. FAMILY HISTORY:  Noncontributory.     REVIEW OF SYSTEMS:  Positive for throat congestion. Positive for  shortness of breath. Positive for weakness with exertion. It is  negative for chest pain. Negative for dysuria. Negative for fever. Ten-point review of systems performed was negative. PHYSICAL EXAMINATION:  VITAL SIGNS:  Blood pressure was 173/79, pulse 86, respiration 18, he  was afebrile, O2 sat was 91% on room air and dropped to 89% with  ambulation. HEENT:  He has got a fair amount of postnasal drip and coughing when  visited by Dr. Sarah Winters in the morning while eating breakfast.  Head is  normocephalic, atraumatic. Pupils are equal, round, reactive to light  and accommodation. Extraocular muscles intact. NECK:  Supple without JVD. CHEST:  Rhonchi over the anterior chest without evidence of wheezes. CARDIOVASCULAR:  Regular rate and rhythm. A 1/6 systolic murmur at the  left lower sternal border. ABDOMEN:  Obese, soft, nontender without mass or organomegaly. EXTREMITIES:  Showed 1+ pitting edema. BACK:  There was decreased range of motion over the lumbar spine. NEUROLOGIC:  He is alert, oriented, conversant. Moving all extremities  without focal neurologic deficits. LABORATORY DATA:  BUN and creatinine 18 and 1.1, sodium 141, potassium  4.1, blood sugar was 91. BNP was 62. Troponin is less than 0.01. White count 6800, hemoglobin 14.7, platelet count 815,797. Rapid strep  was negative. D-dimer was mildly elevated at 287. COVID test, rapid  and long-acting tests were both run and were negative. Urinalysis was  unremarkable. EKG showed a paced rhythm. Chest x-ray shows no acute cardiopulmonary abnormality. IMPRESSION:  1. Dyspnea on exertion, progressive; hypoxemia, tachycardia, and  weakness. 2.  Chronic systolic congestive heart failure. 3.  AICD. 4.  Hyperlipidemia. 5.  COPD. PLAN:  We will admit him. We will give him handheld nebulizer, oxygen. We will get a Pulmonary consult. Monitor him.   Get a CTA of the chest  since he had a slightly elevated D-dimer. Symptoms are only minimally  increased from his baseline but he definitely feels significant  shortness of breath with activity which is a new finding for him.         Chandana Finnegan MD    D: 11/18/2020 8:31:31       T: 11/18/2020 10:20:37     [email or web address]  Job#: [id number]     Doc#: [id number]    CC:  Uvaldo Short MD

## 2020-11-18 NOTE — ED NOTES
Spoke to patient's daughter at patient's request and let her know patient was being admitted.      Manuel Castro RN  11/17/20 5277

## 2020-11-18 NOTE — PROGRESS NOTES
Medication Reconciliation    List of medications patient is currently taking is complete. Source of information: 1. Conversation with patient at bedside                                      2. EPIC records      Notes regarding home medications:   1. Patient received all of his MORNING home medications prior to arrival to the emergency department. 2. Patient reports NO LONGER taking spironolactone 25 mg and can confirm that patient last picked up medication in February of 2020. 3. Patient reports using Spiriva inhaler differently than prescribed where he takes 1 puff BID instead of 2 puffs QD.  4. Patient reports taking all maintenance medication NIGHTLY accept for his morning Coreg and full-dose 325 mg aspirin. 5. Patient denies any other OTC or herbal medication use.     Sidney Ji, Pharmacy Intern  11/17/2020 10:02 PM

## 2020-11-18 NOTE — PLAN OF CARE
Problem: OXYGENATION/RESPIRATORY FUNCTION  Goal: Patient will maintain patent airway  11/18/2020 1140 by Olga Rendon RN  Outcome: Ongoing  Note: Patient has maintained a patent airway, repositions self, lung sounds bilaterally diminished, cough noted and is occasional, RT is involved in patient care and is providing care, patient has had adequate fluid intake and is not on a fluid restriction, no need to suction airway at this time, educated patient to turn and cough and deep breathe every two hours and as needed, encouraged incentive spirometer and patient has been performing. Will continue to monitor and reassess. Electronically signed by Gayathri Tam RN on 11/18/2020 at 11:41 AM    11/18/2020 0147 by Maritza Tabor RN  Outcome: Ongoing  Note: Patients airway will remain patent    Goal: Patient will achieve/maintain normal respiratory rate/effort  Description: Respiratory rate and effort will be within normal limits for the patient  11/18/2020 1140 by Gayathri Tam RN  Outcome: Ongoing  11/18/2020 0147 by Maritza Tabor RN  Outcome: Ongoing  Note: Patients respiratory status will return to baseline       Problem: HEMODYNAMIC STATUS  Goal: Patient has stable vital signs and fluid balance  11/18/2020 1140 by Claudette Rendon RN  Outcome: Ongoing  Note: Pt VSS and WDL. Pt is a daily weight, strict intake and output being monitored, pt extremity neuro checks WDL. Pt is encouraged to deep breathe and cough. Pt encouraged to monitor fluid intake to prevent fluid overload. Will continue to monitor and assess.    Electronically signed by Gayathri Tam RN on 11/18/2020 at 11:41 AM    11/18/2020 0147 by Maritza Tabor RN  Outcome: Ongoing  Note: Vitals and fluid balance will remain stable       Problem: FLUID AND ELECTROLYTE IMBALANCE  Goal: Fluid and electrolyte balance are achieved/maintained  11/18/2020 1140 by Gayathri Tam RN  Outcome: Ongoing  11/18/2020 0147 by Maritza Tabor RN  Outcome: Ongoing     Problem: ACTIVITY INTOLERANCE/IMPAIRED MOBILITY  Goal: Mobility/activity is maintained at optimum level for patient  11/18/2020 1140 by Lindsey Wyatt RN  Outcome: Ongoing  11/18/2020 0147 by Katy Banegas RN  Outcome: Ongoing  Note: Patients activity will return to baseline       Problem: Falls - Risk of:  Goal: Will remain free from falls  Description: Will remain free from falls  11/18/2020 1140 by Lindsey Wyatt RN  Outcome: Ongoing  Note: Fall risk assessment completed. Fall precautions in place. Call light within reach. Pt educated on calling for assistance before getting up. Walkway free of clutter. Will continue to monitor. Electronically signed by Lindsey Wyatt RN on 11/18/2020 at 11:41 AM    11/18/2020 0147 by Katy Banegas RN  Outcome: Ongoing  Note: Fall risk assessment completed. Fall precautions in place. Call light within reach. Pt educated on calling for assistance before getting up. Walkway free of clutter. Will continue to monitor. Goal: Absence of physical injury  Description: Absence of physical injury  11/18/2020 1140 by Lindsey Wyatt RN  Outcome: Ongoing  11/18/2020 0147 by Katy Banegas RN  Outcome: Ongoing  Note: Pt is free of injury. No injury noted. Fall precautions in place. Call light within reach. Will monitor.

## 2020-11-18 NOTE — CONSULTS
tobacco.    Scheduled Meds:   predniSONE  40 mg Oral Daily    ipratropium-albuterol  1 ampule Inhalation Q4H WA    sodium chloride flush  10 mL Intravenous 2 times per day    enoxaparin  40 mg Subcutaneous Daily    amitriptyline  25 mg Oral Nightly    aspirin  325 mg Oral Daily    atorvastatin  40 mg Oral Nightly    budesonide-formoterol  2 puff Inhalation BID    carvedilol  12.5 mg Oral BID WC    citalopram  40 mg Oral Daily    levothyroxine  50 mcg Oral Daily    spironolactone  25 mg Oral Daily       Continuous Infusions:      PRN Meds:  sodium chloride flush, acetaminophen **OR** acetaminophen, polyethylene glycol, promethazine **OR** ondansetron, albuterol    ALLERGIES:  Patient is allergic to piroxicam and penicillins. REVIEW OF SYSTEMS:  Constitutional: Negative for fever or chills  HENT: Negative for sore throat  Eyes: Negative for redness   Respiratory: Acute onset of SOB with exertion, no cough etc.   Cardiovascular: Negative for chest pain  Gastrointestinal: Negative for vomiting, diarrhea   Genitourinary: Negative for hematuria, negative for dysuria  Musculoskeletal: Negative for arthralgias   Skin: Negative for rash  Neurological: Negative for syncope  Hematological: Negative for adenopathy  Extremities:  Negative for swelling    PHYSICAL EXAM:  Blood pressure (!) 143/81, pulse 76, temperature 98.8 °F (37.1 °C), temperature source Oral, resp. rate 18, height 5' 10\" (1.778 m), weight 211 lb 3.2 oz (95.8 kg), SpO2 94 %.'  Gen: No distress. Eyes: PERRL. No sclera icterus. No conjunctival injection. ENT: No discharge. Pharynx clear. Neck: Trachea midline. No obvious mass. Resp: Clear but diminished   CV: Regular rate. Regular rhythm. No murmur or rub. GI: Non-tender. Non-distended. No hernia. BS present. Skin: Warm and dry. No nodule on exposed extremities. Lymph: No cervical LAD. No supraclavicular LAD. M/S: No cyanosis. No joint deformity. Neuro: Awake. Alert.  Moves all four extremities. EXT:   no edema, no clubbing    LABS:  CBC:   Recent Labs     11/17/20 1807 11/18/20 0439   WBC 6.8 7.4   HGB 14.7 14.5   HCT 44.8 43.3   MCV 92.6 91.5    193     BMP:   Recent Labs     11/17/20 1807 11/18/20 0439    141   K 4.1 4.1    104   CO2 23 25   BUN 18 20   CREATININE 1.1 1.2     LIVER PROFILE:   Recent Labs     11/17/20 1807   AST 27   ALT 28   BILITOT 0.9   ALKPHOS 89     PT/INR: No results for input(s): PROTIME, INR in the last 72 hours. APTT: No results for input(s): APTT in the last 72 hours. UA:  Recent Labs     11/18/20 0423   COLORU YELLOW   PHUR 6.0   CLARITYU Clear   SPECGRAV >1.030   LEUKOCYTESUR Negative   UROBILINOGEN 1.0   BILIRUBINUR Negative   BLOODU Negative   GLUCOSEU Negative     No results for input(s): PHART, QCM1YEN, PO2ART in the last 72 hours. Cultures:   Pending    PFTs:  No access     ECHO:  The pulmonary artery is not well visualized. There is a pacemaker lead in the right ventricle. Overall left ventricular ejection fraction is estimated to be 50-55%. The left ventricular function is low normal.  There is an abnormal contraction sequence due to paced rhythm. There is trace mitral regurgitation. Trace aortic regurgitation. There is no tricuspid valve vegetation. ABG:  None    Chest X-ray:  Chest imaging was reviewed by me and showed clear lungs       I reviewed all the above labs and studies pertaining to this visit. ASSESSMENT:  · Acute Dyspnea, unclear. Lung clear on exam.  Less likely lungs as cause. Panic? · COPD per history. Says chronic bronchitis but he does not have productive cough at this time   · Nocturnal Hypoxia with 2 liters. Probably has MARILYNN    PLAN:  · Supply oxygen with sleep  · Continue with bronchodilators as is  · CTA is pending  · DVT prophylaxis with Lovenox    Not on oxygen at this time. Clear lungs. Doubt pulmonary issue. Await CTA. Recs based on that.           Farida Quiñonez, DO [facility]

## 2020-11-18 NOTE — ED NOTES
ED SBAR report provider to [facility]. Patient to be transported to Room 3105 via stretcher by ED tech. Patient transported with bedside cardiac monitor. IV site clean, dry, and intact. MEWS score and pain assessed as 1 and documented. Updated patient and family on plan of care.      Moni Vivas RN  11/18/20 1010

## 2020-11-18 NOTE — H&P
H&P dictated--IMP: Principal Problem:    Dyspnea on exertion--progressive--hypoxia with exertion and tachycardia and weakness --COVID--negative  Active Problems:    Chronic systolic congestive heart failure (HCC)--stable     Hyperlipidemia    Hypothyroidism    Presence of biventricular AICD  COPD--uses symbicort and alb at home/nocturnal home O2    Resolved Problems:    * No resolved hospital problems.  *    Admit--HHN/O2/pulm consult/monitor/CTA chest--sl incr D-dimer     Ahsan JORDAN

## 2020-11-18 NOTE — ED NOTES
Pt ambulated with pulse ox as requested by MONSE Morrell. Pt c/o feeling dizzy and sob while ambulating. O2 sat decreased to 89% with heart rate increasing into the 90's while ambulating. MONSE Morrell made aware.       Monica Mahan RN  11/17/20 2019

## 2020-11-19 LAB
ANION GAP SERPL CALCULATED.3IONS-SCNC: 11 MMOL/L (ref 3–16)
BUN BLDV-MCNC: 21 MG/DL (ref 7–20)
CALCIUM SERPL-MCNC: 9 MG/DL (ref 8.3–10.6)
CHLORIDE BLD-SCNC: 99 MMOL/L (ref 99–110)
CO2: 26 MMOL/L (ref 21–32)
CREAT SERPL-MCNC: 1.1 MG/DL (ref 0.8–1.3)
GFR AFRICAN AMERICAN: >60
GFR NON-AFRICAN AMERICAN: >60
GLUCOSE BLD-MCNC: 163 MG/DL (ref 70–99)
LV EF: 53 %
LVEF MODALITY: NORMAL
POTASSIUM SERPL-SCNC: 4.1 MMOL/L (ref 3.5–5.1)
S PYO THROAT QL CULT: NORMAL
SODIUM BLD-SCNC: 136 MMOL/L (ref 136–145)

## 2020-11-19 PROCEDURE — 99232 SBSQ HOSP IP/OBS MODERATE 35: CPT | Performed by: INTERNAL MEDICINE

## 2020-11-19 PROCEDURE — 1200000000 HC SEMI PRIVATE

## 2020-11-19 PROCEDURE — 6370000000 HC RX 637 (ALT 250 FOR IP): Performed by: INTERNAL MEDICINE

## 2020-11-19 PROCEDURE — 99223 1ST HOSP IP/OBS HIGH 75: CPT | Performed by: INTERNAL MEDICINE

## 2020-11-19 PROCEDURE — 6360000002 HC RX W HCPCS: Performed by: INTERNAL MEDICINE

## 2020-11-19 PROCEDURE — 80048 BASIC METABOLIC PNL TOTAL CA: CPT

## 2020-11-19 PROCEDURE — 94761 N-INVAS EAR/PLS OXIMETRY MLT: CPT

## 2020-11-19 PROCEDURE — 94640 AIRWAY INHALATION TREATMENT: CPT

## 2020-11-19 PROCEDURE — 36415 COLL VENOUS BLD VENIPUNCTURE: CPT

## 2020-11-19 PROCEDURE — 99233 SBSQ HOSP IP/OBS HIGH 50: CPT | Performed by: INTERNAL MEDICINE

## 2020-11-19 PROCEDURE — 93306 TTE W/DOPPLER COMPLETE: CPT

## 2020-11-19 PROCEDURE — 2580000003 HC RX 258: Performed by: INTERNAL MEDICINE

## 2020-11-19 RX ORDER — ASPIRIN 81 MG/1
81 TABLET, CHEWABLE ORAL DAILY
Status: DISCONTINUED | OUTPATIENT
Start: 2020-11-20 | End: 2020-11-20 | Stop reason: HOSPADM

## 2020-11-19 RX ORDER — LISINOPRIL 5 MG/1
5 TABLET ORAL DAILY
Status: DISCONTINUED | OUTPATIENT
Start: 2020-11-19 | End: 2020-11-20 | Stop reason: HOSPADM

## 2020-11-19 RX ADMIN — Medication 10.5 MG: at 21:25

## 2020-11-19 RX ADMIN — ENOXAPARIN SODIUM 40 MG: 40 INJECTION SUBCUTANEOUS at 08:20

## 2020-11-19 RX ADMIN — ASPIRIN 325 MG ORAL TABLET 325 MG: 325 PILL ORAL at 08:20

## 2020-11-19 RX ADMIN — LISINOPRIL 5 MG: 5 TABLET ORAL at 18:32

## 2020-11-19 RX ADMIN — IPRATROPIUM BROMIDE AND ALBUTEROL SULFATE 1 AMPULE: .5; 3 SOLUTION RESPIRATORY (INHALATION) at 12:35

## 2020-11-19 RX ADMIN — CARVEDILOL 12.5 MG: 6.25 TABLET, FILM COATED ORAL at 18:32

## 2020-11-19 RX ADMIN — IPRATROPIUM BROMIDE AND ALBUTEROL SULFATE 1 AMPULE: .5; 3 SOLUTION RESPIRATORY (INHALATION) at 15:50

## 2020-11-19 RX ADMIN — PREDNISONE 40 MG: 20 TABLET ORAL at 08:20

## 2020-11-19 RX ADMIN — BUDESONIDE AND FORMOTEROL FUMARATE DIHYDRATE 2 PUFF: 160; 4.5 AEROSOL RESPIRATORY (INHALATION) at 08:30

## 2020-11-19 RX ADMIN — IPRATROPIUM BROMIDE AND ALBUTEROL SULFATE 1 AMPULE: .5; 3 SOLUTION RESPIRATORY (INHALATION) at 08:30

## 2020-11-19 RX ADMIN — CARVEDILOL 12.5 MG: 6.25 TABLET, FILM COATED ORAL at 08:20

## 2020-11-19 RX ADMIN — ATORVASTATIN CALCIUM 40 MG: 40 TABLET, FILM COATED ORAL at 21:25

## 2020-11-19 RX ADMIN — CITALOPRAM HYDROBROMIDE 40 MG: 10 TABLET ORAL at 08:20

## 2020-11-19 RX ADMIN — AMITRIPTYLINE HYDROCHLORIDE 25 MG: 25 TABLET, FILM COATED ORAL at 21:26

## 2020-11-19 RX ADMIN — SODIUM CHLORIDE, PRESERVATIVE FREE 10 ML: 5 INJECTION INTRAVENOUS at 08:21

## 2020-11-19 RX ADMIN — SODIUM CHLORIDE, PRESERVATIVE FREE 10 ML: 5 INJECTION INTRAVENOUS at 21:00

## 2020-11-19 RX ADMIN — LEVOTHYROXINE SODIUM 50 MCG: 0.05 TABLET ORAL at 08:20

## 2020-11-19 RX ADMIN — SPIRONOLACTONE 25 MG: 25 TABLET ORAL at 08:20

## 2020-11-19 RX ADMIN — IPRATROPIUM BROMIDE AND ALBUTEROL SULFATE 1 AMPULE: .5; 3 SOLUTION RESPIRATORY (INHALATION) at 20:48

## 2020-11-19 RX ADMIN — BUDESONIDE AND FORMOTEROL FUMARATE DIHYDRATE 2 PUFF: 160; 4.5 AEROSOL RESPIRATORY (INHALATION) at 20:49

## 2020-11-19 ASSESSMENT — PAIN SCALES - GENERAL: PAINLEVEL_OUTOF10: 0

## 2020-11-19 NOTE — PROGRESS NOTES
Patient is in bed resting. Assessment completed and charted. Patient is c/o HA, PRN Tylenol given per order. Patient denies any other needs at this time. Call light is within reach.      Electronically signed by Marilia Martinez RN on 11/18/2020 at 8:23 PM

## 2020-11-19 NOTE — PLAN OF CARE
Problem: OXYGENATION/RESPIRATORY FUNCTION  Goal: Patient will maintain patent airway  11/18/2020 2020 by Charles Jean-Baptiste RN  Outcome: Ongoing  11/18/2020 1140 by Dwayne Cheema RN  Outcome: Ongoing  Note: Patient has maintained a patent airway, repositions self, lung sounds bilaterally diminished, cough noted and is occasional, RT is involved in patient care and is providing care, patient has had adequate fluid intake and is not on a fluid restriction, no need to suction airway at this time, educated patient to turn and cough and deep breathe every two hours and as needed, encouraged incentive spirometer and patient has been performing. Will continue to monitor and reassess. Electronically signed by Dwayne Cheema RN on 11/18/2020 at 11:41 AM    Goal: Patient will achieve/maintain normal respiratory rate/effort  Description: Respiratory rate and effort will be within normal limits for the patient  11/18/2020 2020 by Charles Jean-Baptiste RN  Outcome: Ongoing  11/18/2020 1140 by Dwayne Cheema RN  Outcome: Ongoing     Problem: HEMODYNAMIC STATUS  Goal: Patient has stable vital signs and fluid balance  11/18/2020 2020 by Charles Jean-Baptiste RN  Outcome: Ongoing  11/18/2020 1140 by Dwayne Cheema RN  Outcome: Ongoing  Note: Pt VSS and WDL. Pt is a daily weight, strict intake and output being monitored, pt extremity neuro checks WDL. Pt is encouraged to deep breathe and cough. Pt encouraged to monitor fluid intake to prevent fluid overload. Will continue to monitor and assess.    Electronically signed by Dwayne Cheema RN on 11/18/2020 at 11:41 AM       Problem: FLUID AND ELECTROLYTE IMBALANCE  Goal: Fluid and electrolyte balance are achieved/maintained  11/18/2020 2020 by Charles Jean-Baptiste RN  Outcome: Ongoing  11/18/2020 1140 by Dwayne Cheema RN  Outcome: Ongoing     Problem: ACTIVITY INTOLERANCE/IMPAIRED MOBILITY  Goal: Mobility/activity is maintained at optimum level for patient  11/18/2020 2020 by Charles Jean-Baptiste RN  Outcome: Ongoing  11/18/2020 1140 by Earlene Garcia RN  Outcome: Ongoing     Problem: Falls - Risk of:  Goal: Will remain free from falls  Description: Will remain free from falls  11/18/2020 2020 by Jadon Zuleta RN  Outcome: Ongoing  11/18/2020 1140 by Earlene Garcia RN  Outcome: Ongoing  Note: Fall risk assessment completed. Fall precautions in place. Call light within reach. Pt educated on calling for assistance before getting up. Walkway free of clutter. Will continue to monitor.    Electronically signed by Earlene Garcia RN on 11/18/2020 at 11:41 AM    Goal: Absence of physical injury  Description: Absence of physical injury  11/18/2020 2020 by Jadon Zuleta RN  Outcome: Ongoing  11/18/2020 1140 by Earlene Garcia RN  Outcome: Ongoing

## 2020-11-19 NOTE — PLAN OF CARE
Problem: OXYGENATION/RESPIRATORY FUNCTION  Goal: Patient will maintain patent airway  Outcome: Ongoing  Note: Pt maintaining a patent airway. No distress noted. Will monitor. Goal: Patient will achieve/maintain normal respiratory rate/effort  Description: Respiratory rate and effort will be within normal limits for the patient  Outcome: Ongoing  Note: Pt maintaining a normal respiratory rate. No distress noted. Will monitor. Problem: HEMODYNAMIC STATUS  Goal: Patient has stable vital signs and fluid balance  Outcome: Ongoing  Note: Pt vital signs stable. Will monitor patient. Problem: FLUID AND ELECTROLYTE IMBALANCE  Goal: Fluid and electrolyte balance are achieved/maintained  Outcome: Ongoing  Note: Pt fluid and electrolytes are being maintained. Will monitor. Problem: ACTIVITY INTOLERANCE/IMPAIRED MOBILITY  Goal: Mobility/activity is maintained at optimum level for patient  Outcome: Ongoing  Note: Pt maintaining mobility. Ambulating in the room. Will monitor. Problem: Falls - Risk of:  Goal: Will remain free from falls  Description: Will remain free from falls  Outcome: Ongoing  Note: Fall risk assessment completed. Fall precautions in place. Call light within reach. Pt educated on calling for assistance. Walkway free of clutter. Will continue to monitor. Goal: Absence of physical injury  Description: Absence of physical injury  Outcome: Ongoing  Note: Pt is free of injury. No injury noted. Fall precautions in place. Call light within reach. Will monitor. Problem: Pain:  Goal: Pain level will decrease  Description: Pain level will decrease  Outcome: Ongoing  Note: Pt assessed for pain. Pt denies any pain at this time. Will continue to monitor pt and assess for pain throughout rest of shift. Goal: Control of acute pain  Description: Control of acute pain  Outcome: Ongoing  Note: Pt assessed for pain. Pt denies any pain at this time.  Will continue to monitor pt and assess for pain

## 2020-11-19 NOTE — PROGRESS NOTES
Pt resting quietly in bed with eyes closed, resp easy and unlabored. No s/s of acute distress noted at this time. Appears comfortable. Call light is within reach. Will continue to monitor.      Electronically signed by Denise Layne RN on 11/18/2020 at 10:38 PM

## 2020-11-19 NOTE — PROGRESS NOTES
Alicia SILVA [facility] Progress Note  11/19/2020 8:35 AM  Subjective:   Admit Date: 11/17/2020      Chief Complaint: Still SOB even going to [facility]--I do not feel better      Interval History: Remains with STANLEY   CTA neg for PE   He had a neg nuc stress test sev mos ago at [facility]--Dr Ector Dyer  ? ? Etiology of sx ?? Diet: DIET CARDIAC; Low Sodium (2 GM); Daily Fluid Restriction: 2000 ml  Medications:   Scheduled Meds:   predniSONE  40 mg Oral Daily    ipratropium-albuterol  1 ampule Inhalation Q4H WA    melatonin  10.5 mg Oral Nightly    sodium chloride flush  10 mL Intravenous 2 times per day    enoxaparin  40 mg Subcutaneous Daily    amitriptyline  25 mg Oral Nightly    aspirin  325 mg Oral Daily    atorvastatin  40 mg Oral Nightly    budesonide-formoterol  2 puff Inhalation BID    carvedilol  12.5 mg Oral BID WC    citalopram  40 mg Oral Daily    levothyroxine  50 mcg Oral Daily    spironolactone  25 mg Oral Daily     Continuous Infusions:    Review of Systems -   General ROS: afebrile  Respiratory ROS: positive for - shortness of breath  Cardiovascular ROS: no chest pain  Musculoskeletal ROS:positive for - low back pain   Neurological ROS: no TIA or stroke symptoms    Objective:   Vitals: /69   Pulse 68   Temp 98.4 °F (36.9 °C) (Oral)   Resp 18   Ht 5' 10\" (1.778 m)   Wt 211 lb 6.7 oz (95.9 kg)   SpO2 94%   BMI 30.34 kg/m²   General appearance: alert and cooperative with exam  HEENT: Head: Normocephalic, no lesions, without obvious abnormality.   Neck: no adenopathy, no carotid bruit, no JVD, supple, symmetrical, trachea midline and thyroid not enlarged, symmetric, no tenderness/mass/nodules  Lungs: clear to auscultation bilaterally  Heart: regular rate and rhythm, S1, S2 normal, no murmur, click, rub or gallop  Abdomen: soft, non-tender; bowel sounds normal; no masses,  no organomegaly  Extremities: extremities normal, atraumatic, no cyanosis or edema  Neurologic: Mental status: Alert, oriented, thought content appropriate          Assessment & Plan:   Principal Problem:    Dyspnea on exertion--?? Etiology--CTA neg for PE --I will enc activity and ck O2 sats today--await pulm thoughts? ? Active Problems:    Chronic systolic congestive heart failure (HCC)--none to exam --BNP is 63      Hyperlipidemia    Hypothyroidism    Presence of biventricular AICD    Dyspnea    Chronic obstructive pulmonary disease (HCC)    Nocturnal hypoxia  Resolved Problems:    * No resolved hospital problems. *  Will incr activity and ck O2 sats --get cardiol eval here--??  Etiology--he is not a complainer   Will dc prednisone --as per pulm he is not wheezing    Please note that over 35 minutes was spent in evaluating the patient, review of records and results, discussion with staff/family, etc.    Glendy Washburn MD

## 2020-11-19 NOTE — CONSULTS
Referring Physician: Dr. Enrrique Huffman  Reason for Consultation: STANLEY and hypoxia. History of CHF. Chief Complaint: Short of breath    Subjective:   History of Present Illness:  Juve Dai is a 79 y.o. patient who presented to the hospital with complaints of progressive shortness of breath over the past week. He was also having a sore throat and headaches. He saw a nurse practitioner at his PCPs office who recommended evaluation in the emergency department. He reports shortness of breath with minimal exertion but denies dyspnea at rest.  He denies associated chest pain. He denies PND, orthopnea, lower extremity edema, or weight gain. There was no initial obvious etiology of his shortness of breath but because his breathing appeared still labored in the emergency department he was admitted to the hospital.  His troponin was normal.  His proBNP was only 62. He has a history of a nonischemic cardiomyopathy s/p BiV ICD but his ejection fraction normalized. He typically follows with [facility] cardiology and was seen about 10 days ago. At that time, he was feeling well and his device was interrogated which showed normal function. The patient denies any adverse drug reactions to cardiovascular medicines but states that his spironolactone was discontinued approximately 6 months ago and believes the lisinopril was discontinued about 2 years ago. He is unaware of any specific reason to discontinue the medication but believes they were stopped by one of his physicians. He reports a history of COPD and wears oxygen at night. Pulmonary was also consulted. He tested negative for COVID-19.     Past Medical History:   has a past medical history of AICD (automatic implantable cardioverter defibrillator) at end of life, Anxiety and depression, Arthritis, Back pain, CHF (congestive heart failure) ([address] GERD (gastroesophageal reflux disease), History of blood transfusion, Hyperlipidemia, Hypertension, Hypothyroidism, S/P cardiac cath, Sleep apnea, and Wears glasses. Surgical History:   has a past surgical history that includes lumbar laminectomy (9-2007(L2-3), (L4-5), 2015); Knee arthroscopy; Knee cartilage surgery (8-2008); Elbow surgery (Bilateral); Hammer toe surgery; joint replacement (Left, ); TURP; and Colonoscopy (05/11/2016). Social History:   reports that he quit smoking about 50 years ago. His smoking use included cigarettes. He started smoking about 60 years ago. He has a 2.50 pack-year smoking history. He has never used smokeless tobacco. He reports current alcohol use of about 14.0 standard drinks of alcohol per week. He reports that he does not use drugs. Family History:  family history includes Breast Cancer in his mother; Other in his daughter and father. Home Medications:  Were reviewed and are listed in nursing record and/or below  Prior to Admission medications    Medication Sig Start Date End Date Taking?  Authorizing Provider   citalopram (CELEXA) 40 MG tablet TAKE 1 TABLET BY MOUTH EVERY MORNING  Patient taking differently: Take 40 mg by mouth nightly  11/3/20  Yes Mouna Sr MD   atorvastatin (LIPITOR) 40 MG tablet TAKE 1 TABLET BY MOUTH AT BEDTIME 11/3/20  Yes Mouna Sr MD   budesonide-formoterol [facility]) 160-4.5 MCG/ACT AERO Inhale 2 puffs into the lungs 2 times daily 7/1/20  Yes Historical Provider, MD   tiotropium (SPIRIVA RESPIMAT) 2.5 MCG/ACT AERS inhaler Inhale 2 puffs into the lungs daily 5/18/20  Yes Historical Provider, MD   albuterol sulfate  (90 Base) MCG/ACT inhaler Inhale 2 puffs into the lungs every 6 hours as needed 5/18/20  Yes Historical Provider, MD   levothyroxine (SYNTHROID) 50 MCG tablet TAKE 1 TABLET BY MOUTH DAILY  Patient taking differently: Take 50 mcg by mouth nightly  4/28/20  Yes Mouna Sr MD   carvedilol (COREG) 12.5 MG tablet TAKE 1 TABLET BY MOUTH TWICE DAILY WITH MEALS 2/5/20  Yes Mouna Sr MD hematuria. · Musculoskeletal:  No gait disturbance, no joint complaints. · Integumentary: No rash or pruritis. · Neurological: No headache, diplopia, change in muscle strength, numbness or tingling. · Psychiatric: No anxiety or depression. · Endocrine: No temperature intolerance. No excessive thirst, fluid intake, or urination. No tremor. · Hematologic/Lymphatic: No abnormal bruising or bleeding, blood clots or swollen lymph nodes. · Allergic/Immunologic: No nasal congestion or hives. Objective:   PHYSICAL EXAM:    Vitals:    11/19/20 1130   BP: (!) 147/84   Pulse: 60   Resp: 16   Temp: 98.6 °F (37 °C)   SpO2: 95%    Weight: 211 lb 6.7 oz (95.9 kg)       General Appearance:  Alert, cooperative, no distress, appears stated age. Head:  Normocephalic, without obvious abnormality, atraumatic. Eyes:  Pupils equal and round. No scleral icterus. Mouth: Moist mucosa, no pharyngeal erythema. Nose: Nares normal. No drainage or sinus tenderness. Neck: Supple, symmetrical, trachea midline. No adenopathy. No tenderness/mass/nodules. No carotid bruit or elevated JVD. Lungs:   Clear to auscultation bilaterally, respirations unlabored. No wheeze, rales, or rhonchi. Chest Wall:  No tenderness or deformity. Heart:  Regular rate. S1/S2 normal. No murmur, rub, or gallop. Abdomen:   Soft, non-tender, bowel sounds active. Musculoskeletal: No muscle wasting or digital clubbing. Extremities: Extremities normal, atraumatic. No cyanosis or edema. Pulses: 2+ radial and carotid pulses, symmetric. Skin: No rashes or lesions. Pysch: Normal mood and affect. Alert and oriented x 4.    Neurologic: Normal gross motor and sensory exam.       Labs     CBC:   Lab Results   Component Value Date    WBC 7.4 11/18/2020    RBC 4.73 11/18/2020    HGB 14.5 11/18/2020    HCT 43.3 11/18/2020    MCV 91.5 11/18/2020    RDW 14.0 11/18/2020     11/18/2020     CMP:  Lab Results   Component Value Date     11/19/2020 K 4.1 2020    K 4.1 2020    CL 99 2020    CO2 26 2020    BUN 21 2020    CREATININE 1.1 2020    GFRAA >60 2020    GFRAA 59 2012    AGRATIO 1.4 2020    LABGLOM >60 2020    GLUCOSE 163 2020    PROT 6.9 2020    PROT 7.4 2012    CALCIUM 9.0 2020    BILITOT 0.9 2020    ALKPHOS 89 2020    AST 27 2020    ALT 28 2020     PT/INR:  No results found for: PTINR  HgBA1c:  Lab Results   Component Value Date    LABA1C 6.1 2017     Lab Results   Component Value Date    TROPONINI <0.01 2020       Cardiac Data     EK2020. Electronic ventricular pacemaker. Echo: 10/2019  There is a pacemaker lead in the right ventricle. Overall left ventricular ejection fraction is estimated to be 50-55%. The left ventricular function is low normal.  There is an abnormal contraction sequence due to paced rhythm. There is trace mitral regurgitation. Trace aortic regurgitation. There is no tricuspid valve vegetation. Stress Test: 2020  The LV EF is 69%  Normal global and regional wall motion in all territories. There is a medium size, fixed defect in the apical wall consistent with soft tissue attenuation. Cath: 2009  LEFT MAIN: Normal                 LEFT ANTERIOR DESCENDIN% mid           LEFT CIRCUMFLEX: Normal                 RIGHT CORONARY:  Normal                 Assessment and Plan   1) Chronic systolic heart failure s/p BiV ICD. EF 55%. Patient appears euvolemic. Shortness of breath seems unlikely to be related to heart failure. Device interrogation 11/10/2020 shows normal function. Repeat echocardiogram ordered but could be deferred to the outpatient setting. Continue carvedilol and Aldactone. Will restart lisinopril. 2) Essential hypertension. Uncontrolled. Goal BP <130/80. Continue medical therapy. Will start lisinopril; titrate as tolerated. 3) CAD. Asymptomatic.  Continue with medical management and risk factor modification including aspirin, statin, and B-blocker. 4) Dyspnea on exertion/COPD with nocturnal hypoxia. Pulmonary consulted. Etiology of the acute dyspnea uncertain but he is ruled out for PE and ACS. CHF seems unlikely. Question if the patient has a non-COVID-19 viral URI. Overall, the problems requiring hospitalization are high in severity. Patient could be discharged from a cardiovascular perspective. The patient voices his wishes to transfer care from [address] [place] [address] cardiology to the [place] [address]. Will arrange for outpatient follow-up and transition to our device clinic as well. Thank you for allowing us to participate in the care of [place]. Annabelle Carrera.  Jordi Mauricio, [address] [place] [address] Road  11/19/2020 10:13 AM

## 2020-11-19 NOTE — PROGRESS NOTES
Pulmonary Progress Note    CC:  Follow up dyspnea     Subjective:  2 liters of oxygen overnight   Room air now  SOB with any exertion  Duonebs and prednisone not helping  Left chest wall tinge today       Intake/Output Summary (Last 24 hours) at 11/19/2020 0732  Last data filed at 11/18/2020 0856  Gross per 24 hour   Intake 240 ml   Output --   Net 240 ml         PHYSICAL EXAM:  Blood pressure (!) 165/85, pulse 67, temperature 97.7 °F (36.5 °C), temperature source Oral, resp. rate 16, height 5' 10\" (1.778 m), weight 211 lb 6.7 oz (95.9 kg), SpO2 95 %.'  Gen: No distress. Eyes: PERRL. No sclera icterus. No conjunctival injection. ENT: No discharge. Pharynx clear. External appearance of ears and nose normal.  Neck: Trachea midline. No obvious mass. Resp: Essentially clear but diminished today   CV: Regular rate. Regular rhythm. No murmur or rub. GI: Non-tender. Non-distended. No hernia. Skin: Warm, dry, normal texture and turgor. No nodule on exposed extremities. Lymph: No cervical LAD. No supraclavicular LAD. M/S: No cyanosis. No clubbing. No joint deformity. Neuro: Moves all four extremities. CN 2-12 tested, no defect noted.   Ext:   traced edema    Medications:    Scheduled Meds:   predniSONE  40 mg Oral Daily    ipratropium-albuterol  1 ampule Inhalation Q4H WA    melatonin  10.5 mg Oral Nightly    sodium chloride flush  10 mL Intravenous 2 times per day    enoxaparin  40 mg Subcutaneous Daily    amitriptyline  25 mg Oral Nightly    aspirin  325 mg Oral Daily    atorvastatin  40 mg Oral Nightly    budesonide-formoterol  2 puff Inhalation BID    carvedilol  12.5 mg Oral BID WC    citalopram  40 mg Oral Daily    levothyroxine  50 mcg Oral Daily    spironolactone  25 mg Oral Daily       Continuous Infusions:      PRN Meds:  sodium chloride flush, acetaminophen **OR** acetaminophen, polyethylene glycol, promethazine **OR** ondansetron, albuterol    Labs:  CBC:   Recent Labs 11/17/20 1807 11/18/20 0439   WBC 6.8 7.4   HGB 14.7 14.5   HCT 44.8 43.3   MCV 92.6 91.5    193     BMP:   Recent Labs     11/17/20 1807 11/18/20 0439    141   K 4.1 4.1    104   CO2 23 25   BUN 18 20   CREATININE 1.1 1.2     LIVER PROFILE:   Recent Labs     11/17/20 1807   AST 27   ALT 28   BILITOT 0.9   ALKPHOS 89     PT/INR: No results for input(s): PROTIME, INR in the last 72 hours. APTT: No results for input(s): APTT in the last 72 hours. UA:  Recent Labs     11/18/20 0423   COLORU YELLOW   PHUR 6.0   CLARITYU Clear   SPECGRAV >1.030   LEUKOCYTESUR Negative   UROBILINOGEN 1.0   BILIRUBINUR Negative   BLOODU Negative   GLUCOSEU Negative     No results for input(s): PH, PCO2, PO2 in the last 72 hours. Films:  Chest imaging reports were reviewed and imaging was reviewed by me and showed mild atelectasis, possible mosaic pattern, hiatal hernia, no obvious PE, calcified granuloma     ABG:  No new draws    Cultures:  None    I reviewed the labs and images listed above    ASSESSMENT:  · Acute Dyspnea, unclear. Lung clear on exam.  Less likely lungs as cause as he is not responding to therapy for lung disease   · COPD per history. Says chronic bronchitis but he does not have productive cough at this time   · Nocturnal Hypoxia with 2 liters. Probably has MARILYNN     PLAN:  · Supply oxygen with sleep  · Continue with bronchodilators as is  · DVT prophylaxis with Lovenox  · I am ambivalent about prednisone. He has no wheezing  · Ambulate with RN. Check vitals with ambulation.           Tarassukhdeep Cleaning, DO  [facility]

## 2020-11-20 ENCOUNTER — APPOINTMENT (OUTPATIENT)
Dept: GENERAL RADIOLOGY | Age: 80
DRG: 206 | End: 2020-11-20
Payer: MEDICARE

## 2020-11-20 VITALS
RESPIRATION RATE: 16 BRPM | SYSTOLIC BLOOD PRESSURE: 116 MMHG | DIASTOLIC BLOOD PRESSURE: 57 MMHG | TEMPERATURE: 98.3 F | OXYGEN SATURATION: 96 % | HEART RATE: 81 BPM | BODY MASS INDEX: 29.98 KG/M2 | HEIGHT: 70 IN | WEIGHT: 209.44 LBS

## 2020-11-20 LAB
ANION GAP SERPL CALCULATED.3IONS-SCNC: 11 MMOL/L (ref 3–16)
BASOPHILS ABSOLUTE: 0 K/UL (ref 0–0.2)
BASOPHILS RELATIVE PERCENT: 0.4 %
BUN BLDV-MCNC: 19 MG/DL (ref 7–20)
CALCIUM SERPL-MCNC: 9.2 MG/DL (ref 8.3–10.6)
CHLORIDE BLD-SCNC: 101 MMOL/L (ref 99–110)
CO2: 27 MMOL/L (ref 21–32)
CREAT SERPL-MCNC: 1.1 MG/DL (ref 0.8–1.3)
EOSINOPHILS ABSOLUTE: 0 K/UL (ref 0–0.6)
EOSINOPHILS RELATIVE PERCENT: 0.1 %
GFR AFRICAN AMERICAN: >60
GFR NON-AFRICAN AMERICAN: >60
GLUCOSE BLD-MCNC: 101 MG/DL (ref 70–99)
HCT VFR BLD CALC: 43.9 % (ref 40.5–52.5)
HEMOGLOBIN: 14.4 G/DL (ref 13.5–17.5)
LYMPHOCYTES ABSOLUTE: 1.6 K/UL (ref 1–5.1)
LYMPHOCYTES RELATIVE PERCENT: 16.3 %
MCH RBC QN AUTO: 30.6 PG (ref 26–34)
MCHC RBC AUTO-ENTMCNC: 32.8 G/DL (ref 31–36)
MCV RBC AUTO: 93.3 FL (ref 80–100)
MONOCYTES ABSOLUTE: 0.6 K/UL (ref 0–1.3)
MONOCYTES RELATIVE PERCENT: 6.2 %
NEUTROPHILS ABSOLUTE: 7.4 K/UL (ref 1.7–7.7)
NEUTROPHILS RELATIVE PERCENT: 77 %
PDW BLD-RTO: 13.9 % (ref 12.4–15.4)
PLATELET # BLD: 173 K/UL (ref 135–450)
PMV BLD AUTO: 8.5 FL (ref 5–10.5)
POTASSIUM SERPL-SCNC: 4.1 MMOL/L (ref 3.5–5.1)
RBC # BLD: 4.7 M/UL (ref 4.2–5.9)
SODIUM BLD-SCNC: 139 MMOL/L (ref 136–145)
WBC # BLD: 9.6 K/UL (ref 4–11)

## 2020-11-20 PROCEDURE — 80048 BASIC METABOLIC PNL TOTAL CA: CPT

## 2020-11-20 PROCEDURE — 99232 SBSQ HOSP IP/OBS MODERATE 35: CPT | Performed by: INTERNAL MEDICINE

## 2020-11-20 PROCEDURE — 6360000002 HC RX W HCPCS: Performed by: INTERNAL MEDICINE

## 2020-11-20 PROCEDURE — 94760 N-INVAS EAR/PLS OXIMETRY 1: CPT

## 2020-11-20 PROCEDURE — 36415 COLL VENOUS BLD VENIPUNCTURE: CPT

## 2020-11-20 PROCEDURE — 71045 X-RAY EXAM CHEST 1 VIEW: CPT

## 2020-11-20 PROCEDURE — 2580000003 HC RX 258: Performed by: INTERNAL MEDICINE

## 2020-11-20 PROCEDURE — 94640 AIRWAY INHALATION TREATMENT: CPT

## 2020-11-20 PROCEDURE — 99239 HOSP IP/OBS DSCHRG MGMT >30: CPT | Performed by: INTERNAL MEDICINE

## 2020-11-20 PROCEDURE — 85025 COMPLETE CBC W/AUTO DIFF WBC: CPT

## 2020-11-20 PROCEDURE — 6370000000 HC RX 637 (ALT 250 FOR IP): Performed by: INTERNAL MEDICINE

## 2020-11-20 RX ORDER — LISINOPRIL 5 MG/1
5 TABLET ORAL DAILY
Qty: 30 TABLET | Refills: 3 | Status: SHIPPED | OUTPATIENT
Start: 2020-11-20 | End: 2020-11-25 | Stop reason: SDUPTHER

## 2020-11-20 RX ORDER — ASPIRIN 81 MG/1
81 TABLET, CHEWABLE ORAL DAILY
Qty: 30 TABLET | Refills: 3 | Status: SHIPPED | OUTPATIENT
Start: 2020-11-20

## 2020-11-20 RX ADMIN — SODIUM CHLORIDE, PRESERVATIVE FREE 10 ML: 5 INJECTION INTRAVENOUS at 08:55

## 2020-11-20 RX ADMIN — CITALOPRAM HYDROBROMIDE 40 MG: 10 TABLET ORAL at 08:53

## 2020-11-20 RX ADMIN — IPRATROPIUM BROMIDE AND ALBUTEROL SULFATE 1 AMPULE: .5; 3 SOLUTION RESPIRATORY (INHALATION) at 08:22

## 2020-11-20 RX ADMIN — ASPIRIN 81 MG: 81 TABLET, CHEWABLE ORAL at 08:53

## 2020-11-20 RX ADMIN — BUDESONIDE AND FORMOTEROL FUMARATE DIHYDRATE 2 PUFF: 160; 4.5 AEROSOL RESPIRATORY (INHALATION) at 08:22

## 2020-11-20 RX ADMIN — LISINOPRIL 5 MG: 5 TABLET ORAL at 08:53

## 2020-11-20 RX ADMIN — SPIRONOLACTONE 25 MG: 25 TABLET ORAL at 08:53

## 2020-11-20 RX ADMIN — LEVOTHYROXINE SODIUM 50 MCG: 0.05 TABLET ORAL at 08:53

## 2020-11-20 RX ADMIN — ENOXAPARIN SODIUM 40 MG: 40 INJECTION SUBCUTANEOUS at 08:53

## 2020-11-20 RX ADMIN — CARVEDILOL 12.5 MG: 6.25 TABLET, FILM COATED ORAL at 08:53

## 2020-11-20 ASSESSMENT — PAIN SCALES - GENERAL: PAINLEVEL_OUTOF10: 0

## 2020-11-20 NOTE — PROGRESS NOTES
[facility]lic[facility] SILVA [facility] Progress Note  11/20/2020 7:35 AM  Subjective:   Admit Date: 11/17/2020      Chief Complaint: Monitor showed 18 beat VT overnite--will ask cardiol to review   Echo--EF=55%      Interval History: O2 sats are now stable off O2   Echo--EF=55%  cardiol has seen--restart lisinopril   18 beat VT ? ? -overnite --ask cardiol to review     Diet: DIET CARDIAC; Low Sodium (2 GM); Daily Fluid Restriction: 2000 ml  Medications:   Scheduled Meds:   lisinopril  5 mg Oral Daily    aspirin  81 mg Oral Daily    ipratropium-albuterol  1 ampule Inhalation Q4H WA    melatonin  10.5 mg Oral Nightly    sodium chloride flush  10 mL Intravenous 2 times per day    enoxaparin  40 mg Subcutaneous Daily    amitriptyline  25 mg Oral Nightly    atorvastatin  40 mg Oral Nightly    budesonide-formoterol  2 puff Inhalation BID    carvedilol  12.5 mg Oral BID WC    citalopram  40 mg Oral Daily    levothyroxine  50 mcg Oral Daily    spironolactone  25 mg Oral Daily     Continuous Infusions:    Review of Systems -   General ROS: afebrile  Respiratory ROS: no cough, shortness of breath or wheezing  Cardiovascular ROS: no chest pain  Musculoskeletal ROS:positive for - :joint pain  Neurological ROS: no TIA or stroke symptoms    Objective:   Vitals: BP (!) 142/79   Pulse 68   Temp 97.4 °F (36.3 °C) (Oral)   Resp 16   Ht 5' 10\" (1.778 m)   Wt 209 lb 7 oz (95 kg)   SpO2 96%   BMI 30.05 kg/m²   General appearance: alert and cooperative with exam  HEENT: Head: Normocephalic, no lesions, without obvious abnormality.   Neck: no adenopathy, no carotid bruit, no JVD, supple, symmetrical, trachea midline and thyroid not enlarged, symmetric, no tenderness/mass/nodules  Lungs: clear to auscultation bilaterally  Heart: regular rate and rhythm, S1, S2 normal, no murmur, click, rub or gallop  Abdomen: soft, non-tender; bowel sounds normal; no masses,  no organomegaly  Extremities: extremities normal, atraumatic, no cyanosis or edema  Neurologic: Mental status: Alert, oriented, thought content appropriate          Assessment & Plan:   Principal Problem:    Dyspnea on exertion--improved --he feels better   Active Problems:    Chronic systolic heart failure (NTB)--YM=49%    Hyperlipidemia    Hypothyroidism    Presence of biventricular AICD    Dyspnea    Chronic obstructive pulmonary disease (HCC)    Nocturnal hypoxia    Biventricular ICD (implantable cardioverter-defibrillator) in place    Essential hypertension--restart lsinopril     Coronary artery disease involving native coronary artery of native heart without angina pectoris  Resolved Problems:    * No resolved hospital problems.  *  Cardiol to review VT--if Ok--plan to DC to home --he wants to follow with Jaxon RODGERS dictated   condit on dc---stable   Please note that over 35 minutes was spent in evaluating the patient, review of records and results, discussion with staff/family, etc.    Miranda Almaguer MD

## 2020-11-20 NOTE — PROGRESS NOTES
Call placed to Dr. Cristo Mcdowell to see if the patient's Amitriptyline should be restarted at discharge. Awaiting return call.  Electronically signed by Dhiraj Reynolds RN on 11/20/2020 at 10:06 AM

## 2020-11-20 NOTE — PROGRESS NOTES
This RN received called from [facility] that patient had 18 beats of V-Tach. Patient is asymptomatic. MD paged.     Electronically signed by Colletta Jaegers, RN on 11/20/2020 at 1:43 AM

## 2020-11-20 NOTE — PROGRESS NOTES
[facility]   Daily Progress Note      Admit Date:  11/17/2020    Reason for initial consultation: CHF    CC: \"Feeling better\"    HPI: Miguel Castanon is a 79 y.o. patient who presented to the hospital with complaints of progressive shortness of breath over the past week. He was also having a sore throat and headaches. He saw a nurse practitioner at his PCPs office who recommended evaluation in the emergency department. He reports shortness of breath with minimal exertion but denies dyspnea at rest.  He denies associated chest pain. He denies PND, orthopnea, lower extremity edema, or weight gain. There was no initial obvious etiology of his shortness of breath but because his breathing appeared still labored in the emergency department he was admitted to the hospital.  His troponin was normal.  His proBNP was only 62. He has a history of a nonischemic cardiomyopathy s/p BiV ICD but his ejection fraction normalized. He typically follows with [facility] cardiology and was seen about 10 days ago. At that time, he was feeling well and his device was interrogated which showed normal function. The patient denies any adverse drug reactions to cardiovascular medicines but states that his spironolactone was discontinued approximately 6 months ago and believes the lisinopril was discontinued about 2 years ago. He is unaware of any specific reason to discontinue the medication but believes they were stopped by one of his physicians. He reports a history of COPD and wears oxygen at night. Pulmonary was also consulted. He tested negative for COVID-19. Interval History:  Pt with no acute overnight events. Denies chest pain, palpitations, and dyspnea. He feels well enough to go home. Review of Systems:   · Constitutional: No unanticipated weight loss. There's been no change in energy level, sleep pattern, or activity level. No fevers, chills. · Eyes: No visual changes or diplopia.  No scleral Q4H WA    melatonin  10.5 mg Oral Nightly    sodium chloride flush  10 mL Intravenous 2 times per day    enoxaparin  40 mg Subcutaneous Daily    amitriptyline  25 mg Oral Nightly    atorvastatin  40 mg Oral Nightly    budesonide-formoterol  2 puff Inhalation BID    carvedilol  12.5 mg Oral BID     citalopram  40 mg Oral Daily    levothyroxine  50 mcg Oral Daily    spironolactone  25 mg Oral Daily       perflutren lipid microspheres, sodium chloride flush, acetaminophen **OR** acetaminophen, polyethylene glycol, promethazine **OR** ondansetron, albuterol    Lab Data:  CBC:   Recent Labs     20  1807 20  0439   WBC 6.8 7.4   HGB 14.7 14.5    193     BMP:    Recent Labs     20  0439 20  0900 20  0454    136 139   K 4.1 4.1 4.1   CO2 25 26 27   BUN 20 21* 19   CREATININE 1.2 1.1 1.1     LIVR:   Recent Labs     20  1807   AST 27   ALT 28     INR:  No results for input(s): INR in the last 72 hours. APTT: No results for input(s): APTT in the last 72 hours. BNP:  No results for input(s): BNP in the last 72 hours. EK2020. Electronic ventricular pacemaker.     Echo: 20  There is a pacemaker lead in the right ventricle. Overall left ventricular ejection fraction is estimated to be 50-55%. The left ventricular function is low normal.   Paradoxical septal motion secondary to paced rhythm. There is trivial mitral and aortic regurgitation.     Stress Test: 2020  The LV EF is 69%  Normal global and regional wall motion in all territories. There is a medium size, fixed defect in the apical wall consistent with soft tissue attenuation.     Cath: 2009  LEFT MAIN: Normal                 LEFT ANTERIOR DESCENDIN% mid           LEFT CIRCUMFLEX: Normal                 RIGHT CORONARY:  Normal                 Assessment/Plan:    1) Chronic systolic heart failure s/p BiV ICD. EF 50-55%. Patient appears euvolemic.   Shortness of breath seems unlikely to be related to heart failure. Device interrogation 11/10/2020 shows normal function. Continue carvedilol, lisinopril, and Aldactone.    2) Essential hypertension. Controlled. Goal BP <130/80. Continue medical therapy.    3) CAD. Asymptomatic. Continue with medical management and risk factor modification including aspirin, statin, and B-blocker.    4) Dyspnea on exertion/COPD with nocturnal hypoxia. Pulmonary consulted. Etiology of the acute dyspnea uncertain but he is ruled out for PE and ACS. CHF seems unlikely. Question if the patient has a non-COVID-19 viral URI? 5) Nonsustained ventricular tachycardia. Patient protected with ICD. Continue beta-blocker. Overall, the problems requiring hospitalization are high in severity. Will sign off. Call with questions. Will arrange outpatient follow-up.        Electronically signed by Morales Olvera MD on 11/20/2020 at 9:15 AM

## 2020-11-20 NOTE — PROGRESS NOTES
CLINICAL PHARMACY NOTE: MEDS TO [address] Select Patient?: No  Total # of Prescriptions Filled: 2   The following medications were delivered to the patient:  · Aspirin 81mg  · Lisinopril 5mg  Total # of Interventions Completed: 0  Time Spent (min): 15    Additional Documentation:    Patient received medications  Mai Chen CPhT

## 2020-11-20 NOTE — PROGRESS NOTES
Pt resting quietly in bed with eyes closed, resp easy and unlabored. No s/s of acute distress noted at this time. Appears comfortable. Call light is within reach. Will continue to monitor.      Electronically signed by Arti Garvey RN on 11/19/2020 at 10:05 PM

## 2020-11-20 NOTE — DISCHARGE SUMMARY
why his dyspnea persisted. He recommended  continuation of oxygen at 2 L and thought he should be evaluated for  sleep apnea. Thought the bronchodilators would help. He did not think  that short-term steroids were going to be of much value. He was next  seen by Cardiology consultation by Dr. Salvatore Marina. Dr. Chalino Adler  performed an echocardiogram, showed an ejection fraction of 55%. He  does have an AICD in place. Dr. Chalino Adler felt that the patient was  euvolemic. He was not sure of the cause of his dyspnea either. He did  recommend reinstitution of lisinopril 5 mg a day which will be done. Clinically, after several days, his hypoxia did improve. On the 19th,  he was up and ambulated in the [place]. His O2 sats remained in the low to  mid 90s even with ambulation without oxygen. He did have an 18-beat run  of what appeared to be a ventricular tachycardia while sleeping in the  early morning of 11/20. This will be reviewed by Cardiology prior to  discharge today. Clinically, he has improved and the plan will be for  him to be discharged home today if approved by Cardiology. He will go  home on his maintenance medications including citalopram 40 mg daily,  atorvastatin 40 mg daily, Symbicort 160/4.5 two puffs b.i.d., Spiriva  two puffs daily, albuterol inhaler q.6 as needed, Synthroid 50 mcg  daily, carvedilol 12.5 mg daily. Also to start aspirin 81 mg daily, a  decrease from his 325 and also restart his lisinopril 5 mg daily. He  was told to follow with Dr. Blunt Last in about a week. He will also be  given contact information for Dr. Chalino Adler of the Cardiology Service and  Dr. Booker Anglin of the Pulmonary Service.     Earlene Miller MD    D: 11/20/2020 8:00:39       T: 11/20/2020 16:04:42     [email or web address]  Job#: [id number]     Doc#: [id number]    CC:

## 2020-11-20 NOTE — PROGRESS NOTES
Pt A&O in the bed. Pt tolerating PO intake well. AM medications administered without complications. Pt has no complaints at this time. UAL in the room. Call light within reach. Able to make needs known. Fall precautions in place. Will monitor.  Electronically signed by Brittnee Blackmon RN on 11/20/2020 at 9:57 AM

## 2020-11-20 NOTE — PROGRESS NOTES
Patient is up ambulating in room. Assessment complete and charted. Patient denies any pain or SOB. No other needs voiced at this time. Call light is within reach.      Electronically signed by Summer Lipscomb RN on 11/19/2020 at 8:09 PM

## 2020-12-02 NOTE — PROGRESS NOTES
 Hypothyroidism     hypothyroidism    S/P cardiac cath     Nl LVEF and NL cors/ chronic LBBB now paced    Sleep apnea     pt does not use Cpap machine    Wears glasses      Past Surgical History:   Procedure Laterality Date    COLONOSCOPY  2016    hua'celina--elizabeth      ELBOW SURGERY Bilateral     Tennis elbow    HAMMER TOE SURGERY      x2    JOINT REPLACEMENT Left     Left Knee    KNEE ARTHROSCOPY      Rt and Left(2)    KNEE CARTILAGE SURGERY      Repair Left    LUMBAR LAMINECTOMY  (L2-3), (L4-5),     screws    TURP       Family History   Problem Relation Age of Onset    Breast Cancer Mother     Other Father         Peritonitis    Other Daughter         Postpartum Cardiomyopathy     Social History     Tobacco Use    Smoking status: Former Smoker     Packs/day: 0.25     Years: 10.00     Pack years: 2.50     Types: Cigarettes     Start date: 1960     Last attempt to quit: 1970     Years since quittin.9    Smokeless tobacco: Never Used    Tobacco comment: quit in    Substance Use Topics    Alcohol use:  Yes     Alcohol/week: 14.0 standard drinks     Types: 14 Cans of beer per week     Comment: has 20 ounces of beer every day    Drug use: No       Allergies   Allergen Reactions    Piroxicam Hives    Penicillins Rash and Hives     Current Outpatient Medications   Medication Sig Dispense Refill    lisinopril (PRINIVIL;ZESTRIL) 5 MG tablet Take 1 tablet by mouth daily 90 tablet 3    aspirin 81 MG chewable tablet Take 1 tablet by mouth daily 30 tablet 3    citalopram (CELEXA) 40 MG tablet TAKE 1 TABLET BY MOUTH EVERY MORNING (Patient taking differently: Take 40 mg by mouth nightly ) 90 tablet 0    atorvastatin (LIPITOR) 40 MG tablet TAKE 1 TABLET BY MOUTH AT BEDTIME 90 tablet 0    budesonide-formoterol (SYMBICORT) 160-4.5 MCG/ACT AERO Inhale 2 puffs into the lungs 2 times daily      tiotropium (SPIRIVA RESPIMAT) 2.5 MCG/ACT AERS inhaler Inhale 2 puffs into the lungs daily      albuterol sulfate  (90 Base) MCG/ACT inhaler Inhale 2 puffs into the lungs every 6 hours as needed      levothyroxine (SYNTHROID) 50 MCG tablet TAKE 1 TABLET BY MOUTH DAILY (Patient taking differently: Take 50 mcg by mouth nightly ) 90 tablet 3    carvedilol (COREG) 12.5 MG tablet TAKE 1 TABLET BY MOUTH TWICE DAILY WITH MEALS 180 tablet 3    amitriptyline (ELAVIL) 25 MG tablet TAKE 1 TABLET BY MOUTH EVERY NIGHT AT BEDTIME 90 tablet 5     No current facility-administered medications for this visit. Review of Systems:  · Constitutional: No unanticipated weight loss. There's been no change in energy level, sleep pattern, or activity level. No fevers, chills. · Eyes: No visual changes or diplopia. No scleral icterus. · ENT: No Headaches, hearing loss or vertigo. No mouth sores or sore throat. · Cardiovascular: as reviewed in HPI  · Respiratory: No cough or wheezing, no sputum production. No hemoptysis. · Gastrointestinal: No abdominal pain, appetite loss, blood in stools. No change in bowel or bladder habits. · Genitourinary: No dysuria, trouble voiding, or hematuria. · Musculoskeletal:  No gait disturbance, no joint complaints. · Integumentary: No rash or pruritis. · Neurological: No headache, diplopia, change in muscle strength, numbness or tingling. · Psychiatric: No anxiety or depression. · Endocrine: No temperature intolerance. No excessive thirst, fluid intake, or urination. No tremor. · Hematologic/Lymphatic: No abnormal bruising or bleeding, blood clots or swollen lymph nodes. · Allergic/Immunologic: No nasal congestion or hives.     Physical Exam:   /60   Pulse 60   Temp 96.9 °F (36.1 °C)   Ht 5' 10\" (1.778 m)   Wt 209 lb (94.8 kg)   SpO2 96%   BMI 29.99 kg/m²   Wt Readings from Last 3 Encounters:   12/04/20 209 lb (94.8 kg)   11/25/20 212 lb (96.2 kg)   11/20/20 209 lb 7 oz (95 kg)     Constitutional: He is oriented to person, place, and time. He appears well-developed and well-nourished. In no acute distress. Head: Normocephalic and atraumatic. Pupils equal and round. Neck: Neck supple. No JVP or carotid bruit appreciated. No mass and no thyromegaly present. No lymphadenopathy present. Cardiovascular: Normal rate. Normal heart sounds. Exam reveals no gallop and no friction rub. No murmur heard. Pulmonary/Chest: Effort normal and breath sounds normal. No respiratory distress. He has no wheezes, rhonchi or rales. Abdominal: Soft, non-tender. Bowel sounds are normal. He exhibits no organomegaly, mass or bruit. Extremities: No edema. No cyanosis or clubbing. Pulses are 2+ radial/carotid bilaterally. Neurological: No gross cranial nerve deficit. Coordination normal.   Skin: Skin is warm and dry. There is no rash or diaphoresis. Psychiatric: He has a normal mood and affect. His speech is normal and behavior is normal.     Lab Review:   FLP:    Lab Results   Component Value Date    TRIG 145 2020    HDL 52 2020    HDL 56 2012    LDLCALC 74 2020    LABVLDL 29 2020     BUN/Creatinine:    Lab Results   Component Value Date    BUN 19 2020    CREATININE 1.1 2020     EKG Interpretation: 2020.  Electronic ventricular pacemaker. Image Review:     Echo: 20  There is a pacemaker lead in the right ventricle. Overall left ventricular ejection fraction is estimated to be 50-55%. The left ventricular function is low normal.   Paradoxical septal motion secondary to paced rhythm. There is trivial mitral and aortic regurgitation.     Stress Test: 2020  The LV EF is 69%  Normal global and regional wall motion in all territories.   There is a medium size, fixed defect in the apical wall consistent with soft tissue attenuation.     Cath: 2009  LEFT MAIN: Normal                 LEFT ANTERIOR DESCENDIN% mid           LEFT CIRCUMFLEX: Normal                 RIGHT CORONARY:  Normal                  Assessment/Plan:   1) Chronic systolic heart failure s/p BiV ICD. EF 50-55%. Patient appears euvolemic and dyspnea has improved. Device interrogated today showed normal function. Continue carvedilol, lisinopril, and Aldactone. Will repeat BMP since restarting ACE-I.      2) Essential hypertension. Controlled. Goal BP <130/80. Continue medical therapy.       3) CAD. Asymptomatic. Continue with medical management and risk factor modification including aspirin, statin, and B-blocker.      4) Nonsustained ventricular tachycardia. Patient protected with ICD. Continue beta-blocker. 5) COPD. Follows with Dr. Elizabeth Ovalles.       Follow up in 6 months     Thank you very much for allowing me to participate in the care of your patient. Please do not hesitate to contact me if you have any questions. Sincerely,  Fatoumata Perez. Jose Miguel Meza, [address] [place] [address], [address] [place] [address]  Ph: [phone]  Fax: [phone]    This note was scribed in the presence of Dr Jose Miguel Meza MD by Janet Shelton RN. Physician Attestation: The scribes documentation has been prepared under my direction and personally reviewed by me in its entirety. I confirm that the note above accurately reflects all work, treatment, procedures, and medical decision making performed by me. All portions of the note including but not limited to the chief complaint, history of present illness, physical exam, assessment and plan/medical decision making were personally reviewed, edited, and updated on the day of the visit.

## 2020-12-02 NOTE — PATIENT INSTRUCTIONS
Patient Education        Learning About Coronary Artery Disease (CAD)  What is coronary artery disease? Coronary artery disease (CAD) occurs when plaque builds up in the arteries that bring oxygen-rich blood to your heart. Plaque is a fatty substance made of cholesterol, calcium, and other substances in the blood. This process is called hardening of the arteries, or atherosclerosis. What happens when you have coronary artery disease? · Plaque may narrow the coronary arteries. Narrowed arteries cause poor blood flow. This can lead to angina symptoms such as chest pain or discomfort. If blood flow is completely blocked, you could have a heart attack. · You can slow CAD and reduce the risk of future problems by making changes in your lifestyle. These include quitting smoking and eating heart-healthy foods. · Treatments for CAD, along with changes in your lifestyle, can help you live a longer and healthier life. How can you prevent coronary artery disease? · Do not smoke. It may be the best thing you can do to prevent heart disease. If you need help quitting, talk to your doctor about stop-smoking programs and medicines. These can increase your chances of quitting for good. · Be active. Get at least 30 minutes of exercise on most days of the week. Walking is a good choice. You also may want to do other activities, such as running, swimming, cycling, or playing tennis or team sports. · Eat heart-healthy foods. Eat more fruits and vegetables and less foods that contain saturated and trans fats. Limit alcohol, sodium, and sweets. · Stay at a healthy weight. Lose weight if you need to. · Manage other health problems such as diabetes, high blood pressure, and high cholesterol. How is coronary artery disease treated? · Your doctor will suggest that you make lifestyle changes. For example, your doctor may ask you to eat healthy foods, quit smoking, lose extra weight, and be more active.   · You will have to take medicines. · Your doctor may suggest a procedure to open narrowed or blocked arteries. This is called angioplasty. Or your doctor may suggest using healthy blood vessels to create detours around narrowed or blocked arteries. This is called bypass surgery. Follow-up care is a key part of your treatment and safety. Be sure to make and go to all appointments, and call your doctor if you are having problems. It's also a good idea to know your test results and keep a list of the medicines you take. Where can you learn more? Go to https://WheelrightpeGini.Tiqets. org and sign in to your [employer] account. Enter [phone] in the iKnow[employer] box to learn more about \"Learning About Coronary Artery Disease (CAD). \"     If you do not have an account, please click on the \"Sign Up Now\" link. Current as of: December 16, 2019               Content Version: 12.6  © 6824-3664 [employer]. Care instructions adapted under license by [facility] ([facility]). If you have questions about a medical condition or this instruction, always ask your healthcare professional. [place] [address] any warranty or liability for your use of this information.

## 2020-12-04 ENCOUNTER — OFFICE VISIT (OUTPATIENT)
Dept: CARDIOLOGY CLINIC | Age: 80
End: 2020-12-04
Payer: MEDICARE

## 2020-12-04 ENCOUNTER — NURSE ONLY (OUTPATIENT)
Dept: CARDIOLOGY CLINIC | Age: 80
End: 2020-12-04
Payer: MEDICARE

## 2020-12-04 VITALS
BODY MASS INDEX: 29.92 KG/M2 | OXYGEN SATURATION: 96 % | TEMPERATURE: 96.9 F | SYSTOLIC BLOOD PRESSURE: 120 MMHG | HEART RATE: 60 BPM | DIASTOLIC BLOOD PRESSURE: 60 MMHG | HEIGHT: 70 IN | WEIGHT: 209 LBS

## 2020-12-04 DIAGNOSIS — I50.22 CHRONIC SYSTOLIC HEART FAILURE (HCC): ICD-10-CM

## 2020-12-04 LAB
ANION GAP SERPL CALCULATED.3IONS-SCNC: 10 MMOL/L (ref 3–16)
BUN BLDV-MCNC: 20 MG/DL (ref 7–20)
CALCIUM SERPL-MCNC: 9.6 MG/DL (ref 8.3–10.6)
CHLORIDE BLD-SCNC: 100 MMOL/L (ref 99–110)
CO2: 29 MMOL/L (ref 21–32)
CREAT SERPL-MCNC: 1.4 MG/DL (ref 0.8–1.3)
GFR AFRICAN AMERICAN: 59
GFR NON-AFRICAN AMERICAN: 49
GLUCOSE BLD-MCNC: 145 MG/DL (ref 70–99)
POTASSIUM SERPL-SCNC: 4.5 MMOL/L (ref 3.5–5.1)
SODIUM BLD-SCNC: 139 MMOL/L (ref 136–145)

## 2020-12-04 PROCEDURE — 93284 PRGRMG EVAL IMPLANTABLE DFB: CPT | Performed by: INTERNAL MEDICINE

## 2020-12-04 PROCEDURE — 4040F PNEUMOC VAC/ADMIN/RCVD: CPT | Performed by: INTERNAL MEDICINE

## 2020-12-04 PROCEDURE — G8427 DOCREV CUR MEDS BY ELIG CLIN: HCPCS | Performed by: INTERNAL MEDICINE

## 2020-12-04 PROCEDURE — G8484 FLU IMMUNIZE NO ADMIN: HCPCS | Performed by: INTERNAL MEDICINE

## 2020-12-04 PROCEDURE — 1036F TOBACCO NON-USER: CPT | Performed by: INTERNAL MEDICINE

## 2020-12-04 PROCEDURE — G8417 CALC BMI ABV UP PARAM F/U: HCPCS | Performed by: INTERNAL MEDICINE

## 2020-12-04 PROCEDURE — 1111F DSCHRG MED/CURRENT MED MERGE: CPT | Performed by: INTERNAL MEDICINE

## 2020-12-04 PROCEDURE — 99214 OFFICE O/P EST MOD 30 MIN: CPT | Performed by: INTERNAL MEDICINE

## 2020-12-04 PROCEDURE — 1123F ACP DISCUSS/DSCN MKR DOCD: CPT | Performed by: INTERNAL MEDICINE

## 2020-12-04 NOTE — PROGRESS NOTES
Pt seen in clinic today for cardiac device interrogation. Their device is a BoSc 3 chamber BiV ICD  Based on threshold, impedance, and intrinsic sensing tests run today, the device appears to be functioning normally. Remaining battery life is 8y  AP 71%                 CRTP 100%      Pt with 1 ep \"NSVT\" -  appears to be Atach with RVR  4 PMTs - slower atach near max track rate. see PDF for EGMS    Rx:  carvedilol (COREG) 12.5 MG tablet TAKE 1 TABLET BY MOUTH TWICE DAILY WITH MEALS     Pt was informed of findings today and general questions have been answered with regard to device. Home monitoring permission to transfer will be requested today. Results discussed with or to be reviewed by Dr. Natacha Khanna    Pt to see Dr. Melinda Dean in clinic today following their device check.

## 2020-12-07 ENCOUNTER — TELEPHONE (OUTPATIENT)
Dept: CARDIOLOGY CLINIC | Age: 80
End: 2020-12-07

## 2020-12-07 NOTE — TELEPHONE ENCOUNTER
Tiny Duke called in this morning returning a call from Erick Mccann MA about his BMP results.      Tiny Duke can be reached at [phone]

## 2021-01-05 ENCOUNTER — OFFICE VISIT (OUTPATIENT)
Dept: PULMONOLOGY | Age: 81
End: 2021-01-05
Payer: MEDICARE

## 2021-01-05 VITALS
HEART RATE: 56 BPM | SYSTOLIC BLOOD PRESSURE: 110 MMHG | HEIGHT: 70 IN | OXYGEN SATURATION: 97 % | WEIGHT: 211.4 LBS | TEMPERATURE: 97.1 F | RESPIRATION RATE: 20 BRPM | BODY MASS INDEX: 30.26 KG/M2 | DIASTOLIC BLOOD PRESSURE: 65 MMHG

## 2021-01-05 DIAGNOSIS — J45.909 UNCOMPLICATED ASTHMA, UNSPECIFIED ASTHMA SEVERITY, UNSPECIFIED WHETHER PERSISTENT: ICD-10-CM

## 2021-01-05 DIAGNOSIS — G47.34 NOCTURNAL HYPOXIA: ICD-10-CM

## 2021-01-05 DIAGNOSIS — R06.02 SOB (SHORTNESS OF BREATH): Primary | ICD-10-CM

## 2021-01-05 PROCEDURE — 4040F PNEUMOC VAC/ADMIN/RCVD: CPT | Performed by: INTERNAL MEDICINE

## 2021-01-05 PROCEDURE — G8417 CALC BMI ABV UP PARAM F/U: HCPCS | Performed by: INTERNAL MEDICINE

## 2021-01-05 PROCEDURE — 1123F ACP DISCUSS/DSCN MKR DOCD: CPT | Performed by: INTERNAL MEDICINE

## 2021-01-05 PROCEDURE — 1036F TOBACCO NON-USER: CPT | Performed by: INTERNAL MEDICINE

## 2021-01-05 PROCEDURE — G8482 FLU IMMUNIZE ORDER/ADMIN: HCPCS | Performed by: INTERNAL MEDICINE

## 2021-01-05 PROCEDURE — G8427 DOCREV CUR MEDS BY ELIG CLIN: HCPCS | Performed by: INTERNAL MEDICINE

## 2021-01-05 PROCEDURE — 99214 OFFICE O/P EST MOD 30 MIN: CPT | Performed by: INTERNAL MEDICINE

## 2021-01-05 NOTE — PATIENT INSTRUCTIONS
Start theophylline once a day     Continue with inhalers     Use albuterol as needed    Blood work today     Follow up in 2 months

## 2021-01-05 NOTE — PROGRESS NOTES
Chief complaint  This is a 79y.o. year old male  who comes to see me with a chief complaint of   Chief Complaint   Patient presents with   [address] from Hospital       [facility]  Here with a cc of hospital follow up    He was recently admitted for SOB and chest discomfort. Symptoms had worsened over a short interval and inhalers/prednisone were not helping. He was briefly hypoxic but that did not persist at MD. He is on symbicort, spiriva and albuterol. Saw Pulm at outside facility last year and was diagnosed with chronic bronchitis. He does not quit fit the criteria of chronic bronchitis. Office notes stat his PFt showed obstruction with bronchodilator response but missing information otherwise. He is still SOB and albuterol use does not help. He does not wheeze and does not have mucus. He is on oxygen with sleep at 2 liters. He used to have MARILYNN with CPAP but did not think the machine did anything for him.   He is trying to get answers for his SOB      Past Medical History:   Diagnosis Date    AICD (automatic implantable cardioverter defibrillator) at end of life     pt instructed to bring ID card--hx of LBBB     Anxiety and depression     Arthritis     Back pain     CHF (congestive heart failure) (HCC)     GERD (gastroesophageal reflux disease)     History of blood transfusion 2005    Hyperlipidemia     Hypertension     Hypothyroidism     hypothyroidism    S/P cardiac cath 6-2009    Nl LVEF and NL cors/ chronic LBBB now paced    Sleep apnea     pt does not use Cpap machine    Wears glasses        Past Surgical History:   Procedure Laterality Date    COLONOSCOPY  05/11/2016    sophia--elizabeth 2021     ELBOW SURGERY Bilateral     Tennis elbow    HAMMER TOE SURGERY      x2    JOINT REPLACEMENT Left     Left Knee    KNEE ARTHROSCOPY      Rt and Left(2)    KNEE CARTILAGE SURGERY  8-2008    Repair Left    LUMBAR LAMINECTOMY  9-2007(L2-3), (L4-5), 2015    screws    TURP Social History     Socioeconomic History    Marital status:      Spouse name: Not on file    Number of children: 3    Years of education: Not on file    Highest education level: Bachelor's degree (e.g., BA, AB, BS)   Occupational History    Occupation: Retired    Social Needs    Financial resource strain: Not hard at all   Vannessa-Rajeev insecurity     Worry: Never true     Inability: Never true   [employer] needs     Medical: No     Non-medical: No   Tobacco Use    Smoking status: Former Smoker     Packs/day: 0.25     Years: 10.00     Pack years: 2.50     Types: Cigarettes     Start date: 1960     Quit date: 1970     Years since quittin.0    Smokeless tobacco: Never Used    Tobacco comment: quit in    Substance and Sexual Activity    Alcohol use:  Yes     Alcohol/week: 14.0 standard drinks     Types: 14 Cans of beer per week     Comment: has 20 ounces of beer every day    Drug use: No    Sexual activity: Not Currently   Lifestyle    Physical activity     Days per week: Not on file     Minutes per session: Not on file    Stress: Not on file   Relationships    Social connections     Talks on phone: Not on file     Gets together: Not on file     Attends [employer] service: Not on file     Active member of club or organization: Not on file     Attends meetings of clubs or organizations: Not on file     Relationship status: Not on file    Intimate partner violence     Fear of current or ex partner: Not on file     Emotionally abused: Not on file     Physically abused: Not on file     Forced sexual activity: Not on file   Other Topics Concern    Not on file   Social History Narrative    Not on file       Family History   Problem Relation Age of Onset    Breast Cancer Mother     Other Father         Peritonitis    Other Daughter         Postpartum Cardiomyopathy         Current Outpatient Medications:     theophylline (AUGUSTINE-24) 300 MG extended release capsule, Take 1 capsule by distress  HEENT:  No scleral icterus, no conjunctival irritation. Mallampati IV  NECK:  No thyromegaly, no bruits  LYMPH:  No cervical or supraclavicular adenopathy  HEART:  Regular rate and rhythm, no murmurs  LUNGS:  Clear but diminished   ABDOMEN:  No distention, no organomegaly  EXTREMITIES:  No edema, no digital clubbing  NEURO:  No localizing deficits, CN II-XII intact    Pulmonary Function Testing Completed at outside hospital  Overall outside pulm notes suggest severe obstruction with bronchodilator response but missing a lot of information     Chest imaging from 11/2020 is reviewed. My interpretation is mild ground glass      ECHO  11/2020  The left atrium is mildly dilated. Mildly dilated right ventricle. Pacer / ICD wire is visualized in the right ventricle. The right atrium is mildly dilated. Pacemaker / ICD lead is visualized in the right atrium. IVC size is normal (<2.1 cm) but collapses < 50% with respiration consistent   with elevated RA pressure (8 mmHg). Unable to estimate pulmonary artery pressure secondary to incomplete TR jet   envelope. Lab Results   Component Value Date    WBC 9.6 11/20/2020    HGB 14.4 11/20/2020    HCT 43.9 11/20/2020    MCV 93.3 11/20/2020     11/20/2020       No results found for: BNP    Lab Results   Component Value Date    CREATININE 1.4 (H) 12/04/2020    BUN 20 12/04/2020     12/04/2020    K 4.5 12/04/2020     12/04/2020    CO2 29 12/04/2020       I reviewed above labs and studies pertinent to this visit and date    Assessment/Plan:  1. SOB (shortness of breath)  This is not clear to me at this time. Sure there is reports of obstruction on outside records but inhalers are not doing anything for him. ECHO looks ok but with dilated LA I would suspect some degree of left sided disease (not saying this is all heart). His RV was dilated but functional.  He is to continue with his inhalers.   I will try to get PFTs from [facility] so I can review them completely. Office notes are missing information I need to assess his performance     2. Nocturnal hypoxia  Uses 2 liters of oxygen with sleep. Does sleep better with this oxygen. Failed CPAP in the past    3. Uncomplicated asthma, unspecified asthma severity, unspecified whether persistent  I suspect this is a component of things. Will start theophylline and continue with inhalers. Next step would be pulmonary rehab and weight loss. May need RHC in the future but will try to keep it simple for now. - theophylline (AUGUSTINE-24) 300 MG extended release capsule; Take 1 capsule by mouth daily  Dispense: 30 capsule; Refill: 5  - Respiratory allergen profile;  Future    Follow up in 2 months

## 2021-01-05 NOTE — Clinical Note
Dr. Aden Gallegos was in for follow up. Still SOB despite inhalers. His old pulm notes were reviewed and I am trying to get the PFT report as I am not able to view it. Pulm note says obstruction with bronchodilator response. I started theophylline today. I will see him back in 2 months.      Thanks  Pete Salazar

## 2021-01-11 LAB
2000687N OAK TREE IGE: 0.58 KU/L (ref 0–0.34)
ALLERGEN BERMUDA GRASS IGE: 1.12 KU/L (ref 0–0.34)
ALLERGEN BIRCH IGE: 0.29 KU/L (ref 0–0.34)
ALLERGEN DOG DANDER IGE: <0.1 KU/L (ref 0–0.34)
ALLERGEN GERMAN COCKROACH IGE: 1.29 KU/L (ref 0–0.34)
ALLERGEN HORMODENDRUM IGE: <0.1 KUL/L (ref 0–0.34)
ALLERGEN MOUSE EPITHELIA IGE: <0.1 KU/L (ref 0–0.34)
ALLERGEN PECAN TREE IGE: 0.45 KU/L (ref 0–0.34)
ALLERGEN PIGWEED ROUGH IGE: 0.63 KU/L (ref 0–0.34)
ALLERGEN SHEEP SORREL (W18) IGE: 0.86 KU/L (ref 0–0.34)
ALLERGEN TREE SYCAMORE: 0.58 KU/L (ref 0–0.34)
ALLERGEN WALNUT TREE IGE: 0.6 KU/L (ref 0–0.34)
ALLERGEN WHITE MULBERRY TREE, IGE: 0.38 KU/L (ref 0–0.34)
ALLERGEN, TREE, WHITE ASH IGE: 0.83 KU/L (ref 0–0.34)
ALTERNARIA ALTERNATA: <0.1 KU/L (ref 0–0.34)
ASPERGILLUS FUMIGATUS: <0.1 KU/L (ref 0–0.34)
CAT DANDER ANTIBODY: <0.1 KU/L (ref 0–0.34)
COTTONWOOD TREE: 0.45 KU/L (ref 0–0.34)
D. FARINAE: 2.63 KU/L (ref 0–0.34)
D. PTERONYSSINUS: 1.65 KU/L (ref 0–0.34)
ELM TREE: 0.63 KU/L (ref 0–0.34)
IGE: 376 IU/ML
MAPLE/BOXELDER TREE: 0.64 KU/L (ref 0–0.34)
MOUNTAIN CEDAR TREE: 0.34 KU/L (ref 0–0.34)
MUCOR RACEMOSUS: 0.51 KU/L (ref 0–0.34)
P. NOTATUM: <0.1 KU/L (ref 0–0.34)
RUSSIAN THISTLE: 1.41 KU/L (ref 0–0.34)
SHORT RAGWD(A ARTEMIS.) IGE: 2.25 KU/L (ref 0–0.34)
TIMOTHY GRASS: 4.75 KU/L (ref 0–0.34)

## 2021-02-02 RX ORDER — ATORVASTATIN CALCIUM 40 MG/1
TABLET, FILM COATED ORAL
Qty: 90 TABLET | Refills: 0 | Status: SHIPPED | OUTPATIENT
Start: 2021-02-02 | End: 2021-04-29

## 2021-02-02 RX ORDER — CARVEDILOL 12.5 MG/1
TABLET ORAL
Qty: 180 TABLET | Refills: 3 | Status: SHIPPED | OUTPATIENT
Start: 2021-02-02 | End: 2022-01-19

## 2021-02-02 RX ORDER — CITALOPRAM 40 MG/1
40 TABLET ORAL NIGHTLY
Qty: 90 TABLET | Refills: 2 | Status: SHIPPED | OUTPATIENT
Start: 2021-02-02 | End: 2021-10-21

## 2021-02-02 RX ORDER — AMITRIPTYLINE HYDROCHLORIDE 25 MG/1
TABLET, FILM COATED ORAL
Qty: 90 TABLET | Refills: 5 | Status: SHIPPED | OUTPATIENT
Start: 2021-02-02 | End: 2022-04-13

## 2021-02-09 NOTE — PROGRESS NOTES
Subjective:      Patient ID: Melissa Gomez is a 69 y.o. male. HPI  1. Pure hypercholesterolemia --Stable--no new issues----lab noted and reviewed      2. Hypothyroidism, unspecified type --Stable--no new issues----lab noted and reviewed      3. Chronic systolic congestive heart failure ([facility] [address] --Stable--no new issues  --sees dr Odette macias--AICD--battery change in 2 years     NO med changes   Fall 1 mos ago-- fx scapulae and elbow stitches -saw dr Valerie Falk LBP--limits activity--sees dr Ozzy Robles ----------------  Is fasting for labs   Had a flu shot --UTD on other immunizations   Sees dr Belle Wright --q 12 mos --all OK --he does psa --elizabeth q 12 mos --   Review of Systems   Respiratory: Negative for shortness of breath. Cardiovascular: Negative for chest pain. Gastrointestinal: Negative for abdominal pain. Objective:   Physical Exam   HENT:   Head: Normocephalic. Eyes: Pupils are equal, round, and reactive to light. Neck: Normal range of motion. Cardiovascular: Normal rate and normal heart sounds. Pulmonary/Chest: Effort normal.   Abdominal: Soft. Musculoskeletal: He exhibits no edema. decr rom l/s spine     No radic sx    Neurological: He is alert. Assessment:      1. Pure hypercholesterolemia --Continue current therapy  --ck labs     2. Hypothyroidism, unspecified type --Continue current therapy      3. Chronic systolic congestive heart failure [facility]) --sees dr Cesilia Gallardo     4.  Blood glucose abnormal ---ck A1c     Recc warm water exercises   rto 6mos --ext ov       Plan: Ivermectin Counseling:  Patient instructed to take medication on an empty stomach with a full glass of water.  Patient informed of potential adverse effects including but not limited to nausea, diarrhea, dizziness, itching, and swelling of the extremities or lymph nodes.  The patient verbalized understanding of the proper use and possible adverse effects of ivermectin.  All of the patient's questions and concerns were addressed.

## 2021-02-23 DIAGNOSIS — Z00.00 PREVENTATIVE HEALTH CARE: ICD-10-CM

## 2021-02-23 DIAGNOSIS — N40.0 BENIGN PROSTATIC HYPERPLASIA WITHOUT LOWER URINARY TRACT SYMPTOMS: ICD-10-CM

## 2021-02-23 DIAGNOSIS — E03.9 HYPOTHYROIDISM, UNSPECIFIED TYPE: ICD-10-CM

## 2021-02-23 DIAGNOSIS — E78.00 PURE HYPERCHOLESTEROLEMIA: ICD-10-CM

## 2021-02-24 LAB
A/G RATIO: 1.4 (ref 1.1–2.2)
ALBUMIN SERPL-MCNC: 4.1 G/DL (ref 3.4–5)
ALP BLD-CCNC: 108 U/L (ref 40–129)
ALT SERPL-CCNC: 26 U/L (ref 10–40)
ANION GAP SERPL CALCULATED.3IONS-SCNC: 10 MMOL/L (ref 3–16)
AST SERPL-CCNC: 25 U/L (ref 15–37)
BILIRUB SERPL-MCNC: 0.8 MG/DL (ref 0–1)
BUN BLDV-MCNC: 15 MG/DL (ref 7–20)
CALCIUM SERPL-MCNC: 9.3 MG/DL (ref 8.3–10.6)
CHLORIDE BLD-SCNC: 103 MMOL/L (ref 99–110)
CHOLESTEROL, TOTAL: 155 MG/DL (ref 0–199)
CO2: 28 MMOL/L (ref 21–32)
CREAT SERPL-MCNC: 1.3 MG/DL (ref 0.8–1.3)
GFR AFRICAN AMERICAN: >60
GFR NON-AFRICAN AMERICAN: 53
GLOBULIN: 2.9 G/DL
GLUCOSE BLD-MCNC: 73 MG/DL (ref 70–99)
HDLC SERPL-MCNC: 48 MG/DL (ref 40–60)
HEPATITIS C ANTIBODY INTERPRETATION: NORMAL
LDL CHOLESTEROL CALCULATED: 75 MG/DL
POTASSIUM SERPL-SCNC: 3.9 MMOL/L (ref 3.5–5.1)
PROSTATE SPECIFIC ANTIGEN: 1.82 NG/ML (ref 0–4)
SODIUM BLD-SCNC: 141 MMOL/L (ref 136–145)
T4 FREE: 1.1 NG/DL (ref 0.9–1.8)
TOTAL PROTEIN: 7 G/DL (ref 6.4–8.2)
TRIGL SERPL-MCNC: 160 MG/DL (ref 0–150)
TSH SERPL DL<=0.05 MIU/L-ACNC: 3.79 UIU/ML (ref 0.27–4.2)
VLDLC SERPL CALC-MCNC: 32 MG/DL

## 2021-03-04 ENCOUNTER — NURSE ONLY (OUTPATIENT)
Dept: CARDIOLOGY CLINIC | Age: 81
End: 2021-03-04
Payer: MEDICARE

## 2021-03-04 DIAGNOSIS — I47.29 NSVT (NONSUSTAINED VENTRICULAR TACHYCARDIA): ICD-10-CM

## 2021-03-04 DIAGNOSIS — Z95.810 ICD (IMPLANTABLE CARDIOVERTER-DEFIBRILLATOR) IN PLACE: Primary | ICD-10-CM

## 2021-03-04 PROCEDURE — 93295 DEV INTERROG REMOTE 1/2/MLT: CPT | Performed by: INTERNAL MEDICINE

## 2021-03-04 PROCEDURE — 93296 REM INTERROG EVL PM/IDS: CPT | Performed by: INTERNAL MEDICINE

## 2021-03-04 NOTE — LETTER
[facility] [phone]  [address] [phone]    Pacemaker/Defibrillator Clinic          03/04/21        Matthew [address] [facility] [address] Teressa Richards    This letter is to inform you that we received the transmission from your monitor at home that checks your implanted heart device. The next date your monitor will automatically transmit will be 6-14-21. If your report needs attention we will notify you. Your device and monitor are wireless and most transmit cellularly, but please periodically check your monitor is still plugged in to the electrical outlet. If you still use the telephone land line to send please ensure the connection to the phone omar is secure. This will help to ensure successful automatic transmissions in the future. Also, the monitor needs to be close to you while sleeping at night. Please be aware that the remote device transmission sites are periodically monitored only during regular business hours during which simultaneous in-office device clinics are being run. If your transmission requires attention, we will contact you as soon as possible. Thank you.             Ronn 82

## 2021-03-09 ENCOUNTER — OFFICE VISIT (OUTPATIENT)
Dept: PULMONOLOGY | Age: 81
End: 2021-03-09
Payer: MEDICARE

## 2021-03-09 VITALS
HEART RATE: 63 BPM | SYSTOLIC BLOOD PRESSURE: 112 MMHG | OXYGEN SATURATION: 93 % | RESPIRATION RATE: 16 BRPM | WEIGHT: 209 LBS | BODY MASS INDEX: 29.92 KG/M2 | TEMPERATURE: 97.7 F | DIASTOLIC BLOOD PRESSURE: 72 MMHG | HEIGHT: 70 IN

## 2021-03-09 DIAGNOSIS — J45.909 SEVERE ASTHMA WITHOUT COMPLICATION, UNSPECIFIED WHETHER PERSISTENT: Primary | ICD-10-CM

## 2021-03-09 DIAGNOSIS — G47.34 NOCTURNAL HYPOXIA: ICD-10-CM

## 2021-03-09 DIAGNOSIS — R76.8 ELEVATED IGE LEVEL: ICD-10-CM

## 2021-03-09 DIAGNOSIS — J45.909 SEVERE ASTHMA WITHOUT COMPLICATION, UNSPECIFIED WHETHER PERSISTENT: ICD-10-CM

## 2021-03-09 PROCEDURE — 1036F TOBACCO NON-USER: CPT | Performed by: INTERNAL MEDICINE

## 2021-03-09 PROCEDURE — G8417 CALC BMI ABV UP PARAM F/U: HCPCS | Performed by: INTERNAL MEDICINE

## 2021-03-09 PROCEDURE — G8427 DOCREV CUR MEDS BY ELIG CLIN: HCPCS | Performed by: INTERNAL MEDICINE

## 2021-03-09 PROCEDURE — 1123F ACP DISCUSS/DSCN MKR DOCD: CPT | Performed by: INTERNAL MEDICINE

## 2021-03-09 PROCEDURE — 99214 OFFICE O/P EST MOD 30 MIN: CPT | Performed by: INTERNAL MEDICINE

## 2021-03-09 PROCEDURE — 4040F PNEUMOC VAC/ADMIN/RCVD: CPT | Performed by: INTERNAL MEDICINE

## 2021-03-09 PROCEDURE — G8482 FLU IMMUNIZE ORDER/ADMIN: HCPCS | Performed by: INTERNAL MEDICINE

## 2021-03-09 RX ORDER — MONTELUKAST SODIUM 10 MG/1
10 TABLET ORAL DAILY
Qty: 30 TABLET | Refills: 5 | Status: SHIPPED | OUTPATIENT
Start: 2021-03-09 | End: 2021-03-09

## 2021-03-09 RX ORDER — MONTELUKAST SODIUM 10 MG/1
10 TABLET ORAL DAILY
Qty: 90 TABLET | Refills: 1 | Status: SHIPPED | OUTPATIENT
Start: 2021-03-09 | End: 2021-11-08

## 2021-03-09 ASSESSMENT — ASTHMA QUESTIONNAIRES
ACT_TOTALSCORE: 16
QUESTION_3 LAST FOUR WEEKS HOW OFTEN DID YOUR ASTHMA SYMPTOMS (WHEEZING, COUGHING, SHORTNESS OF BREATH, CHEST TIGHTNESS OR PAIN) WAKE YOU UP AT NIGHT OR EARLIER THAN USUAL IN THE MORNING: 4

## 2021-03-09 NOTE — PROGRESS NOTES
 Transportation needs     Medical: No     Non-medical: No   Tobacco Use    Smoking status: Former Smoker     Packs/day: 0.25     Years: 10.00     Pack years: 2.50     Types: Cigarettes     Start date: 1960     Quit date: 1970     Years since quittin.2    Smokeless tobacco: Never Used    Tobacco comment: quit in    Substance and Sexual Activity    Alcohol use:  Yes     Alcohol/week: 7.0 standard drinks     Types: 7 Cans of beer per week    Drug use: No    Sexual activity: Not Currently   Lifestyle    Physical activity     Days per week: Not on file     Minutes per session: Not on file    Stress: Not on file   Relationships    Social connections     Talks on phone: Not on file     Gets together: Not on file     Attends Sabianism service: Not on file     Active member of club or organization: Not on file     Attends meetings of clubs or organizations: Not on file     Relationship status: Not on file    Intimate partner violence     Fear of current or ex partner: Not on file     Emotionally abused: Not on file     Physically abused: Not on file     Forced sexual activity: Not on file   Other Topics Concern    Not on file   Social History Narrative    Not on file       Family History   Problem Relation Age of Onset    Breast Cancer Mother     Other Father         Peritonitis    Other Daughter         Postpartum Cardiomyopathy         Current Outpatient Medications:     montelukast (SINGULAIR) 10 MG tablet, Take 1 tablet by mouth daily, Disp: 30 tablet, Rfl: 5    amitriptyline (ELAVIL) 25 MG tablet, TAKE 1 TABLET BY MOUTH EVERY NIGHT AT BEDTIME, Disp: 90 tablet, Rfl: 5    citalopram (CELEXA) 40 MG tablet, Take 1 tablet by mouth nightly, Disp: 90 tablet, Rfl: 2    atorvastatin (LIPITOR) 40 MG tablet, TAKE 1 TABLET BY MOUTH AT BEDTIME, Disp: 90 tablet, Rfl: 0    carvedilol (COREG) 12.5 MG tablet, TAKE 1 TABLET BY MOUTH TWICE DAILY WITH MEALS, Disp: 180 tablet, Rfl: 3    theophylline (AUGUSTINE-24) 300 MG extended release capsule, Take 1 capsule by mouth daily, Disp: 30 capsule, Rfl: 5    lisinopril (PRINIVIL;ZESTRIL) 5 MG tablet, Take 1 tablet by mouth daily, Disp: 90 tablet, Rfl: 3    aspirin 81 MG chewable tablet, Take 1 tablet by mouth daily, Disp: 30 tablet, Rfl: 3    budesonide-formoterol (SYMBICORT) 160-4.5 MCG/ACT AERO, Inhale 2 puffs into the lungs 2 times daily, Disp: , Rfl:     tiotropium (SPIRIVA RESPIMAT) 2.5 MCG/ACT AERS inhaler, Inhale 2 puffs into the lungs daily, Disp: , Rfl:     albuterol sulfate  (90 Base) MCG/ACT inhaler, Inhale 2 puffs into the lungs every 6 hours as needed, Disp: , Rfl:     levothyroxine (SYNTHROID) 50 MCG tablet, TAKE 1 TABLET BY MOUTH DAILY (Patient taking differently: Take 50 mcg by mouth nightly ), Disp: 90 tablet, Rfl: 3      PHYSICAL EXAM:  Vitals:    03/09/21 0751   BP: 112/72   Pulse: 63   Resp: 16   Temp: 97.7 °F (36.5 °C)   SpO2: 93%       GENERAL:  Well nourished, alert, appears stated age, no distress  HEENT:  No scleral icterus, no conjunctival irritation. Mallampati IV  NECK:  No thyromegaly, no bruits  LYMPH:  No cervical or supraclavicular adenopathy  HEART:  Regular rate and rhythm, no murmurs  LUNGS:  Clear but diminished   ABDOMEN:  No distention, no organomegaly  EXTREMITIES:  No edema, no digital clubbing  NEURO:  No localizing deficits, CN II-XII intact    Pulmonary Function Testing Completed at outside hospital  Overall outside pulm notes suggest severe obstruction with bronchodilator response but missing a lot of information     Chest imaging from 11/2020 is reviewed. My interpretation is mild ground glass      ECHO  11/2020  The left atrium is mildly dilated. Mildly dilated right ventricle. Pacer / ICD wire is visualized in the right ventricle. The right atrium is mildly dilated. Pacemaker / ICD lead is visualized in the right atrium.    IVC size is normal (<2.1 cm) but collapses < 50% with respiration consistent with elevated RA pressure (8 mmHg). Unable to estimate pulmonary artery pressure secondary to incomplete TR jet   envelope. 1/21  Total IgE Level:  376  Significant allergies:  Multiple     Lab Results   Component Value Date    WBC 9.6 11/20/2020    HGB 14.4 11/20/2020    HCT 43.9 11/20/2020    MCV 93.3 11/20/2020     11/20/2020       No results found for: BNP    Lab Results   Component Value Date    CREATININE 1.3 02/23/2021    BUN 15 02/23/2021     02/23/2021    K 3.9 02/23/2021     02/23/2021    CO2 28 02/23/2021     ASTHMA CONTROL TEST 3/9/2021   In the past 4 weeks, how much of the time did your asthma keep you from getting as much done at work, school or at home? 4   During the past 4 weeks, how often have you had shortness of breath? 3   During the past 4 weeks, how often did your asthma symptoms (wheezing, coughing, shortness of breath, chest tightness or pain) wake you up at night or earlier than usual in the morning? 4   During the past 4 weeks, how often have you used your rescue inhaler or nebulizer medication (such as albuterol)? 2   How would you rate your asthma control during the past 4 weeks? 3   Asthma Control Test Total Score 16       I reviewed above labs and studies pertinent to this visit and date    Assessment/Plan:  1. Severe asthma without complication, unspecified whether persistent  Improved but still with symptoms. Will add on singulair to his theophylline and inhalers. Can still maximize his theophylline dosing in the future. Will need a level if we decide to increase his dose  - montelukast (SINGULAIR) 10 MG tablet; Take 1 tablet by mouth daily  Dispense: 30 tablet; Refill: 5    2. Nocturnal hypoxia  Uses 2 liters with sleep. Failed CPAP therapy in the past    3. Elevated IgE level  SIngulair at hs.    - montelukast (SINGULAIR) 10 MG tablet; Take 1 tablet by mouth daily  Dispense: 30 tablet;  Refill: 5      Follow up in 3 months

## 2021-04-29 RX ORDER — ATORVASTATIN CALCIUM 40 MG/1
TABLET, FILM COATED ORAL
Qty: 90 TABLET | Refills: 0 | Status: SHIPPED | OUTPATIENT
Start: 2021-04-29 | End: 2021-07-23

## 2021-04-29 RX ORDER — LEVOTHYROXINE SODIUM 0.05 MG/1
50 TABLET ORAL NIGHTLY
Qty: 90 TABLET | Refills: 3 | Status: SHIPPED | OUTPATIENT
Start: 2021-04-29 | End: 2022-04-19

## 2021-06-07 NOTE — PROGRESS NOTES
[facility]      Cardiology Consult    Roxy Shone  [birth year] June 8, 2021    Primary care physician: Janiya Malave MD  Reason for Referral: CHF    CC: \"short of breath a few times but no changes. \"     HPI:  The patient is 79 y.o. male with a past medical history significant for systolic heart failure s/p BiV ICD, hypertension, coronary artery disease, and COPD who presents today for management of heart failure. He presented to the hospital 11/17/20 with complaints of progressive shortness of breath and also having a sore throat and headaches. There was no initial obvious etiology of his shortness of breath but because his breathing appeared still labored in the emergency department he was admitted to the hospital. His troponin was normal. His proBNP was only 62. He has a history of a nonischemic cardiomyopathy s/p BiV ICD but his ejection fraction normalized. He previously followed with [facility] cardiology. The patient denies any adverse drug reactions to cardiovascular medicines but spironolactone and lisinopril were discontinued for unclear reasons. Today, he states overall he is doing well. He denies any new cardiac sounding complaints. He follows with Dr. Lowe Early for management of his COPD. He has chronic STANLEY but feels it is at baseline. He reports compliance with his medications and tolerating. Patient denies exertional chest pain/pressure, dyspnea at rest, worsening STANLEY, PND, orthopnea, palpitations, lightheadedness, weight changes, changes in LE edema, and syncope.      Past Medical History:   Diagnosis Date    AICD (automatic implantable cardioverter defibrillator) at end of life     pt instructed to bring ID card--hx of LBBB     Anxiety and depression     Arthritis     Back pain     CHF (congestive heart failure) (HCC)     GERD (gastroesophageal reflux disease)     History of blood transfusion 2005    Hyperlipidemia     Hypertension     Hypothyroidism hypothyroidism    S/P cardiac cath     Nl LVEF and NL cors/ chronic LBBB now paced    Sleep apnea     pt does not use Cpap machine    Wears glasses      Past Surgical History:   Procedure Laterality Date    COLONOSCOPY  2016    o'celina--elizabeth      ELBOW SURGERY Bilateral     Tennis elbow    HAMMER TOE SURGERY      x2    JOINT REPLACEMENT Left     Left Knee    KNEE ARTHROSCOPY      Rt and Left(2)    KNEE CARTILAGE SURGERY      Repair Left    LUMBAR LAMINECTOMY  (L2-3), (L4-5),     screws    TURP       Family History   Problem Relation Age of Onset    Breast Cancer Mother     Other Father         Peritonitis    Other Daughter         Postpartum Cardiomyopathy     Social History     Tobacco Use    Smoking status: Former Smoker     Packs/day: 0.25     Years: 10.00     Pack years: 2.50     Types: Cigarettes     Start date: 1960     Quit date: 1970     Years since quittin.4    Smokeless tobacco: Never Used    Tobacco comment: quit in    Vaping Use    Vaping Use: Never used   Substance Use Topics    Alcohol use:  Yes     Alcohol/week: 7.0 standard drinks     Types: 7 Cans of beer per week    Drug use: No     Allergies   Allergen Reactions    Piroxicam Hives    Penicillins Rash and Hives     Current Outpatient Medications   Medication Sig Dispense Refill    levothyroxine (SYNTHROID) 50 MCG tablet Take 1 tablet by mouth nightly 90 tablet 3    atorvastatin (LIPITOR) 40 MG tablet TAKE 1 TABLET BY MOUTH AT BEDTIME 90 tablet 0    lisinopril (PRINIVIL;ZESTRIL) 5 MG tablet TAKE 1 TABLET BY MOUTH DAILY 90 tablet 3    montelukast (SINGULAIR) 10 MG tablet Take 1 tablet by mouth daily 90 tablet 1    amitriptyline (ELAVIL) 25 MG tablet TAKE 1 TABLET BY MOUTH EVERY NIGHT AT BEDTIME 90 tablet 5    citalopram (CELEXA) 40 MG tablet Take 1 tablet by mouth nightly 90 tablet 2    carvedilol (COREG) 12.5 MG tablet TAKE 1 TABLET BY MOUTH TWICE DAILY WITH MEALS 180 tablet 3    theophylline (AUGUSTINE-24) 300 MG extended release capsule Take 1 capsule by mouth daily 30 capsule 5    aspirin 81 MG chewable tablet Take 1 tablet by mouth daily 30 tablet 3    budesonide-formoterol (SYMBICORT) 160-4.5 MCG/ACT AERO Inhale 2 puffs into the lungs 2 times daily      tiotropium (SPIRIVA RESPIMAT) 2.5 MCG/ACT AERS inhaler Inhale 2 puffs into the lungs daily      albuterol sulfate  (90 Base) MCG/ACT inhaler Inhale 2 puffs into the lungs every 6 hours as needed       No current facility-administered medications for this visit. Review of Systems:  · Constitutional: No unanticipated weight loss. There's been no change in energy level, sleep pattern, or activity level. No fevers, chills. · Eyes: No visual changes or diplopia. No scleral icterus. · ENT: No Headaches, hearing loss or vertigo. No mouth sores or sore throat. · Cardiovascular: as reviewed in HPI  · Respiratory: No cough or wheezing, no sputum production. No hemoptysis. · Gastrointestinal: No abdominal pain, appetite loss, blood in stools. No change in bowel or bladder habits. · Genitourinary: No dysuria, trouble voiding, or hematuria. · Musculoskeletal:  No gait disturbance, no joint complaints. · Integumentary: No rash or pruritis. · Neurological: No headache, diplopia, change in muscle strength, numbness or tingling. · Psychiatric: No anxiety or depression. · Endocrine: No temperature intolerance. No excessive thirst, fluid intake, or urination. No tremor. · Hematologic/Lymphatic: No abnormal bruising or bleeding, blood clots or swollen lymph nodes. · Allergic/Immunologic: No nasal congestion or hives.     Physical Exam:   /60   Pulse 66   Ht 5' 10\" (1.778 m)   Wt 207 lb (93.9 kg) Comment: with shoes  SpO2 96%   BMI 29.70 kg/m²   Wt Readings from Last 3 Encounters:   06/08/21 207 lb (93.9 kg)   03/09/21 209 lb (94.8 kg)   02/23/21 209 lb (94.8 kg)     Constitutional: He is oriented to person, place, and time. He appears well-developed and well-nourished. In no acute distress. Head: Normocephalic and atraumatic. Pupils equal and round. Neck: Neck supple. No JVP or carotid bruit appreciated. No mass and no thyromegaly present. No lymphadenopathy present. Cardiovascular: Normal rate. Normal heart sounds. Exam reveals no gallop and no friction rub. No murmur heard. Pulmonary/Chest: Effort normal and breath sounds normal. No respiratory distress. He has no wheezes, rhonchi or rales. Abdominal: Soft, non-tender. Bowel sounds are normal. He exhibits no organomegaly, mass or bruit. Extremities: No edema. No cyanosis or clubbing. Pulses are 2+ radial/carotid bilaterally. Neurological: No gross cranial nerve deficit. Coordination normal.   Skin: Skin is warm and dry. There is no rash or diaphoresis. Psychiatric: He has a normal mood and affect. His speech is normal and behavior is normal.     Lab Review:   FLP:    Lab Results   Component Value Date    TRIG 160 2021    HDL 48 2021    HDL 56 2012    LDLCALC 75 2021    LABVLDL 32 2021     BUN/Creatinine:    Lab Results   Component Value Date    BUN 15 2021    CREATININE 1.3 2021     EKG Interpretation: 2020.  Electronic ventricular pacemaker. Image Review:     Echo: 20  There is a pacemaker lead in the right ventricle. Overall left ventricular ejection fraction is estimated to be 50-55%. The left ventricular function is low normal.   Paradoxical septal motion secondary to paced rhythm. There is trivial mitral and aortic regurgitation.     Stress Test: 2020  The LV EF is 69%  Normal global and regional wall motion in all territories.   There is a medium size, fixed defect in the apical wall consistent with soft tissue attenuation.     Cath: 2009  LEFT MAIN: Normal                 LEFT ANTERIOR DESCENDIN% mid           LEFT CIRCUMFLEX: Normal                 RIGHT CORONARY:  Normal                  Assessment/Plan:   1) Chronic diastolic heart failure s/p BiV ICD. EF 50-55% (in 2020 from 30% in 2010). Patient appears euvolemic. Device interrogated today showed normal function. Continue carvedilol and lisinopril. Encouraged daily weights and low sodium diet.    2) Essential hypertension. Controlled. Goal BP <130/80. Continue medical therapy.       3) CAD. Asymptomatic. Continue with medical management and risk factor modification including aspirin, statin, and B-blocker.      4) Nonsustained ventricular tachycardia. Patient protected with ICD. Continue beta-blocker. 5) COPD. Follows with Dr. Davina Carlos.       Follow up in 6 months     Thank you very much for allowing me to participate in the care of your patient. Please do not hesitate to contact me if you have any questions. Sincerely,  Andree Jacobs. Aruna Peña, [address] [place] [address]  Ph: [phone]  Fax: [phone]    This note was scribed in the presence of Dr Aruna Peña MD by Willis Lopez, HENRY. Physician Attestation: The scribes documentation has been prepared under my direction and personally reviewed by me in its entirety. I confirm that the note above accurately reflects all work, treatment, procedures, and medical decision making performed by me. All portions of the note including but not limited to the chief complaint, history of present illness, physical exam, assessment and plan/medical decision making were personally reviewed, edited, and updated on the day of the visit.

## 2021-06-07 NOTE — PATIENT INSTRUCTIONS
Patient Education        Learning About Coronary Artery Disease (CAD)  What is coronary artery disease? Coronary artery disease is a condition that occurs when plaque builds up in the arteries that bring oxygen-rich blood to your heart. Plaque is a fatty substance made of cholesterol, calcium, and other substances in the blood. This process is called hardening of the arteries, or atherosclerosis. What happens when you have coronary artery disease? · Plaque may narrow the coronary arteries. Narrowed arteries cause poor blood flow. This can lead to angina symptoms such as chest pain or discomfort. If blood flow is completely blocked, you could have a heart attack. · You can slow and reduce the risk of future problems by making changes in your lifestyle. These include quitting smoking and eating heart-healthy foods. · Treatment, along with changes in your lifestyle, can help you live a longer and healthier life. How can you prevent coronary artery disease? · Do not smoke. It may be the best thing you can do to prevent coronary artery disease. If you need help quitting, talk to your doctor about stop-smoking programs and medicines. These can increase your chances of quitting for good. · Be active. Try to do moderate activity at least 2½ hours a week. Or try to do vigorous activity at least 1¼ hours a week. You may want to walk or try other activities, such as running, swimming, cycling, or playing tennis or team sports. · Eat heart-healthy foods. Eat more fruits and vegetables and less food that contains saturated and trans fats. Limit alcohol, sodium, and sweets. · Stay at a healthy weight. Lose weight if you need to. · Manage other health problems such as diabetes, high blood pressure, and high cholesterol. How is coronary artery disease treated? · Your doctor will suggest that you make lifestyle changes.  For example, your doctor may ask you to eat healthy foods, quit smoking, lose extra weight, and be more active. · You will take medicines that help prevent a heart attack. · Your doctor may suggest a procedure to open narrowed or blocked arteries. This is called angioplasty. Or your doctor may suggest using healthy blood vessels to create detours around narrowed or blocked arteries. This is called bypass surgery. Follow-up care is a key part of your treatment and safety. Be sure to make and go to all appointments, and call your doctor if you are having problems. It's also a good idea to know your test results and keep a list of the medicines you take. Where can you learn more? Go to [email or web address] and sign in to your [employer] account. Enter [phone] in the [employer] box to learn more about \"Learning About Coronary Artery Disease (CAD). \"     If you do not have an account, please click on the \"Sign Up Now\" link. Current as of: August 31, 2020               Content Version: 12.8  © 2006-2021 Healthwise, Incorporated. Care instructions adapted under license by [facility] ([facility]). If you have questions about a medical condition or this instruction, always ask your healthcare professional. [place] [address] any warranty or liability for your use of this information.

## 2021-06-08 ENCOUNTER — NURSE ONLY (OUTPATIENT)
Dept: CARDIOLOGY CLINIC | Age: 81
End: 2021-06-08
Payer: MEDICARE

## 2021-06-08 ENCOUNTER — OFFICE VISIT (OUTPATIENT)
Dept: CARDIOLOGY CLINIC | Age: 81
End: 2021-06-08
Payer: MEDICARE

## 2021-06-08 VITALS
SYSTOLIC BLOOD PRESSURE: 112 MMHG | HEART RATE: 66 BPM | DIASTOLIC BLOOD PRESSURE: 60 MMHG | BODY MASS INDEX: 29.63 KG/M2 | OXYGEN SATURATION: 96 % | WEIGHT: 207 LBS | HEIGHT: 70 IN

## 2021-06-08 DIAGNOSIS — I10 ESSENTIAL HYPERTENSION: ICD-10-CM

## 2021-06-08 DIAGNOSIS — I25.10 CORONARY ARTERY DISEASE INVOLVING NATIVE CORONARY ARTERY OF NATIVE HEART WITHOUT ANGINA PECTORIS: ICD-10-CM

## 2021-06-08 DIAGNOSIS — I44.7 LBBB (LEFT BUNDLE BRANCH BLOCK): ICD-10-CM

## 2021-06-08 DIAGNOSIS — Z95.810 PRESENCE OF BIVENTRICULAR AICD: ICD-10-CM

## 2021-06-08 DIAGNOSIS — I47.29 NSVT (NONSUSTAINED VENTRICULAR TACHYCARDIA): ICD-10-CM

## 2021-06-08 DIAGNOSIS — Z95.810 BIVENTRICULAR ICD (IMPLANTABLE CARDIOVERTER-DEFIBRILLATOR) IN PLACE: ICD-10-CM

## 2021-06-08 DIAGNOSIS — I50.22 CHRONIC SYSTOLIC HEART FAILURE (HCC): ICD-10-CM

## 2021-06-08 DIAGNOSIS — Z95.810 ICD (IMPLANTABLE CARDIOVERTER-DEFIBRILLATOR) IN PLACE: ICD-10-CM

## 2021-06-08 DIAGNOSIS — I50.22 CHRONIC SYSTOLIC HEART FAILURE (HCC): Primary | ICD-10-CM

## 2021-06-08 PROCEDURE — 93284 PRGRMG EVAL IMPLANTABLE DFB: CPT | Performed by: INTERNAL MEDICINE

## 2021-06-08 PROCEDURE — 4040F PNEUMOC VAC/ADMIN/RCVD: CPT | Performed by: INTERNAL MEDICINE

## 2021-06-08 PROCEDURE — G8417 CALC BMI ABV UP PARAM F/U: HCPCS | Performed by: INTERNAL MEDICINE

## 2021-06-08 PROCEDURE — G8427 DOCREV CUR MEDS BY ELIG CLIN: HCPCS | Performed by: INTERNAL MEDICINE

## 2021-06-08 PROCEDURE — 99214 OFFICE O/P EST MOD 30 MIN: CPT | Performed by: INTERNAL MEDICINE

## 2021-06-08 PROCEDURE — 1036F TOBACCO NON-USER: CPT | Performed by: INTERNAL MEDICINE

## 2021-06-08 PROCEDURE — 1123F ACP DISCUSS/DSCN MKR DOCD: CPT | Performed by: INTERNAL MEDICINE

## 2021-06-08 NOTE — PROGRESS NOTES
Pt seen in clinic today for cardiac device interrogation. Their device is a BoSc 3 chamber BiV CRTD  Based on threshold, impedance, and intrinsic sensing tests run today, the device appears to be functioning with minimal deviation from established trends  Remaining battery life is 8y  AP 61%        %         CRTP 100%      no new episodes since last remote check    Rx:  carvedilol (COREG) 12.5 MG tablet TAKE 1 TABLET BY MOUTH TWICE DAILY WITH MEALS     Pt was informed of findings today and general questions have been answered with regard to device. Home monitoring permission to transfer will be requested today. Results discussed with or to be reviewed by EP    Pt to see Dr. Jack El in clinic today following their device check.

## 2021-06-20 DIAGNOSIS — J45.909 UNCOMPLICATED ASTHMA, UNSPECIFIED ASTHMA SEVERITY, UNSPECIFIED WHETHER PERSISTENT: ICD-10-CM

## 2021-06-21 RX ORDER — THEOPHYLLINE ANHYDROUS 300 MG/1
CAPSULE, EXTENDED RELEASE ORAL
Qty: 30 CAPSULE | Refills: 5 | Status: SHIPPED | OUTPATIENT
Start: 2021-06-21 | End: 2021-12-22 | Stop reason: SDUPTHER

## 2021-06-23 ENCOUNTER — OFFICE VISIT (OUTPATIENT)
Dept: PULMONOLOGY | Age: 81
End: 2021-06-23
Payer: MEDICARE

## 2021-06-23 VITALS
HEIGHT: 70 IN | BODY MASS INDEX: 29.49 KG/M2 | TEMPERATURE: 96.9 F | HEART RATE: 60 BPM | RESPIRATION RATE: 16 BRPM | SYSTOLIC BLOOD PRESSURE: 124 MMHG | WEIGHT: 206 LBS | OXYGEN SATURATION: 95 % | DIASTOLIC BLOOD PRESSURE: 78 MMHG

## 2021-06-23 DIAGNOSIS — J45.909 SEVERE ASTHMA WITHOUT COMPLICATION, UNSPECIFIED WHETHER PERSISTENT: Primary | ICD-10-CM

## 2021-06-23 DIAGNOSIS — R76.8 ELEVATED IGE LEVEL: ICD-10-CM

## 2021-06-23 DIAGNOSIS — G47.34 NOCTURNAL HYPOXIA: ICD-10-CM

## 2021-06-23 PROCEDURE — 1036F TOBACCO NON-USER: CPT | Performed by: INTERNAL MEDICINE

## 2021-06-23 PROCEDURE — 99214 OFFICE O/P EST MOD 30 MIN: CPT | Performed by: INTERNAL MEDICINE

## 2021-06-23 PROCEDURE — 1123F ACP DISCUSS/DSCN MKR DOCD: CPT | Performed by: INTERNAL MEDICINE

## 2021-06-23 PROCEDURE — G8427 DOCREV CUR MEDS BY ELIG CLIN: HCPCS | Performed by: INTERNAL MEDICINE

## 2021-06-23 PROCEDURE — 4040F PNEUMOC VAC/ADMIN/RCVD: CPT | Performed by: INTERNAL MEDICINE

## 2021-06-23 PROCEDURE — G8417 CALC BMI ABV UP PARAM F/U: HCPCS | Performed by: INTERNAL MEDICINE

## 2021-06-23 ASSESSMENT — ASTHMA QUESTIONNAIRES
QUESTION_2 LAST FOUR WEEKS HOW OFTEN HAVE YOU HAD SHORTNESS OF BREATH: 3
QUESTION_1 LAST FOUR WEEKS HOW MUCH OF THE TIME DID YOUR ASTHMA KEEP YOU FROM GETTING AS MUCH DONE AT WORK, SCHOOL OR AT HOME: 5
QUESTION_3 LAST FOUR WEEKS HOW OFTEN DID YOUR ASTHMA SYMPTOMS (WHEEZING, COUGHING, SHORTNESS OF BREATH, CHEST TIGHTNESS OR PAIN) WAKE YOU UP AT NIGHT OR EARLIER THAN USUAL IN THE MORNING: 5
QUESTION_4 LAST FOUR WEEKS HOW OFTEN HAVE YOU USED YOUR RESCUE INHALER OR NEBULIZER MEDICATION (SUCH AS ALBUTEROL): 4
QUESTION_5 LAST FOUR WEEKS HOW WOULD YOU RATE YOUR ASTHMA CONTROL: 4
ACT_TOTALSCORE: 21

## 2021-07-08 ENCOUNTER — NURSE ONLY (OUTPATIENT)
Dept: CARDIOLOGY CLINIC | Age: 81
End: 2021-07-08
Payer: MEDICARE

## 2021-07-08 DIAGNOSIS — I50.22 CHRONIC SYSTOLIC HEART FAILURE (HCC): ICD-10-CM

## 2021-07-08 DIAGNOSIS — I47.29 NSVT (NONSUSTAINED VENTRICULAR TACHYCARDIA): ICD-10-CM

## 2021-07-08 DIAGNOSIS — I44.7 LBBB (LEFT BUNDLE BRANCH BLOCK): ICD-10-CM

## 2021-07-08 DIAGNOSIS — Z95.810 ICD (IMPLANTABLE CARDIOVERTER-DEFIBRILLATOR) IN PLACE: ICD-10-CM

## 2021-07-08 PROCEDURE — 93296 REM INTERROG EVL PM/IDS: CPT | Performed by: INTERNAL MEDICINE

## 2021-07-08 PROCEDURE — 93295 DEV INTERROG REMOTE 1/2/MLT: CPT | Performed by: INTERNAL MEDICINE

## 2021-07-08 NOTE — PROGRESS NOTES
We received remote transmission from patient's CRT-D monitor at home. Transmission shows normal sensing and pacing function. No new arrhythmias/events recorded. Ap 63%  RVp 100%  LVp 100%    EP physician will review. See interrogation under cardiology tab in the [address] [place] [address] [address] field for more details.

## 2021-07-08 NOTE — LETTER
[address] [facility] [phone]  [address] [phone]    Pacemaker/Defibrillator Clinic    07/08/21      Matthew [address] [facility] [address] Teressa Downs    This letter is to inform you that we received the transmission from your monitor at home that checks your implanted heart device. The next date your monitor will automatically transmit will be 10/7. If your report needs attention we will notify you. Your device and monitor are wireless and most transmit cellularly, but please periodically check your monitor is still plugged in to the electrical outlet. If you still use the telephone land line to send please ensure the connection to the phone omar is secure. This will help to ensure successful automatic transmissions in the future. Also, the monitor needs to be close to you while sleeping at night. Please be aware that the remote device transmission sites are periodically monitored only during regular business hours during which simultaneous in-office device clinics are being run. If your transmission requires attention, we will contact you as soon as possible. **PLEASE NOTE** that our [facility] policy and processes are changing to ensure a more seamless approach for all parties involved, allowing more time for our nurses to address patient issues and concerns. We will no longer be sending letters for NORMAL remote transmissions. You will be contacted by phone if your transmission requires attention (as previously done), and letters will only be sent regarding monitor disconnections or missed transmissions if you are unable to be reached by phone. Please do not be alarmed by this new process, as we will continue to contact you if your transmission report requires attention. This will be your final \"remote received\" letter.   From this point forward, the [facility] will be utilizing the no news is good news approach. As always, please feel free to contact your nurse with any questions or concerns. Thank you.     Ronn Tomlin

## 2021-07-23 RX ORDER — ATORVASTATIN CALCIUM 40 MG/1
TABLET, FILM COATED ORAL
Qty: 90 TABLET | Refills: 0 | Status: SHIPPED | OUTPATIENT
Start: 2021-07-23 | End: 2021-10-21

## 2021-07-27 RX ORDER — BUDESONIDE AND FORMOTEROL FUMARATE DIHYDRATE 160; 4.5 UG/1; UG/1
2 AEROSOL RESPIRATORY (INHALATION) 2 TIMES DAILY
Qty: 1 INHALER | Refills: 5 | Status: SHIPPED | OUTPATIENT
Start: 2021-07-27 | End: 2022-05-23 | Stop reason: SDUPTHER

## 2021-10-07 ENCOUNTER — NURSE ONLY (OUTPATIENT)
Dept: CARDIOLOGY CLINIC | Age: 81
End: 2021-10-07
Payer: MEDICARE

## 2021-10-07 DIAGNOSIS — I44.7 LBBB (LEFT BUNDLE BRANCH BLOCK): ICD-10-CM

## 2021-10-07 DIAGNOSIS — I50.22 CHRONIC SYSTOLIC HEART FAILURE (HCC): ICD-10-CM

## 2021-10-07 DIAGNOSIS — Z95.810 BIVENTRICULAR ICD (IMPLANTABLE CARDIOVERTER-DEFIBRILLATOR) IN PLACE: ICD-10-CM

## 2021-10-07 DIAGNOSIS — I47.29 NSVT (NONSUSTAINED VENTRICULAR TACHYCARDIA): ICD-10-CM

## 2021-10-07 PROCEDURE — 93295 DEV INTERROG REMOTE 1/2/MLT: CPT | Performed by: INTERNAL MEDICINE

## 2021-10-07 PROCEDURE — 93296 REM INTERROG EVL PM/IDS: CPT | Performed by: INTERNAL MEDICINE

## 2021-10-07 NOTE — PROGRESS NOTES
We received remote transmission from patient's CRT-D monitor at home. Transmission shows normal sensing and pacing function. Brief AT noted (Coreg). Ap 60%  RVp 100%  LVp 100%    EP physician will review. See interrogation under cardiology tab in the [address] [place] [address] [address] field for more details.

## 2021-10-21 RX ORDER — CITALOPRAM 40 MG/1
TABLET ORAL
Qty: 90 TABLET | Refills: 2 | Status: SHIPPED | OUTPATIENT
Start: 2021-10-21 | End: 2022-07-18

## 2021-10-21 RX ORDER — ATORVASTATIN CALCIUM 40 MG/1
TABLET, FILM COATED ORAL
Qty: 90 TABLET | Refills: 0 | Status: SHIPPED | OUTPATIENT
Start: 2021-10-21 | End: 2022-01-19

## 2021-11-08 ENCOUNTER — OFFICE VISIT (OUTPATIENT)
Dept: PULMONOLOGY | Age: 81
End: 2021-11-08
Payer: MEDICARE

## 2021-11-08 VITALS
OXYGEN SATURATION: 98 % | RESPIRATION RATE: 20 BRPM | DIASTOLIC BLOOD PRESSURE: 70 MMHG | HEART RATE: 62 BPM | HEIGHT: 70 IN | SYSTOLIC BLOOD PRESSURE: 115 MMHG | WEIGHT: 209.4 LBS | TEMPERATURE: 96.9 F | BODY MASS INDEX: 29.98 KG/M2

## 2021-11-08 DIAGNOSIS — G47.34 NOCTURNAL HYPOXIA: ICD-10-CM

## 2021-11-08 DIAGNOSIS — J45.909 SEVERE ASTHMA WITHOUT COMPLICATION, UNSPECIFIED WHETHER PERSISTENT: Primary | ICD-10-CM

## 2021-11-08 DIAGNOSIS — R76.8 ELEVATED IGE LEVEL: ICD-10-CM

## 2021-11-08 PROCEDURE — 4040F PNEUMOC VAC/ADMIN/RCVD: CPT | Performed by: INTERNAL MEDICINE

## 2021-11-08 PROCEDURE — G8417 CALC BMI ABV UP PARAM F/U: HCPCS | Performed by: INTERNAL MEDICINE

## 2021-11-08 PROCEDURE — G8427 DOCREV CUR MEDS BY ELIG CLIN: HCPCS | Performed by: INTERNAL MEDICINE

## 2021-11-08 PROCEDURE — 1036F TOBACCO NON-USER: CPT | Performed by: INTERNAL MEDICINE

## 2021-11-08 PROCEDURE — 99214 OFFICE O/P EST MOD 30 MIN: CPT | Performed by: INTERNAL MEDICINE

## 2021-11-08 PROCEDURE — G8484 FLU IMMUNIZE NO ADMIN: HCPCS | Performed by: INTERNAL MEDICINE

## 2021-11-08 PROCEDURE — 1123F ACP DISCUSS/DSCN MKR DOCD: CPT | Performed by: INTERNAL MEDICINE

## 2021-11-08 NOTE — PROGRESS NOTES
Chief complaint  This is a [de-identified]y.o. year old male  who comes to see me with a chief complaint of   Chief Complaint   Patient presents with    Follow-up     4m    Asthma       HPI  Here with a cc of asthma    He is doing well. ON symbicort, theophylline and albuterol. Not using albuterol too much and breathing is relatively controlled. Uses 2 liters of oxygen at night from a previous physician under the diagnosis of COPD. He is no longer using singulair due to lack of efficacy. He is fully vaccinated against COVID and flu shot is up to date        Current Outpatient Medications:     citalopram (CELEXA) 40 MG tablet, TAKE 1 TABLET BY MOUTH EVERY NIGHT, Disp: 90 tablet, Rfl: 2    atorvastatin (LIPITOR) 40 MG tablet, TAKE 1 TABLET BY MOUTH AT BEDTIME, Disp: 90 tablet, Rfl: 0    budesonide-formoterol (SYMBICORT) 160-4.5 MCG/ACT AERO, Inhale 2 puffs into the lungs 2 times daily, Disp: 1 Inhaler, Rfl: 5    AUGUSTINE-24 300 MG extended release capsule, TAKE 1 CAPSULE BY MOUTH DAILY, Disp: 30 capsule, Rfl: 5    levothyroxine (SYNTHROID) 50 MCG tablet, Take 1 tablet by mouth nightly, Disp: 90 tablet, Rfl: 3    lisinopril (PRINIVIL;ZESTRIL) 5 MG tablet, TAKE 1 TABLET BY MOUTH DAILY, Disp: 90 tablet, Rfl: 3    amitriptyline (ELAVIL) 25 MG tablet, TAKE 1 TABLET BY MOUTH EVERY NIGHT AT BEDTIME, Disp: 90 tablet, Rfl: 5    carvedilol (COREG) 12.5 MG tablet, TAKE 1 TABLET BY MOUTH TWICE DAILY WITH MEALS, Disp: 180 tablet, Rfl: 3    aspirin 81 MG chewable tablet, Take 1 tablet by mouth daily, Disp: 30 tablet, Rfl: 3    albuterol sulfate  (90 Base) MCG/ACT inhaler, Inhale 2 puffs into the lungs every 6 hours as needed, Disp: , Rfl:       PHYSICAL EXAM:  Vitals:    11/08/21 0742   BP: 115/70   Pulse: 62   Resp: 20   Temp: 96.9 °F (36.1 °C)   SpO2: 98%       GENERAL:  Well nourished, alert, appears stated age, no distress  HEENT:  No scleral icterus, no conjunctival irritation.   Mallampati IV  NECK:  No thyromegaly, no bruits  LYMPH:  No cervical or supraclavicular adenopathy  HEART:  Regular rate and rhythm, no murmurs  LUNGS:  Clear bilaterally. Diminished   ABDOMEN:  No distention, no organomegaly  EXTREMITIES:  No edema, no digital clubbing  NEURO:  No localizing deficits, CN II-XII intact    Pulmonary Function Testing Completed at outside hospital  Overall outside pulm notes suggest severe obstruction with bronchodilator response but missing a lot of information     Chest imaging from 11/2020 is reviewed. My interpretation is mild ground glass      1/21  Total IgE Level:  376  Significant allergies:  Multiple       ASTHMA CONTROL TEST 6/23/2021 3/9/2021   In the past 4 weeks, how much of the time did your asthma keep you from getting as much done at work, school or at home? 5 4   During the past 4 weeks, how often have you had shortness of breath? 3 3   During the past 4 weeks, how often did your asthma symptoms (wheezing, coughing, shortness of breath, chest tightness or pain) wake you up at night or earlier than usual in the morning? 5 4   During the past 4 weeks, how often have you used your rescue inhaler or nebulizer medication (such as albuterol)? 4 2   How would you rate your asthma control during the past 4 weeks? 4 3   Asthma Control Test Total Score 21 16       I reviewed above labs and studies pertinent to this visit and date    Assessment/Plan:  1. Severe asthma without complication, unspecified whether persistent  Controlled on symbicort bid, theophylline qd and prn albuterol    2. Nocturnal hypoxia  Uses 2 liters of oxygen with sleep. Does benefit. 3. Elevated IgE level  ~376. Multiple allergens on his panel. Did not notice any benefit with singulair.      Follow up in 6 months

## 2021-12-09 NOTE — PATIENT INSTRUCTIONS
Patient Education        DASH Diet: Care Instructions  Your Care Instructions     The DASH diet is an eating plan that can help lower your blood pressure. DASH stands for Dietary Approaches to Stop Hypertension. Hypertension is high blood pressure. The DASH diet focuses on eating foods that are high in calcium, potassium, and magnesium. These nutrients can lower blood pressure. The foods that are highest in these nutrients are fruits, vegetables, low-fat dairy products, nuts, seeds, and legumes. But taking calcium, potassium, and magnesium supplements instead of eating foods that are high in those nutrients does not have the same effect. The DASH diet also includes whole grains, fish, and poultry. The DASH diet is one of several lifestyle changes your doctor may recommend to lower your high blood pressure. Your doctor may also want you to decrease the amount of sodium in your diet. Lowering sodium while following the DASH diet can lower blood pressure even further than just the DASH diet alone. Follow-up care is a key part of your treatment and safety. Be sure to make and go to all appointments, and call your doctor if you are having problems. It's also a good idea to know your test results and keep a list of the medicines you take. How can you care for yourself at home? Following the DASH diet  · Eat 4 to 5 servings of fruit each day. A serving is 1 medium-sized piece of fruit, ½ cup chopped or canned fruit, 1/4 cup dried fruit, or 4 ounces (½ cup) of fruit juice. Choose fruit more often than fruit juice. · Eat 4 to 5 servings of vegetables each day. A serving is 1 cup of lettuce or raw leafy vegetables, ½ cup of chopped or cooked vegetables, or 4 ounces (½ cup) of vegetable juice. Choose vegetables more often than vegetable juice. · Get 2 to 3 servings of low-fat and fat-free dairy each day. A serving is 8 ounces of milk, 1 cup of yogurt, or 1 ½ ounces of cheese. · Eat 6 to 8 servings of grains each day. A serving is 1 slice of bread, 1 ounce of dry cereal, or ½ cup of cooked rice, pasta, or cooked cereal. Try to choose whole-grain products as much as possible. · Limit lean meat, poultry, and fish to 2 servings each day. A serving is 3 ounces, about the size of a deck of cards. · Eat 4 to 5 servings of nuts, seeds, and legumes (cooked dried beans, lentils, and split peas) each week. A serving is 1/3 cup of nuts, 2 tablespoons of seeds, or ½ cup of cooked beans or peas. · Limit fats and oils to 2 to 3 servings each day. A serving is 1 teaspoon of vegetable oil or 2 tablespoons of salad dressing. · Limit sweets and added sugars to 5 servings or less a week. A serving is 1 tablespoon jelly or jam, ½ cup sorbet, or 1 cup of lemonade. · Eat less than 2,300 milligrams (mg) of sodium a day. If you limit your sodium to 1,500 mg a day, you can lower your blood pressure even more. · Be aware that all of these are the suggested number of servings for people who eat 1,800 to 2,000 calories a day. Your recommended number of servings may be different if you need more or fewer calories. Tips for success  · Start small. Do not try to make dramatic changes to your diet all at once. You might feel that you are missing out on your favorite foods and then be more likely to not follow the plan. Make small changes, and stick with them. Once those changes become habit, add a few more changes. · Try some of the following:  ? Make it a goal to eat a fruit or vegetable at every meal and at snacks. This will make it easy to get the recommended amount of fruits and vegetables each day. ? Try yogurt topped with fruit and nuts for a snack or healthy dessert. ? Add lettuce, tomato, cucumber, and onion to sandwiches. ? Combine a ready-made pizza crust with low-fat mozzarella cheese and lots of vegetable toppings. Try using tomatoes, squash, spinach, broccoli, carrots, cauliflower, and onions. ?  Have a variety of cut-up vegetables with a low-fat dip as an appetizer instead of chips and dip. ? Sprinkle sunflower seeds or chopped almonds over salads. Or try adding chopped walnuts or almonds to cooked vegetables. ? Try some vegetarian meals using beans and peas. Add garbanzo or kidney beans to salads. Make burritos and tacos with mashed lama beans or black beans. Where can you learn more? Go to [email or web address].[employer]. org and sign in to your [employer] account. Enter V985 in the [employer] box to learn more about \"DASH Diet: Care Instructions. \"     If you do not have an account, please click on the \"Sign Up Now\" link. Current as of: April 29, 2021               Content Version: 13.0  © 2456-2216 Healthwise, Incorporated. Care instructions adapted under license by [facility] ([facility]). If you have questions about a medical condition or this instruction, always ask your healthcare professional. [address] any warranty or liability for your use of this information.

## 2021-12-09 NOTE — PROGRESS NOTES
[address]      Cardiology Consult    Xochitl Green  [birth year] December 14, 2021    Primary care physician: Vince Vivar MD  Reason for Referral: CHF    CC: \"Maybe a little more short of breath. \"     HPI:  The patient is [de-identified] y.o. male with a past medical history significant for systolic heart failure s/p BiV ICD, hypertension, coronary artery disease, and COPD who presents today for management of heart failure. He presented to the hospital 11/17/20 with complaints of progressive shortness of breath and also having a sore throat and headaches. There was no initial obvious etiology of his shortness of breath but because his breathing appeared still labored in the emergency department he was admitted to the hospital. His troponin was normal. His proBNP was only 62. He has a history of a nonischemic cardiomyopathy s/p BiV ICD but his ejection fraction normalized. He previously followed with [facility] cardiology. The patient denies any adverse drug reactions to cardiovascular medicines but spironolactone and lisinopril were discontinued for unclear reasons. Today, he states overall he is doing well but has chronic STANLEY that he feels progressed over the past 2 months. He relates the worsening STANLEY to the cold temperatures. He follows with Dr. [facility] for management of his COPD. He states his weight remain stable and denies any worsening LE edema. He reports compliance with his medications and tolerating. Patient denies exertional chest pain/pressure, dyspnea at rest, PND, orthopnea, palpitations, lightheadedness, weight changes, changes in LE edema, and syncope.      Past Medical History:   Diagnosis Date    AICD (automatic implantable cardioverter defibrillator) at end of life     pt instructed to bring ID card--hx of LBBB     Anxiety and depression     Arthritis     Back pain     CHF (congestive heart failure) (HCC)     GERD (gastroesophageal reflux disease)     History of blood transfusion     Hyperlipidemia     Hypertension     Hypothyroidism     hypothyroidism    S/P cardiac cath     Nl LVEF and NL cors/ chronic LBBB now paced    Sleep apnea     pt does not use Cpap machine    Wears glasses      Past Surgical History:   Procedure Laterality Date    COLONOSCOPY  2016    hua'celina--elizabeth      ELBOW SURGERY Bilateral     Tennis elbow    HAMMER TOE SURGERY      x2    JOINT REPLACEMENT Left     Left Knee    KNEE ARTHROSCOPY      Rt and Left(2)    KNEE CARTILAGE SURGERY      Repair Left    LUMBAR LAMINECTOMY  (L2-3), (L4-5),     screws    TURP       Family History   Problem Relation Age of Onset    Breast Cancer Mother     Other Father         Peritonitis    Other Daughter         Postpartum Cardiomyopathy     Social History     Tobacco Use    Smoking status: Former Smoker     Packs/day: 0.25     Years: 10.00     Pack years: 2.50     Types: Cigarettes     Start date: 1960     Quit date: 1970     Years since quittin.9    Smokeless tobacco: Never Used   Vaping Use    Vaping Use: Never used   Substance Use Topics    Alcohol use:  Yes     Alcohol/week: 7.0 standard drinks     Types: 7 Cans of beer per week    Drug use: No     Allergies   Allergen Reactions    Piroxicam Hives    Penicillins Rash and Hives     Current Outpatient Medications   Medication Sig Dispense Refill    citalopram (CELEXA) 40 MG tablet TAKE 1 TABLET BY MOUTH EVERY NIGHT 90 tablet 2    atorvastatin (LIPITOR) 40 MG tablet TAKE 1 TABLET BY MOUTH AT BEDTIME 90 tablet 0    budesonide-formoterol (SYMBICORT) 160-4.5 MCG/ACT AERO Inhale 2 puffs into the lungs 2 times daily 1 Inhaler 5    AUGUSTINE-24 300 MG extended release capsule TAKE 1 CAPSULE BY MOUTH DAILY 30 capsule 5    levothyroxine (SYNTHROID) 50 MCG tablet Take 1 tablet by mouth nightly 90 tablet 3    lisinopril (PRINIVIL;ZESTRIL) 5 MG tablet TAKE 1 TABLET BY MOUTH DAILY 90 tablet 3    amitriptyline (ELAVIL) 25 MG tablet TAKE 1 TABLET BY MOUTH EVERY NIGHT AT BEDTIME 90 tablet 5    carvedilol (COREG) 12.5 MG tablet TAKE 1 TABLET BY MOUTH TWICE DAILY WITH MEALS 180 tablet 3    aspirin 81 MG chewable tablet Take 1 tablet by mouth daily 30 tablet 3    albuterol sulfate  (90 Base) MCG/ACT inhaler Inhale 2 puffs into the lungs every 6 hours as needed       No current facility-administered medications for this visit. Review of Systems:  · Constitutional: No unanticipated weight loss. There's been no change in energy level, sleep pattern, or activity level. No fevers, chills. · Eyes: No visual changes or diplopia. No scleral icterus. · ENT: No Headaches, hearing loss or vertigo. No mouth sores or sore throat. · Cardiovascular: as reviewed in HPI  · Respiratory: No cough or wheezing, no sputum production. No hemoptysis. · Gastrointestinal: No abdominal pain, appetite loss, blood in stools. No change in bowel or bladder habits. · Genitourinary: No dysuria, trouble voiding, or hematuria. · Musculoskeletal:  No gait disturbance, no joint complaints. · Integumentary: No rash or pruritis. · Neurological: No headache, diplopia, change in muscle strength, numbness or tingling. · Psychiatric: No anxiety or depression. · Endocrine: No temperature intolerance. No excessive thirst, fluid intake, or urination. No tremor. · Hematologic/Lymphatic: No abnormal bruising or bleeding, blood clots or swollen lymph nodes. · Allergic/Immunologic: No nasal congestion or hives. Physical Exam:   /78 (Site: Left Upper Arm, Position: Sitting)   Pulse 73   Ht 5' 10\" (1.778 m)   Wt 208 lb (94.3 kg)   SpO2 94%   BMI 29.84 kg/m²   Wt Readings from Last 3 Encounters:   12/14/21 208 lb (94.3 kg)   11/08/21 209 lb 6.4 oz (95 kg)   08/27/21 203 lb (92.1 kg)     Constitutional: He is oriented to person, place, and time. He appears well-developed and well-nourished. In no acute distress.    Head: Normocephalic and atraumatic. Pupils equal and round. Neck: Neck supple. No JVP or carotid bruit appreciated. No mass and no thyromegaly present. No lymphadenopathy present. Cardiovascular: Normal rate. Normal heart sounds. Exam reveals no gallop and no friction rub. No murmur heard. Pulmonary/Chest: Effort normal and breath sounds normal. No respiratory distress. He has no wheezes, rhonchi or rales. Abdominal: Soft, non-tender. Bowel sounds are normal. He exhibits no organomegaly, mass or bruit. Extremities: No edema. No cyanosis or clubbing. Pulses are 2+ radial/carotid bilaterally. Neurological: No gross cranial nerve deficit. Coordination normal.   Skin: Skin is warm and dry. There is no rash or diaphoresis. Psychiatric: He has a normal mood and affect. His speech is normal and behavior is normal.     Lab Review:   FLP:    Lab Results   Component Value Date    TRIG 335 2021    HDL 52 2021    HDL 56 2012    LDLCALC see below 2021    LDLDIRECT 109 2021    LABVLDL see below 2021     BUN/Creatinine:    Lab Results   Component Value Date    BUN 13 2021    CREATININE 1.4 2021     EKG Interpretation: 2020.  Electronic ventricular pacemaker. Image Review:     Echo: 20  There is a pacemaker lead in the right ventricle. Overall left ventricular ejection fraction is estimated to be 50-55%. The left ventricular function is low normal.   Paradoxical septal motion secondary to paced rhythm. There is trivial mitral and aortic regurgitation.     Stress Test: 2020  The LV EF is 69%  Normal global and regional wall motion in all territories.   There is a medium size, fixed defect in the apical wall consistent with soft tissue attenuation.     Cath: 2009  LEFT MAIN: Normal                 LEFT ANTERIOR DESCENDIN% mid           LEFT CIRCUMFLEX: Normal                 RIGHT CORONARY:  Normal                  Assessment/Plan:   1) Chronic diastolic

## 2021-12-14 ENCOUNTER — NURSE ONLY (OUTPATIENT)
Dept: CARDIOLOGY CLINIC | Age: 81
End: 2021-12-14
Payer: MEDICARE

## 2021-12-14 ENCOUNTER — OFFICE VISIT (OUTPATIENT)
Dept: CARDIOLOGY CLINIC | Age: 81
End: 2021-12-14
Payer: MEDICARE

## 2021-12-14 VITALS
BODY MASS INDEX: 29.78 KG/M2 | WEIGHT: 208 LBS | HEIGHT: 70 IN | HEART RATE: 73 BPM | OXYGEN SATURATION: 94 % | SYSTOLIC BLOOD PRESSURE: 124 MMHG | DIASTOLIC BLOOD PRESSURE: 78 MMHG

## 2021-12-14 DIAGNOSIS — Z95.810 BIVENTRICULAR ICD (IMPLANTABLE CARDIOVERTER-DEFIBRILLATOR) IN PLACE: ICD-10-CM

## 2021-12-14 DIAGNOSIS — I50.22 CHRONIC SYSTOLIC HEART FAILURE (HCC): Primary | ICD-10-CM

## 2021-12-14 DIAGNOSIS — I50.22 CHRONIC SYSTOLIC HEART FAILURE (HCC): ICD-10-CM

## 2021-12-14 DIAGNOSIS — I10 ESSENTIAL HYPERTENSION: ICD-10-CM

## 2021-12-14 DIAGNOSIS — I47.29 NSVT (NONSUSTAINED VENTRICULAR TACHYCARDIA): ICD-10-CM

## 2021-12-14 DIAGNOSIS — Z95.810 PRESENCE OF BIVENTRICULAR AICD: ICD-10-CM

## 2021-12-14 DIAGNOSIS — I44.7 LBBB (LEFT BUNDLE BRANCH BLOCK): ICD-10-CM

## 2021-12-14 LAB
HCT VFR BLD CALC: 47 % (ref 40.5–52.5)
HEMOGLOBIN: 15.3 G/DL (ref 13.5–17.5)
MCH RBC QN AUTO: 29.9 PG (ref 26–34)
MCHC RBC AUTO-ENTMCNC: 32.6 G/DL (ref 31–36)
MCV RBC AUTO: 91.7 FL (ref 80–100)
PDW BLD-RTO: 13.8 % (ref 12.4–15.4)
PLATELET # BLD: 173 K/UL (ref 135–450)
PMV BLD AUTO: 8.2 FL (ref 5–10.5)
PRO-BNP: 59 PG/ML (ref 0–449)
RBC # BLD: 5.13 M/UL (ref 4.2–5.9)
WBC # BLD: 5 K/UL (ref 4–11)

## 2021-12-14 PROCEDURE — 4040F PNEUMOC VAC/ADMIN/RCVD: CPT | Performed by: INTERNAL MEDICINE

## 2021-12-14 PROCEDURE — 93284 PRGRMG EVAL IMPLANTABLE DFB: CPT | Performed by: INTERNAL MEDICINE

## 2021-12-14 PROCEDURE — G8427 DOCREV CUR MEDS BY ELIG CLIN: HCPCS | Performed by: INTERNAL MEDICINE

## 2021-12-14 PROCEDURE — 1036F TOBACCO NON-USER: CPT | Performed by: INTERNAL MEDICINE

## 2021-12-14 PROCEDURE — G8484 FLU IMMUNIZE NO ADMIN: HCPCS | Performed by: INTERNAL MEDICINE

## 2021-12-14 PROCEDURE — G8417 CALC BMI ABV UP PARAM F/U: HCPCS | Performed by: INTERNAL MEDICINE

## 2021-12-14 PROCEDURE — 99214 OFFICE O/P EST MOD 30 MIN: CPT | Performed by: INTERNAL MEDICINE

## 2021-12-14 PROCEDURE — 1123F ACP DISCUSS/DSCN MKR DOCD: CPT | Performed by: INTERNAL MEDICINE

## 2021-12-14 NOTE — PROGRESS NOTES
Pt seen in clinic today for cardiac device interrogation. Their device is a BoSc 3 chamber BiV CRTD  Based on threshold, impedance, and intrinsic sensing tests run today, the device appears to be functioning with minimal deviation from established trends. Remaining battery life is 8y  AP 57%        %         CRTP 100%      2 episodes atach noted as \"VT\" - historically found on pt device on previous remote checks. on bb    Rx:  carvedilol (COREG) 12.5 MG tablet TAKE 1 TABLET BY MOUTH TWICE DAILY WITH MEALS     Pt was informed of findings today and general questions have been answered with regard to device. Home monitoring permission to transfer will be requested today. Results discussed with or to be reviewed by EP    Pt to see Dr. Willis Anne in clinic today following their device check.

## 2021-12-22 DIAGNOSIS — J45.909 UNCOMPLICATED ASTHMA, UNSPECIFIED ASTHMA SEVERITY, UNSPECIFIED WHETHER PERSISTENT: ICD-10-CM

## 2022-01-06 ENCOUNTER — NURSE ONLY (OUTPATIENT)
Dept: CARDIOLOGY CLINIC | Age: 82
End: 2022-01-06
Payer: MEDICARE

## 2022-01-06 DIAGNOSIS — I44.7 LBBB (LEFT BUNDLE BRANCH BLOCK): ICD-10-CM

## 2022-01-06 DIAGNOSIS — I50.22 CHRONIC SYSTOLIC HEART FAILURE (HCC): ICD-10-CM

## 2022-01-06 DIAGNOSIS — Z95.810 BIVENTRICULAR ICD (IMPLANTABLE CARDIOVERTER-DEFIBRILLATOR) IN PLACE: ICD-10-CM

## 2022-01-06 DIAGNOSIS — I47.29 NSVT (NONSUSTAINED VENTRICULAR TACHYCARDIA): ICD-10-CM

## 2022-01-06 NOTE — PROGRESS NOTES
We received remote transmission from patient's CRT-D monitor at home. Transmission shows normal sensing and pacing function. PMT recordings noted. Ap 54%  RVp 100%  LVp 100%    EP physician will review. See interrogation under cardiology tab in the [address] [place] [address] [address] field for more details. Will continue to monitor remotely.

## 2022-01-07 PROCEDURE — 93295 DEV INTERROG REMOTE 1/2/MLT: CPT | Performed by: INTERNAL MEDICINE

## 2022-01-07 PROCEDURE — 93296 REM INTERROG EVL PM/IDS: CPT | Performed by: INTERNAL MEDICINE

## 2022-01-19 RX ORDER — LISINOPRIL 5 MG/1
5 TABLET ORAL DAILY
Qty: 90 TABLET | Refills: 3 | Status: SHIPPED | OUTPATIENT
Start: 2022-01-19

## 2022-01-19 RX ORDER — CARVEDILOL 12.5 MG/1
TABLET ORAL
Qty: 180 TABLET | Refills: 3 | Status: SHIPPED | OUTPATIENT
Start: 2022-01-19

## 2022-01-19 RX ORDER — ATORVASTATIN CALCIUM 40 MG/1
TABLET, FILM COATED ORAL
Qty: 90 TABLET | Refills: 0 | Status: SHIPPED | OUTPATIENT
Start: 2022-01-19 | End: 2022-04-19

## 2022-01-26 ENCOUNTER — HOSPITAL ENCOUNTER (OUTPATIENT)
Dept: CT IMAGING | Age: 82
Discharge: HOME | End: 2022-01-26
Payer: MEDICARE

## 2022-01-26 DIAGNOSIS — K59.00 CONSTIPATION, UNSPECIFIED CONSTIPATION TYPE: ICD-10-CM

## 2022-01-26 PROCEDURE — 74176 CT ABD & PELVIS W/O CONTRAST: CPT

## 2022-01-26 PROCEDURE — 6360000004 HC RX CONTRAST MEDICATION: Performed by: INTERNAL MEDICINE

## 2022-01-26 RX ADMIN — IOHEXOL 50 ML: 240 INJECTION, SOLUTION INTRATHECAL; INTRAVASCULAR; INTRAVENOUS; ORAL at 09:41

## 2022-02-28 ENCOUNTER — HOSPITAL ENCOUNTER (OUTPATIENT)
Dept: GENERAL RADIOLOGY | Age: 82
Discharge: HOME | End: 2022-02-28
Payer: MEDICARE

## 2022-02-28 ENCOUNTER — HOSPITAL ENCOUNTER (OUTPATIENT)
Age: 82
Discharge: HOME | End: 2022-02-28
Payer: MEDICARE

## 2022-02-28 DIAGNOSIS — K59.00 CONSTIPATION, UNSPECIFIED CONSTIPATION TYPE: ICD-10-CM

## 2022-02-28 PROBLEM — K22.2 ESOPHAGEAL STRICTURE: Status: ACTIVE | Noted: 2022-02-28

## 2022-02-28 PROCEDURE — 74018 RADEX ABDOMEN 1 VIEW: CPT

## 2022-03-08 ENCOUNTER — TELEPHONE (OUTPATIENT)
Dept: CARDIOLOGY CLINIC | Age: 82
End: 2022-03-08

## 2022-03-08 NOTE — LETTER
[facility]ud[facility]  Phone: [phone]  Fax: [phone]    Leandro Cordero MD        March 8, 2022    Leandra Lam  1940    To Whom This May Concern    Pre-operative risk assessment. Patient is intermediate cardiac risk based on RCRI score of 1 (CHF). Patient's risk should not preclude him from proceeding with surgery. Suggest continuation of B-blocker and statin in the lisy-operative period. Aspirin may be held periprocedurally. If you have any questions or concerns, please don't hesitate to call.     Sincerely,        Leandro Cordero MD/ Tony Gardiner MA

## 2022-03-08 NOTE — TELEPHONE ENCOUNTER
Dr. Jakob Brand    Please review and advise regarding cardiac clearance. Patient last seen in the office 12/14/21 with hx of CAD, chronic diastolic CHF with ICD, HTN. Will need to hold ASA the morning of procedure.

## 2022-03-08 NOTE — TELEPHONE ENCOUNTER
[facility] requesting cardiac clearance for patient to have upper endoscopy and colonoscopy on 3/17/2022 with Dr. Farideh Wiggins. Pt is not on any anticoagulant/ antiplatelet therapy except for [id number] that will need to be held the the day of the procedure. Last OV:12/14/2021  Next: none      Fax:  Hailey Montoya [phone]  Phone: [phone]

## 2022-03-08 NOTE — PROGRESS NOTES
Covid testing to be done @ [facility] for Elective Surgery/Invasive Procedures While COVID-19 present in the community     1. Have you tested positive or have been told to self-isolate for COVID-19 like symptoms within the past 28 days?no  2. Do you currently have any of the following symptoms?no  ? Fever >100.0 F or 99.9 F in immunocompromised patients? ? New onset cough, shortness of breath or difficulty breathing? ? New onset sore throat, myalgia (muscle aches and pains), headache, loss of taste/smell or diarrhea? 3. Have you had a potential exposure to COVID-19 within the past 14 days by:no  ? Close contact with a confirmed case? ? Close contact with a healthcare worker,  or essential infrastructure worker (grocery store, [employer], gas station, public utilities or transportation)?no  ? Do you reside in a congregate setting such as; skilled nursing facility, adult home, correctional facility, homeless shelter or other institutional setting? ? Have you had recent travel to a known COVID-19 hotspot? Indicate if the patient has a positive screen by answering yes to one or more of the above questions. [address] [facility] time______0730______        Surgery time______0900______    Take the following medications with a sip of water:    Do not eat or drink anything after 12:00 midnight prior to your surgery. This includes water chewing gum, mints and ice chips. You may brush your teeth and gargle the morning of your surgery, but do not swallow the water     Please see your family doctor/pediatrician for a history and physical and/or concerning medications. Bring any test results/reports from your physicians office.    If you are under the care of a heart doctor or specialist doctor, please be aware that you may be asked to them for clearance    You may be asked to stop blood thinners such as Coumadin, Plavix, Fragmin, Lovenox, etc., or any anti-inflammatories such as:  Aspirin, Ibuprofen, Advil, Naproxen prior to your surgery. We also ask that you stop any OTC medications such as fish oil, vitamin E, glucosamine, garlic, Multivitamins, COQ 10, etc.    We ask that you do not smoke 24 hours prior to surgery  We ask that you do not  drink any alcoholic beverages 24 hours prior to surgery     You must make arrangements for a responsible adult to take you home after your surgery. For your safety you will not be allowed to leave alone or drive yourself home. Your surgery will be cancelled if you do not have a ride home. Also for your safety, it is strongly suggested that someone stay with you the first 24 hours after your surgery. A parent or legal guardian must accompany a child scheduled for surgery and plan to stay at the hospital until the child is discharged. Please do not bring other children with you. For your comfort, please wear simple loose fitting clothing to the hospital.  Please do not bring valuables. Do not wear any make-up or nail polish on your fingers or toes      For your safety, please do not wear any jewelry or body piercing's on the day of surgery. All jewelry must be removed. If you have dentures, they will be removed before going to operating room. For your convenience, we will provide you with a container. If you wear contact lenses or glasses, they will be removed, please bring a case for them. If you have a living will and a durable power of  for healthcare, please bring in a copy. As part of our patient safety program to minimize surgical site infections, we ask you to do the following:    · Please notify your surgeon if you develop any illness between         now and the  day of your surgery.     · This includes a cough, cold, fever, sore throat, nausea,         or vomiting, and diarrhea, etc.  ·  Please notify your surgeon if you experience dizziness, shortness         of breath or blurred vision between now and the time of your surgery. Do not shave your operative site 96 hours prior to surgery. For face and neck surgery, men may use an electric razor 48 hours   prior to surgery. You may shower the night before surgery or the morning of   your surgery with an antibacterial soap. You will need to bring a photo ID and insurance card    [facility] has an onsite pharmacy, would you like to utilize our pharmacy     If you will be staying overnight and use a C-pap machine, please bring   your C-pap to hospital     Our goal is to provide you with excellent care, therefore, visitors will be limited to two(2) in the room at a time so that we may focus on providing this care for you. Please contact pre-admission testing if you have any further questions. [facility] phone number:  [phone] PAT fax number:  [phone]  Please note these are generalized instructions for all surgical cases, you may be provided with more specific instructions according to your surgery.   If positive---Pt instructed to notify MD ASAP   If negative--pt was instructed to bring results DOP/DOS

## 2022-03-16 ENCOUNTER — ANESTHESIA EVENT (OUTPATIENT)
Dept: ENDOSCOPY | Age: 82
End: 2022-03-16
Payer: MEDICARE

## 2022-03-16 ASSESSMENT — LIFESTYLE VARIABLES: SMOKING_STATUS: 0

## 2022-03-16 NOTE — ANESTHESIA PRE PROCEDURE
Department of Anesthesiology  Preprocedure Note       Name:  Earnest Foss   Age:  [de-identified] y.o.  :  1940                                          MRN:  [id number]         Date:  3/17/2022      Surgeon: Edis Rivera):  Cheri Keenan MD    Procedure: Procedure(s):  COLONOSCOPY  ESOPHAGOGASTRODUODENOSCOPY    Medications prior to admission:   Prior to Admission medications    Medication Sig Start Date End Date Taking? Authorizing Provider   carvedilol (COREG) 12.5 MG tablet TAKE 1 TABLET BY MOUTH TWICE DAILY WITH MEALS 22  Yes Ruth Connors MD   lisinopril (PRINIVIL;ZESTRIL) 5 MG tablet TAKE 1 TABLET BY MOUTH DAILY 22  Yes Ruth Connors MD   atorvastatin (LIPITOR) 40 MG tablet TAKE 1 TABLET BY MOUTH AT BEDTIME 22  Yes Ruth Connors MD   butalbital-aspirin-caffeine [facility]) -40 MG per capsule Take 1 capsule by mouth every 8 hours as needed for Headaches for up to 10 days.  1/5/22 3/8/22 Yes Ruth Connors MD   theophylline (AUGUSTINE-24) 300 MG extended release capsule TAKE 1 CAPSULE BY MOUTH DAILY 21  Yes Jerson Christiansen DO   citalopram (CELEXA) 40 MG tablet TAKE 1 TABLET BY MOUTH EVERY NIGHT 10/21/21  Yes Ruth Connors MD   budesonide-formoterol [facility]) 160-4.5 MCG/ACT AERO Inhale 2 puffs into the lungs 2 times daily 21  Yes Jerson Christiansen DO   levothyroxine (SYNTHROID) 50 MCG tablet Take 1 tablet by mouth nightly 21  Yes Ruth Connors MD   amitriptyline (ELAVIL) 25 MG tablet TAKE 1 TABLET BY MOUTH EVERY NIGHT AT BEDTIME 21  Yes Ruth Connors MD   aspirin 81 MG chewable tablet Take 1 tablet by mouth daily 20  Yes Ruth Connors MD   albuterol sulfate  (90 Base) MCG/ACT inhaler Inhale 2 puffs into the lungs every 6 hours as needed 20  Yes Historical Provider, MD       Current medications:    Current Facility-Administered Medications   Medication Dose Route Frequency Provider Last Rate Last Admin    0.9 % sodium chloride infusion   IntraVENous Continuous Marylou Velarde MD        sodium chloride flush 0.9 % injection 10 mL  10 mL IntraVENous 2 times per day Marylou Velarde MD        sodium chloride flush 0.9 % injection 10 mL  10 mL IntraVENous PRN Marylou Velarde MD        0.9 % sodium chloride infusion  25 mL IntraVENous PRN Marylou Velarde MD        sodium chloride flush 0.9 % injection 5-40 mL  5-40 mL IntraVENous 2 times per day Soco Golden MD        sodium chloride flush 0.9 % injection 5-40 mL  5-40 mL IntraVENous PRN Soco Golden MD        0.9 % sodium chloride infusion  25 mL IntraVENous PRN Soco Golden MD        ondansetron [facility] injection 4 mg  4 mg IntraVENous Once PRN Soco Golden MD           Allergies:     Allergies   Allergen Reactions    Piroxicam Hives    Penicillins Rash and Hives       Problem List:    Patient Active Problem List   Diagnosis Code    GERD (gastroesophageal reflux disease) K21.9    Insomnia G47.00    Personal history of endocrine, metabolic, and immunity disorder GPM5852    Abdominal pain, epigastric R10.13    Benign prostatic hyperplasia without lower urinary tract symptoms N40.0    Dyspnea on exertion R06.00    Weight gain R63.5    Fatigue R53.83    Lung nodule R91.1    LBBB (left bundle branch block) I44.7    Back pain M54.9    Anxiety F41.9    Constipation K59.00    Sleep apnea G47.30    Knee pain, left M25.562    Hyperlipidemia E78.5    Hypothyroidism E03.9    Anorexia R63.0    Facial edema R60.0    DDD (degenerative disc disease), lumbar M51.36    Chronic systolic heart failure (HCC) I50.22    Presence of biventricular AICD Z95.810    Vertigo R42    Dyspnea R06.00    Bronchitis J40    Rash R21    Hearing decreased H91.90    Bilateral hearing loss due to cerumen impaction H61.23    Chronic obstructive pulmonary disease (HCC) J44.9    Nocturnal hypoxia G47.34    Biventricular ICD (implantable cardioverter-defibrillator) in place Z95.810    Essential hypertension I10    Coronary artery disease involving native coronary artery of native heart without angina pectoris I25.10    NSVT (nonsustained ventricular tachycardia) ([facility]) I47.2    Esophageal stricture K22.2       Past Medical History:        Diagnosis Date    AICD (automatic implantable cardioverter defibrillator) at end of life     pt instructed to bring ID card--hx of LBBB     Anxiety and depression     Arthritis     Back pain     CHF (congestive heart failure) ([facility])     GERD (gastroesophageal reflux disease)     History of blood transfusion     Hyperlipidemia     Hypertension     Hypothyroidism     hypothyroidism    S/P cardiac cath     Nl LVEF and NL cors/ chronic LBBB now paced    Sleep apnea     pt does not use Cpap machine    Wears glasses        Past Surgical History:        Procedure Laterality Date    COLONOSCOPY  2016    o'celina--elizabeth      ELBOW SURGERY Bilateral     Tennis elbow    HAMMER TOE SURGERY      x2    JOINT REPLACEMENT Left     Left Knee    KNEE ARTHROSCOPY      Rt and Left(2)    KNEE CARTILAGE SURGERY      Repair Left    LUMBAR LAMINECTOMY  (L2-3), (L4-5),     screws    TURP         Social History:    Social History     Tobacco Use    Smoking status: Former Smoker     Packs/day: 0.25     Years: 10.00     Pack years: 2.50     Types: Cigarettes     Start date: 1960     Quit date: 1970     Years since quittin.2    Smokeless tobacco: Never Used   Substance Use Topics    Alcohol use:  Yes     Alcohol/week: 7.0 standard drinks     Types: 7 Cans of beer per week     Comment: one can a day                                Counseling given: Not Answered      Vital Signs (Current):   Vitals:    22 1422   Weight: 195 lb (88.5 kg)   Height: 5' 10\" (1.778 m)                                              BP Readings from Last 3 Encounters:   22 130/78   22 124/80   22 124/80       NPO Status:                                                                                 BMI:   Wt Readings from Last 3 Encounters:   03/08/22 195 lb (88.5 kg)   02/28/22 194 lb (88 kg)   01/21/22 202 lb (91.6 kg)     Body mass index is 27.98 kg/m². CBC:   Lab Results   Component Value Date    WBC 8.7 01/21/2022    RBC 5.26 01/21/2022    HGB 16.1 01/21/2022    HCT 47.8 01/21/2022    MCV 90.9 01/21/2022    RDW 13.7 01/21/2022     01/21/2022       CMP:   Lab Results   Component Value Date     01/21/2022    K 4.1 01/21/2022    K 4.1 11/18/2020    CL 99 01/21/2022    CO2 26 01/21/2022    BUN 19 01/21/2022    CREATININE 1.4 01/21/2022    GFRAA 59 01/21/2022    GFRAA 59 12/04/2012    AGRATIO 1.8 01/21/2022    LABGLOM 49 01/21/2022    GLUCOSE 85 01/21/2022    PROT 7.0 01/21/2022    PROT 7.4 11/27/2012    CALCIUM 9.5 01/21/2022    BILITOT 1.0 01/21/2022    ALKPHOS 173 01/21/2022    AST 20 01/21/2022    ALT 22 01/21/2022       POC Tests: No results for input(s): POCGLU, POCNA, POCK, POCCL, POCBUN, POCHEMO, POCHCT in the last 72 hours.     Coags:   Lab Results   Component Value Date    PROTIME 12.8 09/17/2019    INR 1.0 09/17/2019       HCG (If Applicable): No results found for: PREGTESTUR, PREGSERUM, HCG, HCGQUANT     ABGs: No results found for: PHART, PO2ART, COR8JVR, CYL0GFM, BEART, K7FUPJWX     Type & Screen (If Applicable):  Lab Results   Component Value Date    LABABO B 03/25/2010    [address] Positive 03/25/2010       Drug/Infectious Status (If Applicable):  No results found for: HIV, HEPCAB    COVID-19 Screening (If Applicable):   Lab Results   Component Value Date    COVID19 Not Detected 11/17/2020    COVID19 Not Detected 11/17/2020           Anesthesia Evaluation   no history of anesthetic complications:   Airway: Mallampati: III  TM distance: >3 FB     Comment: Short neck, kyphotic, limited extension  Mouth opening: > = 3 FB Dental:      Comment: Denies loose teeth    Pulmonary: breath sounds clear to auscultation  (+) COPD (symbicort):  sleep apnea:      (-) rhonchi, wheezes, rales and not a current smoker (s/p remote cessation)                           Cardiovascular:  Exercise tolerance: no interval change,   (+) hypertension:, pacemaker (BiV-ICD):, CAD:, CHF:, STANLEY:, hyperlipidemia    (-) weak pulses and peripheral edema      Rhythm: regular  Rate: normal           Beta Blocker:  Dose within 24 Hrs      ROS comment: Echocardiogram  Summary  The left atrium is mildly dilated. Mildly dilated right ventricle. Pacer / ICD wire is visualized in the right ventricle. The right atrium is mildly dilated. Pacemaker / ICD lead is visualized in the right atrium. IVC size is normal (<2.1 cm) but collapses < 50% with respiration consistent with elevated RA pressure (8 mmHg). Unable to estimate pulmonary artery pressure secondary to incomplete TR jet envelope. Neuro/Psych:   (+) headaches: migraine headaches, depression/anxiety    (-) seizures, TIA and CVA            ROS comment: Chronic back pain s/p lumbar laminectomy GI/Hepatic/Renal:   (+) GERD:, renal disease (SCr: 1.4): CRI, bowel prep,      (-) liver disease and no morbid obesity      ROS comment: h/o dyphagia: \"choking incident\" while on [employer], requiring brief hospitalization in [place]. Endo/Other:    (+) hypothyroidism::., .    (-) diabetes mellitus, blood dyscrasia               Abdominal:         (-) obese Abdomen: soft. Vascular: Other Findings: AICD over left chest  Scattered scratches/scabs noted over upper extremities. Anesthesia Plan      MAC     ASA 3     (Magnet in room. NPO appropriate; Mr. Richa Gray denies active nausea / reflux. Reports adequate prep)  Induction: intravenous. Anesthetic plan and risks discussed with patient. Plan discussed with CRNA.         This pre-anesthesia assessment may be used as a history and physical.    DOS STAFF ADDENDUM:    Pt seen and examined, chart reviewed (including anesthesia, drug and allergy history). No interval changes to history and physical examination. Anesthetic plan, risks, benefits, alternatives, and personnel involved discussed with patient. Patient verbalized an understanding and agrees to proceed.       Lindsay Cervantes MD  March 17, 2022  8:15 AM

## 2022-03-17 ENCOUNTER — ANESTHESIA (OUTPATIENT)
Dept: ENDOSCOPY | Age: 82
End: 2022-03-17
Payer: MEDICARE

## 2022-03-17 ENCOUNTER — HOSPITAL ENCOUNTER (OUTPATIENT)
Age: 82
Setting detail: OUTPATIENT SURGERY
Discharge: HOME | End: 2022-03-17
Attending: INTERNAL MEDICINE | Admitting: INTERNAL MEDICINE
Payer: MEDICARE

## 2022-03-17 VITALS
TEMPERATURE: 97.2 F | RESPIRATION RATE: 16 BRPM | OXYGEN SATURATION: 94 % | BODY MASS INDEX: 27.77 KG/M2 | HEIGHT: 70 IN | DIASTOLIC BLOOD PRESSURE: 73 MMHG | WEIGHT: 194 LBS | SYSTOLIC BLOOD PRESSURE: 144 MMHG | HEART RATE: 65 BPM

## 2022-03-17 VITALS
DIASTOLIC BLOOD PRESSURE: 86 MMHG | RESPIRATION RATE: 1 BRPM | SYSTOLIC BLOOD PRESSURE: 174 MMHG | OXYGEN SATURATION: 98 %

## 2022-03-17 DIAGNOSIS — R13.10 DYSPHAGIA, UNSPECIFIED TYPE: ICD-10-CM

## 2022-03-17 DIAGNOSIS — K59.00 CONSTIPATION, UNSPECIFIED CONSTIPATION TYPE: ICD-10-CM

## 2022-03-17 LAB — SARS-COV-2, NAAT: NOT DETECTED

## 2022-03-17 PROCEDURE — 7100000010 HC PHASE II RECOVERY - FIRST 15 MIN: Performed by: INTERNAL MEDICINE

## 2022-03-17 PROCEDURE — 2709999900 HC NON-CHARGEABLE SUPPLY: Performed by: INTERNAL MEDICINE

## 2022-03-17 PROCEDURE — 3609027000 HC COLONOSCOPY: Performed by: INTERNAL MEDICINE

## 2022-03-17 PROCEDURE — 7100000001 HC PACU RECOVERY - ADDTL 15 MIN: Performed by: INTERNAL MEDICINE

## 2022-03-17 PROCEDURE — 6360000002 HC RX W HCPCS: Performed by: NURSE ANESTHETIST, CERTIFIED REGISTERED

## 2022-03-17 PROCEDURE — 7100000000 HC PACU RECOVERY - FIRST 15 MIN: Performed by: INTERNAL MEDICINE

## 2022-03-17 PROCEDURE — C1769 GUIDE WIRE: HCPCS | Performed by: INTERNAL MEDICINE

## 2022-03-17 PROCEDURE — 3609012700 HC EGD DILATION SAVORY: Performed by: INTERNAL MEDICINE

## 2022-03-17 PROCEDURE — 87635 SARS-COV-2 COVID-19 AMP PRB: CPT

## 2022-03-17 PROCEDURE — 3700000001 HC ADD 15 MINUTES (ANESTHESIA): Performed by: INTERNAL MEDICINE

## 2022-03-17 PROCEDURE — 3700000000 HC ANESTHESIA ATTENDED CARE: Performed by: INTERNAL MEDICINE

## 2022-03-17 PROCEDURE — 3609012400 HC EGD TRANSORAL BIOPSY SINGLE/MULTIPLE: Performed by: INTERNAL MEDICINE

## 2022-03-17 PROCEDURE — 88305 TISSUE EXAM BY PATHOLOGIST: CPT

## 2022-03-17 PROCEDURE — 2580000003 HC RX 258: Performed by: ANESTHESIOLOGY

## 2022-03-17 PROCEDURE — 7100000011 HC PHASE II RECOVERY - ADDTL 15 MIN: Performed by: INTERNAL MEDICINE

## 2022-03-17 PROCEDURE — 2500000003 HC RX 250 WO HCPCS: Performed by: NURSE ANESTHETIST, CERTIFIED REGISTERED

## 2022-03-17 PROCEDURE — 6370000000 HC RX 637 (ALT 250 FOR IP)

## 2022-03-17 PROCEDURE — 6370000000 HC RX 637 (ALT 250 FOR IP): Performed by: INTERNAL MEDICINE

## 2022-03-17 RX ORDER — PROPOFOL 10 MG/ML
INJECTION, EMULSION INTRAVENOUS CONTINUOUS PRN
Status: DISCONTINUED | OUTPATIENT
Start: 2022-03-17 | End: 2022-03-17 | Stop reason: SDUPTHER

## 2022-03-17 RX ORDER — SODIUM CHLORIDE 0.9 % (FLUSH) 0.9 %
10 SYRINGE (ML) INJECTION EVERY 12 HOURS SCHEDULED
Status: DISCONTINUED | OUTPATIENT
Start: 2022-03-17 | End: 2022-03-17 | Stop reason: HOSPADM

## 2022-03-17 RX ORDER — SODIUM CHLORIDE 9 MG/ML
INJECTION, SOLUTION INTRAVENOUS CONTINUOUS
Status: DISCONTINUED | OUTPATIENT
Start: 2022-03-17 | End: 2022-03-17 | Stop reason: HOSPADM

## 2022-03-17 RX ORDER — PROPOFOL 10 MG/ML
INJECTION, EMULSION INTRAVENOUS PRN
Status: DISCONTINUED | OUTPATIENT
Start: 2022-03-17 | End: 2022-03-17 | Stop reason: SDUPTHER

## 2022-03-17 RX ORDER — SODIUM CHLORIDE 9 MG/ML
25 INJECTION, SOLUTION INTRAVENOUS PRN
Status: DISCONTINUED | OUTPATIENT
Start: 2022-03-17 | End: 2022-03-17 | Stop reason: HOSPADM

## 2022-03-17 RX ORDER — SODIUM CHLORIDE 0.9 % (FLUSH) 0.9 %
10 SYRINGE (ML) INJECTION PRN
Status: DISCONTINUED | OUTPATIENT
Start: 2022-03-17 | End: 2022-03-17 | Stop reason: HOSPADM

## 2022-03-17 RX ORDER — SIMETHICONE 20 MG/.3ML
EMULSION ORAL PRN
Status: DISCONTINUED | OUTPATIENT
Start: 2022-03-17 | End: 2022-03-17 | Stop reason: ALTCHOICE

## 2022-03-17 RX ORDER — SODIUM CHLORIDE 0.9 % (FLUSH) 0.9 %
5-40 SYRINGE (ML) INJECTION PRN
Status: DISCONTINUED | OUTPATIENT
Start: 2022-03-17 | End: 2022-03-17 | Stop reason: HOSPADM

## 2022-03-17 RX ORDER — OMEPRAZOLE 40 MG/1
40 CAPSULE, DELAYED RELEASE ORAL
Qty: 90 CAPSULE | Refills: 1 | Status: SHIPPED | OUTPATIENT
Start: 2022-03-17

## 2022-03-17 RX ORDER — LABETALOL HYDROCHLORIDE 5 MG/ML
INJECTION, SOLUTION INTRAVENOUS PRN
Status: DISCONTINUED | OUTPATIENT
Start: 2022-03-17 | End: 2022-03-17 | Stop reason: SDUPTHER

## 2022-03-17 RX ORDER — SODIUM CHLORIDE 0.9 % (FLUSH) 0.9 %
5-40 SYRINGE (ML) INJECTION EVERY 12 HOURS SCHEDULED
Status: DISCONTINUED | OUTPATIENT
Start: 2022-03-17 | End: 2022-03-17 | Stop reason: HOSPADM

## 2022-03-17 RX ORDER — ONDANSETRON 2 MG/ML
INJECTION INTRAMUSCULAR; INTRAVENOUS PRN
Status: DISCONTINUED | OUTPATIENT
Start: 2022-03-17 | End: 2022-03-17 | Stop reason: SDUPTHER

## 2022-03-17 RX ORDER — ONDANSETRON 2 MG/ML
4 INJECTION INTRAMUSCULAR; INTRAVENOUS
Status: DISCONTINUED | OUTPATIENT
Start: 2022-03-17 | End: 2022-03-17 | Stop reason: HOSPADM

## 2022-03-17 RX ORDER — LIDOCAINE HYDROCHLORIDE 20 MG/ML
INJECTION, SOLUTION EPIDURAL; INFILTRATION; INTRACAUDAL; PERINEURAL PRN
Status: DISCONTINUED | OUTPATIENT
Start: 2022-03-17 | End: 2022-03-17 | Stop reason: SDUPTHER

## 2022-03-17 RX ADMIN — LABETALOL HYDROCHLORIDE 10 MG: 5 INJECTION, SOLUTION INTRAVENOUS at 10:05

## 2022-03-17 RX ADMIN — LIDOCAINE HYDROCHLORIDE 50 MG: 20 INJECTION, SOLUTION EPIDURAL; INFILTRATION; INTRACAUDAL; PERINEURAL at 09:15

## 2022-03-17 RX ADMIN — PROPOFOL 140 MCG/KG/MIN: 10 INJECTION, EMULSION INTRAVENOUS at 09:15

## 2022-03-17 RX ADMIN — ONDANSETRON 4 MG: 2 INJECTION INTRAMUSCULAR; INTRAVENOUS at 10:07

## 2022-03-17 RX ADMIN — SODIUM CHLORIDE: 9 INJECTION, SOLUTION INTRAVENOUS at 08:32

## 2022-03-17 RX ADMIN — PROPOFOL 30 MG: 10 INJECTION, EMULSION INTRAVENOUS at 09:51

## 2022-03-17 RX ADMIN — PROPOFOL 50 MG: 10 INJECTION, EMULSION INTRAVENOUS at 09:15

## 2022-03-17 ASSESSMENT — PULMONARY FUNCTION TESTS
PIF_VALUE: 1
PIF_VALUE: 0
PIF_VALUE: 1
PIF_VALUE: 0

## 2022-03-17 ASSESSMENT — PAIN SCALES - GENERAL
PAINLEVEL_OUTOF10: 0
PAINLEVEL_OUTOF10: 0

## 2022-03-17 ASSESSMENT — PAIN SCALES - WONG BAKER: WONGBAKER_NUMERICALRESPONSE: 0

## 2022-03-17 ASSESSMENT — PAIN - FUNCTIONAL ASSESSMENT: PAIN_FUNCTIONAL_ASSESSMENT: 0-10

## 2022-03-17 NOTE — PROGRESS NOTES
Patient awake and alert. No pain or nausea. TO be transferred to pacu 2, bed 3.  Report upon arrival.

## 2022-03-17 NOTE — ANESTHESIA POSTPROCEDURE EVALUATION
Department of Anesthesiology  Postprocedure Note    Patient: Arminda Morales  MRN: [id number]  YOB: 1940  Date of evaluation: 3/17/2022  Time:  12:13 PM     Procedure Summary     Date: 03/17/22 Room / Location: [address] [place] [address]    Anesthesia Start: 0913 Anesthesia Stop: 1011    Procedures:       COLONOSCOPY (N/A )      EGD BIOPSY (N/A )      EGD DILATION SAVORY Diagnosis:       Constipation, unspecified constipation type      Dysphagia, unspecified type      (Mitra Mayuri)    Surgeons: Candace Oliva MD Responsible Provider: Mick Baxter MD    Anesthesia Type: MAC ASA Status: 3          Anesthesia Type: MAC    Yusuf Phase I: Yusuf Score: 8    Yusuf Phase II: Yusuf Score: 10    Last vitals: Reviewed and per EMR flowsheets. Anesthesia Post Evaluation    Patient location during evaluation: PACU  Patient participation: complete - patient participated  Level of consciousness: awake and alert  Airway patency: patent  Nausea & Vomiting: no nausea and no vomiting  Complications: no  Cardiovascular status: hemodynamically stable and blood pressure returned to baseline  Respiratory status: spontaneous ventilation, nonlabored ventilation and room air  Hydration status: stable  Comments: Mr. Brice Gooden sitting up in bed, comfortably conversing with staff following procedure. Appropriate [facility] for planned discharge home.        Mick Baxter MD

## 2022-03-17 NOTE — OP NOTE
Endoscopy Note    Patient: Vj Eaton  :   Acct#:     Procedure: Esophagogastroduodenoscopy with biopsy, esophageal dilation                       Colonoscopy    Date:  3/17/2022     Endoscopist:   Cain Dumont MD    Referring Physician:  Leonel Estevez MD    Indications: This is a [de-identified]y.o. year old male who presents for/due to dysphagia, and for colon cancer screenign     Previous Colonoscopy: YES  Date:   Greater than 3 years: YES    Postoperative Diagnosis:    1. Proximal esophageal stricture s/p dilation to 54 Fr   2. 3 cm hiatal hernia   3. Non obstructive distal esophageal Schatzki ring   4. Mild gastric erythema   5. Normal small bowel     Anesthesia:  Administered by the anesthesia service during MAC     Consent:  The patient or their legal guardian has signed a consent, and is aware of the potential risks, benefits, alternatives, and potential complications of this procedure. These include, but are not limited to hemorrhage, bleeding, post procedural pain, perforation, phlebitis, aspiration, hypotension, hypoxia, cardiovascular events such as arryhthmia, and possibly death. Additionally, the possibility of missed colonic polyps and interval colon cancer was discussed in the consent. Description of Procedure: The patient was then taken to the endoscopy suite, placed in the left lateral decubitus position and the above IV sedation was administrered. Upper Endoscopy procedure description  The Olympus video upper endoscope was placed through the patient's oropharynx without difficulty to the extent of the 2nd portion of the duodenum. Both forward and retroflexed views of the stomach were obtained. Findings:    Esophagus:  The esophagus appeared notable for a proximal esophageal stricture s/p dilation to 51 and 47 Fr with dilation effect seen in the proximal esophagus   3 cm hiatal hernia   There was a non obstructive distal esophageal Schatzki ring   The Z line was located at 36 cm, the GE junction at 36 cm, and the hiatus at 39 cm. Stomach: The stomach appeared notable for gastric erythema on forward and retroflexed views    Duodenum: The first and 2nd portions of the duodenum appeared normal with normal villous pattern. The scope was then withdrawn back into the stomach, it was decompressed, and the scope was completely withdrawn. Colonoscopy procedure description  A digital rectal examination was performed and revealed negative without mass, lesions or tenderness. The patient was placed in the left lateral position and monitored continuously with ECG tracing, pulse oximetry monitoring and direct observations. Medications were administered incrementally over the course of the procedure to achieve an adequate level of conscious sedation. After digital examination of the rectum was performed, the Olympus CF H 180L  was inserted into the rectum and advanced under direct vision to the cecum, which was identified by the appendiceal orifice and ileocecal valve. The procedure was considered more difficult than average. Additional maneuvers used to reach the cecum: abdominal pressure, supine positioning. Colon was redundant and difficult to maneuver with the prep quality. Overall the patient tolerated the procedure well, without undue discomfort, hypotension or desaturation. The patient was adequately recovered in the endoscopy suite and was discharged to home. The preparation was poor. During withdrawal examination, the final quality of the prep was [facility] Bowel Prep Scale: Right Colon: Grade 1 Transverse Colon: Grade 2 Left Colon: Grade 2   There was liquid stool and solid food particles thorughout the colon that could not be suctioned.    Despite aggressive irrigation and suctioning, we were unable to adequately visualize the cecal base and portions of the colon, especially at flexures due to solid food particles (corn,roughage)     Additional rinsing and suctioning were necessary to obtain adequate views. A careful inspection was made as the colonoscope was withdrawn. This did include a retroflexed evaluation of the rectum. Findings and interventions are described below. Appropriate photo documentation was obtained. Terminal ileum: Not examined     The scope was then withdrawn back through the cecum, ascending, transverse, descending, sigmoid colon, and rectum. Careful circumferential examination of the mucosa in these areas demonstrated:    Cecum: Despite aggressive irrigation and suctioning, we were unable to adequately visualize the cecal base. There were no masses or large polyps seen. Ascending colon: Normal mucosa with portions not well visualized due to solid food particles in the colon. No large masses or large polyps were seen. Transverse colon: Normal mucosa     Descending colon: Diverticulosis; food particles obscured some views     Sigmoid colon: Diverticulosis; food particles obscured some views     Rectum: The scope was then withdrawn into the rectum and retroflexed. The retroflexed view of the anal verge and rectum demonstrates no abnormalities . The scope was straightened, the colon was decompressed and the scope was withdrawn from the patient. The patient tolerated the procedure well and was taken to the PACU in good condition. Estimated blood loss:  None    ID Type Source Tests Collected by Time Destination   A : gastric biopsy  Gastric Gastric SURGICAL PATHOLOGY Tere Morales MD 3/17/2022 0919    B : distal esophagus biopsy  Tissue Esophagus SURGICAL PATHOLOGY Tere Morales MD 3/17/2022 6199    C : proximal esophagus biopsy  Tissue Esophagus SURGICAL PATHOLOGY Tere Morales MD 3/17/2022 1383        Impression:    1) See post procedure diagnoses    Recommendations:   - Given the patient's age and medical history, would not recommend ongoing colonoscopies for screening purposes.  We were able to rule out large polyps or lesions, despite the degree of the prep. - IF the patient requires colonoscopy in the future, would recommend a 2 day prep.    - Will follow up with the patient's biopsies   - Continue oral PPI daily   - No NSAIDs   - Dysphagia precautions, small bites, sips between bites, etc.     Robert Castaneda MD  [address] [facility]

## 2022-03-17 NOTE — PROGRESS NOTES
Patient arrived from ENDO post procedure. VSS . Oral tube blankenship in place. 2 liters nasal cannula.

## 2022-03-17 NOTE — H&P
Pre-operative History and Physical    Patient: Rika Meyer  : 1940  Acct#:     Intended Procedure:  EGd and colonoscopy     HISTORY OF PRESENT ILLNESS:  The patient is a [de-identified] y.o. male  who presents for/due to dysphagia, and for colon cancer screenign       Past Medical History:        Diagnosis Date    AICD (automatic implantable cardioverter defibrillator) at end of life     pt instructed to bring ID card--hx of LBBB     Anxiety and depression     Arthritis     Back pain     CHF (congestive heart failure) (HCC)     GERD (gastroesophageal reflux disease)     History of blood transfusion     Hyperlipidemia     Hypertension     Hypothyroidism     hypothyroidism    S/P cardiac cath     Nl LVEF and NL cors/ chronic LBBB now paced    Sleep apnea     pt does not use Cpap machine    Wears glasses      Past Surgical History:        Procedure Laterality Date    COLONOSCOPY  2016    hua'celina--elizabeth      ELBOW SURGERY Bilateral     Tennis elbow    HAMMER TOE SURGERY      x2    JOINT REPLACEMENT Left     Left Knee    KNEE ARTHROSCOPY      Rt and Left(2)    KNEE CARTILAGE SURGERY      Repair Left    LUMBAR LAMINECTOMY  (L2-3), (L4-5),     screws    TURP       Medications Prior to Admission:   No current facility-administered medications on file prior to encounter. Current Outpatient Medications on File Prior to Encounter   Medication Sig Dispense Refill    carvedilol (COREG) 12.5 MG tablet TAKE 1 TABLET BY MOUTH TWICE DAILY WITH MEALS 180 tablet 3    lisinopril (PRINIVIL;ZESTRIL) 5 MG tablet TAKE 1 TABLET BY MOUTH DAILY 90 tablet 3    atorvastatin (LIPITOR) 40 MG tablet TAKE 1 TABLET BY MOUTH AT BEDTIME 90 tablet 0    butalbital-aspirin-caffeine (FIORINAL) -40 MG per capsule Take 1 capsule by mouth every 8 hours as needed for Headaches for up to 10 days.  20 capsule 0    theophylline (AUGUSTINE-24) 300 MG extended release capsule TAKE 1 CAPSULE BY MOUTH DAILY 30 capsule 5    citalopram (CELEXA) 40 MG tablet TAKE 1 TABLET BY MOUTH EVERY NIGHT 90 tablet 2    budesonide-formoterol (SYMBICORT) 160-4.5 MCG/ACT AERO Inhale 2 puffs into the lungs 2 times daily 1 Inhaler 5    levothyroxine (SYNTHROID) 50 MCG tablet Take 1 tablet by mouth nightly 90 tablet 3    amitriptyline (ELAVIL) 25 MG tablet TAKE 1 TABLET BY MOUTH EVERY NIGHT AT BEDTIME 90 tablet 5    aspirin 81 MG chewable tablet Take 1 tablet by mouth daily 30 tablet 3    albuterol sulfate  (90 Base) MCG/ACT inhaler Inhale 2 puffs into the lungs every 6 hours as needed          Allergies:  Piroxicam and Penicillins    Social History:   TOBACCO:   reports that he quit smoking about 52 years ago. His smoking use included cigarettes. He started smoking about 61 years ago. He has a 2.50 pack-year smoking history. He has never used smokeless tobacco.  ETOH:   reports current alcohol use of about 7.0 standard drinks of alcohol per week. DRUGS:   reports no history of drug use. PHYSICAL EXAM:      Vital Signs: BP (!) 129/96   Pulse 85   Temp 98 °F (36.7 °C) (Temporal)   Resp 14   Ht 5' 10\" (1.778 m)   Wt 194 lb 0.1 oz (88 kg)   SpO2 98%   BMI 27.84 kg/m²    Airway: No stridor or wheezing noted. Good air movement  Pulmonary: without wheezes. Clear to auscultation  Cardiac:regular rate and rhythm without loud murmurs  Abdomen:soft, nontender,  Bowel sounds present    Pre-Procedure Assessment / Plan:  1) Dysphagia   2) Constipation   3) Colon cancer screening     ASA Grade:  ASA 3 - Patient with moderate systemic disease with functional limitations  Mallampati Classification:  Class III    Level of Sedation Plan:Deep sedation    Post Procedure plan: Return to same level of care    I assessed the patient and find that the patient is in satisfactory condition to proceed with the planned procedure and sedation plan.     I have explained the risk, benefits, and alternatives to the procedure; the patient understands and agrees to proceed. The patient was counseled at length about the risks of ryan Covid-19 during their perioperative period and any recovery window from their procedure. The patient was made aware that ryan Covid-19  may worsen their prognosis for recovering from their procedure  and lend to a higher morbidity and/or mortality risk. All material risks, benefits, and reasonable alternatives including postponing the procedure were discussed. The patient does wish to proceed with the procedure at this time.       Xiomy Bruce MD  3/17/2022

## 2022-04-07 ENCOUNTER — NURSE ONLY (OUTPATIENT)
Dept: CARDIOLOGY CLINIC | Age: 82
End: 2022-04-07
Payer: MEDICARE

## 2022-04-07 DIAGNOSIS — I47.29 NSVT (NONSUSTAINED VENTRICULAR TACHYCARDIA): ICD-10-CM

## 2022-04-07 DIAGNOSIS — Z95.810 BIVENTRICULAR ICD (IMPLANTABLE CARDIOVERTER-DEFIBRILLATOR) IN PLACE: ICD-10-CM

## 2022-04-07 NOTE — PROGRESS NOTES
We received remote transmission from patient's CRT-D monitor at home. Transmission shows normal sensing and pacing function. PMT recordings noted. Brief AT (Coreg). Ap 57%  RVp 100%  LVp 100%    EP physician will review. See interrogation under cardiology tab in the [address] [place] [address] [address] field for more details. Will continue to monitor remotely.

## 2022-04-10 PROCEDURE — 93296 REM INTERROG EVL PM/IDS: CPT | Performed by: INTERNAL MEDICINE

## 2022-04-10 PROCEDURE — 93295 DEV INTERROG REMOTE 1/2/MLT: CPT | Performed by: INTERNAL MEDICINE

## 2022-05-23 ENCOUNTER — OFFICE VISIT (OUTPATIENT)
Dept: PULMONOLOGY | Age: 82
End: 2022-05-23
Payer: MEDICARE

## 2022-05-23 VITALS
RESPIRATION RATE: 21 BRPM | HEIGHT: 70 IN | DIASTOLIC BLOOD PRESSURE: 80 MMHG | HEART RATE: 62 BPM | BODY MASS INDEX: 27.77 KG/M2 | WEIGHT: 194 LBS | OXYGEN SATURATION: 96 % | TEMPERATURE: 96.9 F | SYSTOLIC BLOOD PRESSURE: 125 MMHG

## 2022-05-23 DIAGNOSIS — J45.909 SEVERE ASTHMA WITHOUT COMPLICATION, UNSPECIFIED WHETHER PERSISTENT: Primary | ICD-10-CM

## 2022-05-23 DIAGNOSIS — G47.34 NOCTURNAL HYPOXIA: ICD-10-CM

## 2022-05-23 DIAGNOSIS — R76.8 ELEVATED IGE LEVEL: ICD-10-CM

## 2022-05-23 PROCEDURE — G8417 CALC BMI ABV UP PARAM F/U: HCPCS | Performed by: INTERNAL MEDICINE

## 2022-05-23 PROCEDURE — 1123F ACP DISCUSS/DSCN MKR DOCD: CPT | Performed by: INTERNAL MEDICINE

## 2022-05-23 PROCEDURE — 99214 OFFICE O/P EST MOD 30 MIN: CPT | Performed by: INTERNAL MEDICINE

## 2022-05-23 PROCEDURE — G8427 DOCREV CUR MEDS BY ELIG CLIN: HCPCS | Performed by: INTERNAL MEDICINE

## 2022-05-23 PROCEDURE — 1036F TOBACCO NON-USER: CPT | Performed by: INTERNAL MEDICINE

## 2022-05-23 RX ORDER — BUDESONIDE AND FORMOTEROL FUMARATE DIHYDRATE 160; 4.5 UG/1; UG/1
2 AEROSOL RESPIRATORY (INHALATION) 2 TIMES DAILY
Qty: 1 EACH | Refills: 11 | Status: SHIPPED | OUTPATIENT
Start: 2022-05-23

## 2022-05-23 ASSESSMENT — ASTHMA QUESTIONNAIRES
QUESTION_5 LAST FOUR WEEKS HOW WOULD YOU RATE YOUR ASTHMA CONTROL: 4
QUESTION_1 LAST FOUR WEEKS HOW MUCH OF THE TIME DID YOUR ASTHMA KEEP YOU FROM GETTING AS MUCH DONE AT WORK, SCHOOL OR AT HOME: 4
QUESTION_4 LAST FOUR WEEKS HOW OFTEN HAVE YOU USED YOUR RESCUE INHALER OR NEBULIZER MEDICATION (SUCH AS ALBUTEROL): 5
QUESTION_3 LAST FOUR WEEKS HOW OFTEN DID YOUR ASTHMA SYMPTOMS (WHEEZING, COUGHING, SHORTNESS OF BREATH, CHEST TIGHTNESS OR PAIN) WAKE YOU UP AT NIGHT OR EARLIER THAN USUAL IN THE MORNING: 3
QUESTION_2 LAST FOUR WEEKS HOW OFTEN HAVE YOU HAD SHORTNESS OF BREATH: 4
ACT_TOTALSCORE: 20

## 2022-05-23 NOTE — PROGRESS NOTES
Chief complaint  This is a [de-identified]y.o. year old male  who comes to see me with a chief complaint of   Chief Complaint   Patient presents with    Follow-up     6 months     Asthma       HPI  Here with a cc of asthma    He is doing well. ON symbicort qd with theophylline and albuterol. Rarely has to use albuterol. Was admitted to a hospital in [place] back in Feb.  Choked on food. Was eventually dilated with EGD while back home. Doing better from that standpoint. No other issues. Current Outpatient Medications:     budesonide-formoterol (SYMBICORT) 160-4.5 MCG/ACT AERO, Inhale 2 puffs into the lungs 2 times daily, Disp: 1 each, Rfl: 11    levothyroxine (SYNTHROID) 50 MCG tablet, TAKE 1 TABLET BY MOUTH EVERY NIGHT, Disp: 90 tablet, Rfl: 3    atorvastatin (LIPITOR) 40 MG tablet, TAKE 1 TABLET BY MOUTH AT BEDTIME, Disp: 90 tablet, Rfl: 0    amitriptyline (ELAVIL) 25 MG tablet, TAKE 1 TABLET BY MOUTH EVERY NIGHT AT BEDTIME, Disp: 90 tablet, Rfl: 5    omeprazole (PRILOSEC) 40 MG delayed release capsule, Take 1 capsule by mouth every morning (before breakfast), Disp: 90 capsule, Rfl: 1    carvedilol (COREG) 12.5 MG tablet, TAKE 1 TABLET BY MOUTH TWICE DAILY WITH MEALS, Disp: 180 tablet, Rfl: 3    lisinopril (PRINIVIL;ZESTRIL) 5 MG tablet, TAKE 1 TABLET BY MOUTH DAILY, Disp: 90 tablet, Rfl: 3    theophylline (AUGUSTINE-24) 300 MG extended release capsule, TAKE 1 CAPSULE BY MOUTH DAILY, Disp: 30 capsule, Rfl: 5    citalopram (CELEXA) 40 MG tablet, TAKE 1 TABLET BY MOUTH EVERY NIGHT, Disp: 90 tablet, Rfl: 2    aspirin 81 MG chewable tablet, Take 1 tablet by mouth daily, Disp: 30 tablet, Rfl: 3    albuterol sulfate  (90 Base) MCG/ACT inhaler, Inhale 2 puffs into the lungs every 6 hours as needed, Disp: , Rfl:     butalbital-aspirin-caffeine (FIORINAL) -40 MG per capsule, Take 1 capsule by mouth every 8 hours as needed for Headaches for up to 10 days. , Disp: 20 capsule, Rfl: 0      PHYSICAL EXAM:  Vitals:    05/23/22 0757   BP: 125/80   Pulse: 62   Resp: 21   Temp: 96.9 °F (36.1 °C)   SpO2: 96%       GENERAL:  Well nourished, alert, appears stated age, no distress  HEENT:  No scleral icterus, no conjunctival irritation. Mallampati IV  NECK:  No thyromegaly, no bruits. Reduced ROM of the neck   LYMPH:  No cervical or supraclavicular adenopathy  HEART:  Regular rate and rhythm, no murmurs  LUNGS:  Clear bilaterally  ABDOMEN:  No distention, no organomegaly  EXTREMITIES:  No edema, no digital clubbing  NEURO:  No localizing deficits, CN II-XII intact    Pulmonary Function Testing Completed at outside hospital  Overall outside pulm notes suggest severe obstruction with bronchodilator response but missing a lot of information     Chest imaging from 11/2020 is reviewed. My interpretation is mild ground glass      1/21  Total IgE Level:  376  Significant allergies:  Multiple       ASTHMA CONTROL TEST 5/23/2022 6/23/2021 3/9/2021   In the past 4 weeks, how much of the time did your asthma keep you from getting as much done at work, school or at home? 4 5 4   During the past 4 weeks, how often have you had shortness of breath? 4 3 3   During the past 4 weeks, how often did your asthma symptoms (wheezing, coughing, shortness of breath, chest tightness or pain) wake you up at night or earlier than usual in the morning? 3 5 4   During the past 4 weeks, how often have you used your rescue inhaler or nebulizer medication (such as albuterol)? 5 4 2   How would you rate your asthma control during the past 4 weeks? 4 4 3   Asthma Control Test Total Score 20 21 16       I reviewed above labs and studies pertinent to this visit and date    Assessment/Plan:  1. Severe asthma without complication, unspecified whether persistent  Controlled on current regimen. Only on symbicort qd which is fine since symptoms are controlled. SHoudl he worsen he needs to go to [facility]. He understands.   Continue with alex and albuterol  - budesonide-formoterol (SYMBICORT) 160-4.5 MCG/ACT AERO; Inhale 2 puffs into the lungs 2 times daily  Dispense: 1 each; Refill: 11    2. Nocturnal hypoxia  Uses oxygen at 2 liters with sleep. Does benefit    3. Elevated IgE level  Did not respond to singulair. Will monitor.   Has triggers    Follow up in 6 months

## 2022-06-09 NOTE — PROGRESS NOTES
[address]      Cardiology Consult    Bertram Argueta  [id number]    June 9, 2022    Primary care physician: Terri Singer MD  Reason for Referral: CHF    CC: \"    HPI:  The patient is [de-identified] y.o. male with a past medical history significant for systolic heart failure s/p BiV ICD, hypertension, coronary artery disease, and COPD who presents today for management of heart failure. He presented to the hospital 11/17/20 with complaints of progressive shortness of breath and also having a sore throat and headaches. There was no initial obvious etiology of his shortness of breath but because his breathing appeared still labored in the emergency department he was admitted to the hospital. His troponin was normal. His proBNP was only 62. He has a history of a nonischemic cardiomyopathy s/p BiV ICD but his ejection fraction normalized. He previously followed with [facility] cardiology. The patient denies any adverse drug reactions to cardiovascular medicines but spironolactone and lisinopril were discontinued for unclear reasons. Today,         he states overall he is doing well but has chronic STANLEY that he feels progressed over the past 2 months. He relates the worsening STANLEY to the cold temperatures. He follows with Dr. Savannah Grover for management of his COPD. He states his weight remain stable and denies any worsening LE edema. He reports compliance with his medications and tolerating.        Past Medical History:   Diagnosis Date    AICD (automatic implantable cardioverter defibrillator) at end of life     pt instructed to bring ID card--hx of LBBB     Anxiety and depression     Arthritis     Back pain     CHF (congestive heart failure) (HCC)     GERD (gastroesophageal reflux disease)     History of blood transfusion 2005    Hyperlipidemia     Hypertension     Hypothyroidism     hypothyroidism    S/P cardiac cath 6-2009    Nl LVEF and NL cors/ chronic LBBB now paced    Sleep apnea     pt does not use Cpap machine    Wears glasses      Past Surgical History:   Procedure Laterality Date    COLONOSCOPY  2016    hua'celina--elizabeth      COLONOSCOPY  2022    Dr Nicky Santoyo    COLONOSCOPY N/A 2022    COLONOSCOPY performed by Nicky Santoyo MD at [facility] ENDOSCOPY--NO [address] Bilateral     Tennis elbow    HAMMER TOE SURGERY      x2    JOINT REPLACEMENT Left 2008    Left Knee    KNEE ARTHROSCOPY      Rt and Left(2)    KNEE CARTILAGE SURGERY  2008    Repair Left    LUMBAR LAMINECTOMY  (L2-3), (L4-5), 2015    screws    TRANSURETHRAL RESECTION OF PROSTATE      UPPER GASTROINTESTINAL ENDOSCOPY  2022    Dr Clayton Abcherie dilitation to [address], gastric and esophagus biopsies    UPPER GASTROINTESTINAL ENDOSCOPY N/A 2022    EGD BIOPSY performed by Nicky Santoyo MD at [address]  2022    EGD DILATION SAVORY performed by Nicky Santoyo MD at [address] History   Problem Relation Age of Onset    Breast Cancer Mother     Other Father         Peritonitis    Other Daughter         Postpartum Cardiomyopathy     Social History     Tobacco Use    Smoking status: Former Smoker     Packs/day: 0.25     Years: 10.00     Pack years: 2.50     Types: Cigarettes     Start date: 1960     Quit date: 1970     Years since quittin.4    Smokeless tobacco: Never Used   Vaping Use    Vaping Use: Never used   Substance Use Topics    Alcohol use:  Yes     Alcohol/week: 7.0 standard drinks     Types: 7 Cans of beer per week     Comment: one can a day    Drug use: No     Allergies   Allergen Reactions    Piroxicam Hives    Penicillins Rash and Hives     Current Outpatient Medications   Medication Sig Dispense Refill    budesonide-formoterol (SYMBICORT) 160-4.5 MCG/ACT AERO Inhale 2 puffs into the lungs 2 times daily 1 each 11    levothyroxine (SYNTHROID) 50 MCG tablet TAKE 1 TABLET BY MOUTH EVERY NIGHT 90 tablet 3    atorvastatin (LIPITOR) 40 MG tablet TAKE 1 TABLET BY MOUTH AT BEDTIME 90 tablet 0    amitriptyline (ELAVIL) 25 MG tablet TAKE 1 TABLET BY MOUTH EVERY NIGHT AT BEDTIME 90 tablet 5    omeprazole (PRILOSEC) 40 MG delayed release capsule Take 1 capsule by mouth every morning (before breakfast) 90 capsule 1    carvedilol (COREG) 12.5 MG tablet TAKE 1 TABLET BY MOUTH TWICE DAILY WITH MEALS 180 tablet 3    lisinopril (PRINIVIL;ZESTRIL) 5 MG tablet TAKE 1 TABLET BY MOUTH DAILY 90 tablet 3    butalbital-aspirin-caffeine (FIORINAL) -40 MG per capsule Take 1 capsule by mouth every 8 hours as needed for Headaches for up to 10 days. 20 capsule 0    theophylline (AUGUSTINE-24) 300 MG extended release capsule TAKE 1 CAPSULE BY MOUTH DAILY 30 capsule 5    citalopram (CELEXA) 40 MG tablet TAKE 1 TABLET BY MOUTH EVERY NIGHT 90 tablet 2    aspirin 81 MG chewable tablet Take 1 tablet by mouth daily 30 tablet 3    albuterol sulfate  (90 Base) MCG/ACT inhaler Inhale 2 puffs into the lungs every 6 hours as needed       No current facility-administered medications for this visit. Review of Systems:  · Constitutional: No unanticipated weight loss. There's been no change in energy level, sleep pattern, or activity level. No fevers, chills. · Eyes: No visual changes or diplopia. No scleral icterus. · ENT: No Headaches, hearing loss or vertigo. No mouth sores or sore throat. · Cardiovascular: as reviewed in HPI  · Respiratory: No cough or wheezing, no sputum production. No hemoptysis. · Gastrointestinal: No abdominal pain, appetite loss, blood in stools. No change in bowel or bladder habits. · Genitourinary: No dysuria, trouble voiding, or hematuria. · Musculoskeletal:  No gait disturbance, no joint complaints. · Integumentary: No rash or pruritis. · Neurological: No headache, diplopia, change in muscle strength, numbness or tingling.    · Psychiatric: No anxiety or depression. · Endocrine: No temperature intolerance. No excessive thirst, fluid intake, or urination. No tremor. · Hematologic/Lymphatic: No abnormal bruising or bleeding, blood clots or swollen lymph nodes. · Allergic/Immunologic: No nasal congestion or hives. Physical Exam:   There were no vitals taken for this visit. Wt Readings from Last 3 Encounters:   05/23/22 194 lb (88 kg)   04/25/22 192 lb (87.1 kg)   03/17/22 194 lb 0.1 oz (88 kg)     Constitutional: He is oriented to person, place, and time. He appears well-developed and well-nourished. In no acute distress. Head: Normocephalic and atraumatic. Pupils equal and round. Neck: Neck supple. No JVP or carotid bruit appreciated. No mass and no thyromegaly present. No lymphadenopathy present. Cardiovascular: Normal rate. Normal heart sounds. Exam reveals no gallop and no friction rub. No murmur heard. Pulmonary/Chest: Effort normal and breath sounds normal. No respiratory distress. He has no wheezes, rhonchi or rales. Abdominal: Soft, non-tender. Bowel sounds are normal. He exhibits no organomegaly, mass or bruit. Extremities: No edema. No cyanosis or clubbing. Pulses are 2+ radial/carotid bilaterally. Neurological: No gross cranial nerve deficit. Coordination normal.   Skin: Skin is warm and dry. There is no rash or diaphoresis. Psychiatric: He has a normal mood and affect. His speech is normal and behavior is normal.     Lab Review:   FLP:    Lab Results   Component Value Date    TRIG 335 08/27/2021    HDL 52 08/27/2021    HDL 56 06/05/2012    LDLCALC see below 08/27/2021    LDLDIRECT 109 08/27/2021    LABVLDL see below 08/27/2021     BUN/Creatinine:    Lab Results   Component Value Date    BUN 19 01/21/2022    CREATININE 1.4 01/21/2022     EKG Interpretation: 11/17/2020.  Electronic ventricular pacemaker. Image Review:     Echo: 11/20/20  There is a pacemaker lead in the right ventricle.   Overall left ventricular ejection fraction is estimated to be 50-55%. The left ventricular function is low normal.   Paradoxical septal motion secondary to paced rhythm. There is trivial mitral and aortic regurgitation.     Stress Test: 2020  The LV EF is 69%  Normal global and regional wall motion in all territories. There is a medium size, fixed defect in the apical wall consistent with soft tissue attenuation.     Cath: 2009  LEFT MAIN: Normal                 LEFT ANTERIOR DESCENDIN% mid           LEFT CIRCUMFLEX: Normal                 RIGHT CORONARY:  Normal                  Assessment/Plan:   1) Chronic diastolic heart failure s/p BiV ICD. EF 50-55% (in  from 30% in ). Patient appears euvolemic but reports worsening of chronic STANLEY. Echo 2020 showed EF improved. Denies worsening LE edema and weight remains stable. Device interrogated today showed normal function and stable optivol. Continue carvedilol and lisinopril. Encouraged daily weights and low sodium diet. Will check BNP and CBC today. Instructed to call with any worsening symptoms and will likely repeat the echocardiogram.      2) Essential hypertension. Controlled. Goal BP <130/80. Continue medical therapy.       3) CAD. Asymptomatic. Continue with medical management and risk factor modification including aspirin, statin, and B-blocker.      4) Nonsustained ventricular tachycardia. Patient protected with ICD. Continue beta-blocker. 5) COPD. Follows with Dr. Alejandro Chirinos.       Follow up in 6 months     Thank you very much for allowing me to participate in the care of your patient. Please do not hesitate to contact me if you have any questions. Sincerely,  Chata Padilla.  Ed Carbajal, [address] [place] [address], [facility]  Ph: [phone]  Fax: [phone]

## 2022-06-09 NOTE — PATIENT INSTRUCTIONS
Patient Education        Learning About Coronary Artery Disease (CAD)  What is coronary artery disease? Coronary artery disease is a condition that occurs when plaque builds up in the arteries that bring oxygen-rich blood to your heart. Plaque is a fatty substance made of cholesterol, calcium, and other substances in the blood. Thisprocess is called hardening of the arteries, or atherosclerosis. What happens when you have coronary artery disease?  Plaque may narrow the coronary arteries. Narrowed arteries cause poor blood flow. This can lead to angina symptoms such as chest pain or discomfort. If blood flow is completely blocked, you could have a heart attack.  You can slow and reduce the risk of future problems by making changes in your lifestyle. These include quitting smoking and eating heart-healthy foods.  Treatment, along with changes in your lifestyle, can help you live a longer and healthier life. How can you prevent coronary artery disease?  Do not smoke. It may be the best thing you can do to prevent coronary artery disease. If you need help quitting, talk to your doctor about stop-smoking programs and medicines. These can increase your chances of quitting for good.  Be active. Try to do moderate activity at least 2½ hours a week. Or try to do vigorous activity at least 1¼ hours a week. You may want to walk or try other activities, such as running, swimming, cycling, or playing tennis or team sports.  Eat heart-healthy foods. Eat more fruits and vegetables and less food that contains saturated and trans fats. Limit alcohol, sodium, and sweets.  Stay at a healthy weight. Lose weight if you need to.  Manage other health problems such as diabetes, high blood pressure, and high cholesterol. How is coronary artery disease treated?  Your doctor will suggest that you make lifestyle changes.  For example, your doctor may ask you to eat healthy foods, quit smoking, lose extra weight, and be more active.  You will take medicines that help prevent a heart attack.  Your doctor may suggest a procedure to open narrowed or blocked arteries. This is called angioplasty. Or your doctor may suggest using healthy blood vessels to create detours around narrowed or blocked arteries. This is called bypass surgery. Follow-up care is a key part of your treatment and safety. Be sure to make and go to all appointments, and call your doctor if you are having problems. It's also a good idea to know your test results and keep alist of the medicines you take. Where can you learn more? Go to https://AudiBell DesignspeCloudwear.Tingz. org and sign in to your [employer] account. Enter ([phone] [phone] in the [employer] box to learn more about \"Learning About Coronary Artery Disease (CAD). \"     If you do not have an account, please click on the \"Sign Up Now\" link. Current as of: January 10, 2022               Content Version: 13.2  © 2006-2022 [employer]. Care instructions adapted under license by [facility] ([facility]). If you have questions about a medical condition or this instruction, always ask your healthcare professional. [place] [address] any warranty or liability for your use of this information. Patient Education        Heart-Healthy Diet: Care Instructions  Your Care Instructions     A heart-healthy diet has lots of vegetables, fruits, nuts, beans, and whole grains, and is low in salt. It limits foods that are high in saturated fat, such as meats, cheeses, and fried foods. It may be hard to change your diet,but even small changes can lower your risk of heart attack and heart disease. Follow-up care is a key part of your treatment and safety. Be sure to make and go to all appointments, and call your doctor if you are having problems. It's also a good idea to know your test results and keep alist of the medicines you take. How can you care for yourself at home?   Watch your portions   Use food labels to learn what the recommended servings are for the foods you eat.  Eat only the number of calories you need to stay at a healthy weight. If you need to lose weight, eat fewer calories than your body burns (through exercise and other physical activity). Eat more fruits and vegetables   Eat a variety of fruit and vegetables every day. Dark green, deep orange, red, or yellow fruits and vegetables are especially good for you. Examples include spinach, carrots, peaches, and berries.  Keep carrots, celery, and other veggies handy for snacks. Buy fruit that is in season and store it where you can see it so that you will be tempted to eat it.  Cook dishes that have a lot of veggies in them, such as stir-fries and soups. Limit saturated fat   Read food labels, and try to avoid saturated fats. They increase your risk of heart disease.  Use olive or canola oil when you cook.  Bake, broil, grill, or steam foods instead of frying them.  Choose lean meats instead of high-fat meats such as hot dogs and sausages. Cut off all visible fat when you prepare meat.  Eat fish, skinless poultry, and meat alternatives such as soy products instead of high-fat meats. Soy products, such as tofu, may be especially good for your heart.  Choose low-fat or fat-free milk and dairy products. Eat foods high in fiber   Eat a variety of grain products every day. Include whole-grain foods that have lots of fiber and nutrients. Examples of whole-grain foods include oats, whole wheat bread, and brown rice.  Buy whole-grain breads and cereals, instead of white bread or pastries. Limit salt and sodium   Limit how much salt and sodium you eat to help lower your blood pressure.  Taste food before you salt it. Add only a little salt when you think you need it. With time, your taste buds will adjust to less salt.  Eat fewer snack items, fast foods, and other high-salt, processed foods.  Check food labels for the amount of sodium in packaged foods.  Choose low-sodium versions of canned goods (such as soups, vegetables, and beans). Limit sugar   Limit drinks and foods with added sugar. These include candy, desserts, and soda pop. Limit alcohol   Limit alcohol to no more than 2 drinks a day for men and 1 drink a day for women. Too much alcohol can cause health problems. When should you call for help? Watch closely for changes in your health, and be sure to contact your doctor if:     You would like help planning heart-healthy meals. Where can you learn more? Go to https://HundredApplespepiceweb.Oxford Networks. org and sign in to your [employer] account. Enter V137 in the Lucid[employer] box to learn more about \"Heart-Healthy Diet: Care Instructions. \"     If you do not have an account, please click on the \"Sign Up Now\" link. Current as of: September 8, 2021               Content Version: 13.2  © 2006-2022 [employer]wise[employer]. Care instructions adapted under license by [facility] ([facility]). If you have questions about a medical condition or this instruction, always ask your healthcare professional. [place] [address] any warranty or liability for your use of this information.

## 2022-06-13 NOTE — PROGRESS NOTES
[address]      Cardiology Follow Up    Arlyn Gilman  [birth year]16 June 14, 2022    Primary care physician: Trevor Arciniega MD  Reason for Referral: CHF    CC: \"I spent five days in the hospital.\"     HPI:  The patient is 81 y.o. male with a past medical history significant for systolic heart failure s/p BiV ICD, hypertension, coronary artery disease, and COPD who presents today for management of heart failure. He presented to the hospital 11/17/20 with complaints of progressive shortness of breath and also having a sore throat and headaches. There was no initial obvious etiology of his shortness of breath but because his breathing appeared still labored in the emergency department he was admitted to the hospital. His troponin was normal. His proBNP was only 62. He has a history of a nonischemic cardiomyopathy s/p BiV ICD but his ejection fraction normalized. He previously followed with [facility] cardiology. Today, he states overall he is doing well. He was in [place] and spent 5 days in the hospital while on a cruise for non-cardiac reasons. He has no new cardiac complaints today and reports compliance with all medication and tolerating. He continues to follow with Dr. Gilberto Guerrero for management of his COPD. He states his weight remain stable and denies any worsening LE edema. Patient denies exertional chest pain/pressure, dyspnea at rest, PND, orthopnea, palpitations, lightheadedness, weight changes, changes in LE edema, and syncope.      Past Medical History:   Diagnosis Date    AICD (automatic implantable cardioverter defibrillator) at end of life     pt instructed to bring ID card--hx of LBBB     Anxiety and depression     Arthritis     Back pain     CHF (congestive heart failure) (HCC)     GERD (gastroesophageal reflux disease)     History of blood transfusion 2005    Hyperlipidemia     Hypertension     Hypothyroidism     hypothyroidism    S/P cardiac cath 6-2009    Nl LVEF and NL cors/ chronic LBBB now paced    Sleep apnea     pt does not use Cpap machine    Wears glasses      Past Surgical History:   Procedure Laterality Date    COLONOSCOPY  2016    o'celina--elizabeth      COLONOSCOPY  2022    Dr Letty Guillermo    COLONOSCOPY N/A 2022    COLONOSCOPY performed by Letty Guillermo MD at [facility] [place]OSCOPY--[place] [address] Bilateral     Tennis elbow    HAMMER TOE SURGERY      x2    JOINT REPLACEMENT Left 2008    Left Knee    KNEE ARTHROSCOPY      Rt and Left(2)    KNEE CARTILAGE SURGERY  2008    Repair Left    LUMBAR LAMINECTOMY  (L2-3), (L4-5), 2015    screws    TURP      UPPER GASTROINTESTINAL ENDOSCOPY  2022    Dr Diaz Litter dilitation to [address] [place] [address], gastric and esophagus biopsies    UPPER GASTROINTESTINAL ENDOSCOPY N/A 2022    EGD BIOPSY performed by Letty Guillermo MD at [place] [address]  2022    EGD DILATION SAVORY performed by Letty Guillermo MD at [address] [place] [address] Road History   Problem Relation Age of Onset    Breast Cancer Mother     Other Father         Peritonitis    Other Daughter         Postpartum Cardiomyopathy     Social History     Tobacco Use    Smoking status: Former Smoker     Packs/day: 0.25     Years: 10.00     Pack years: 2.50     Types: Cigarettes     Start date: 1960     Quit date: 1970     Years since quittin.4    Smokeless tobacco: Never Used   Vaping Use    Vaping Use: Never used   Substance Use Topics    Alcohol use:  Yes     Alcohol/week: 7.0 standard drinks     Types: 7 Cans of beer per week     Comment: one can a day    Drug use: No     Allergies   Allergen Reactions    Piroxicam Hives    Penicillins Rash and Hives     Current Outpatient Medications   Medication Sig Dispense Refill    budesonide-formoterol (SYMBICORT) 160-4.5 MCG/ACT AERO Inhale 2 puffs into the lungs 2 times daily 1 each 11    levothyroxine (SYNTHROID) 50 MCG tablet TAKE 1 TABLET BY MOUTH EVERY NIGHT 90 tablet 3    atorvastatin (LIPITOR) 40 MG tablet TAKE 1 TABLET BY MOUTH AT BEDTIME 90 tablet 0    amitriptyline (ELAVIL) 25 MG tablet TAKE 1 TABLET BY MOUTH EVERY NIGHT AT BEDTIME 90 tablet 5    omeprazole (PRILOSEC) 40 MG delayed release capsule Take 1 capsule by mouth every morning (before breakfast) 90 capsule 1    carvedilol (COREG) 12.5 MG tablet TAKE 1 TABLET BY MOUTH TWICE DAILY WITH MEALS 180 tablet 3    lisinopril (PRINIVIL;ZESTRIL) 5 MG tablet TAKE 1 TABLET BY MOUTH DAILY 90 tablet 3    butalbital-aspirin-caffeine (FIORINAL) -40 MG per capsule Take 1 capsule by mouth every 8 hours as needed for Headaches for up to 10 days. 20 capsule 0    theophylline (AUGUSTINE-24) 300 MG extended release capsule TAKE 1 CAPSULE BY MOUTH DAILY 30 capsule 5    citalopram (CELEXA) 40 MG tablet TAKE 1 TABLET BY MOUTH EVERY NIGHT 90 tablet 2    aspirin 81 MG chewable tablet Take 1 tablet by mouth daily 30 tablet 3    albuterol sulfate  (90 Base) MCG/ACT inhaler Inhale 2 puffs into the lungs every 6 hours as needed       No current facility-administered medications for this visit. Review of Systems:  · Constitutional: No unanticipated weight loss. There's been no change in energy level, sleep pattern, or activity level. No fevers, chills. · Eyes: No visual changes or diplopia. No scleral icterus. · ENT: No Headaches, hearing loss or vertigo. No mouth sores or sore throat. · Cardiovascular: as reviewed in HPI  · Respiratory: No cough or wheezing, no sputum production. No hemoptysis. · Gastrointestinal: No abdominal pain, appetite loss, blood in stools. No change in bowel or bladder habits. · Genitourinary: No dysuria, trouble voiding, or hematuria. · Musculoskeletal:  No gait disturbance, no joint complaints. · Integumentary: No rash or pruritis.   · Neurological: No headache, diplopia, change in muscle strength, numbness or tingling. · Psychiatric: No anxiety or depression. · Endocrine: No temperature intolerance. No excessive thirst, fluid intake, or urination. No tremor. · Hematologic/Lymphatic: No abnormal bruising or bleeding, blood clots or swollen lymph nodes. · Allergic/Immunologic: No nasal congestion or hives. Physical Exam:   /86   Pulse 60   Ht 5' 10\" (1.778 m)   Wt 193 lb (87.5 kg)   SpO2 93%   BMI 27.69 kg/m²   Wt Readings from Last 3 Encounters:   06/14/22 193 lb (87.5 kg)   05/23/22 194 lb (88 kg)   04/25/22 192 lb (87.1 kg)     Constitutional: He is oriented to person, place, and time. He appears well-developed and well-nourished. In no acute distress. Head: Normocephalic and atraumatic. Pupils equal and round. Neck: Neck supple. No JVP or carotid bruit appreciated. No mass and no thyromegaly present. No lymphadenopathy present. Cardiovascular: Normal rate. Normal heart sounds. Exam reveals no gallop and no friction rub. No murmur heard. Pulmonary/Chest: Effort normal and breath sounds normal. No respiratory distress. He has no wheezes, rhonchi or rales. Abdominal: Soft, non-tender. Bowel sounds are normal. He exhibits no organomegaly, mass or bruit. Extremities: No edema. No cyanosis or clubbing. Pulses are 2+ radial/carotid bilaterally. Neurological: No gross cranial nerve deficit. Coordination normal.   Skin: Skin is warm and dry. There is no rash or diaphoresis. Psychiatric: He has a normal mood and affect. His speech is normal and behavior is normal.     Lab Review:   FLP:    Lab Results   Component Value Date    TRIG 335 08/27/2021    HDL 52 08/27/2021    HDL 56 06/05/2012    LDLCALC see below 08/27/2021    LDLDIRECT 109 08/27/2021    LABVLDL see below 08/27/2021     BUN/Creatinine:    Lab Results   Component Value Date    BUN 19 01/21/2022    CREATININE 1.4 01/21/2022     EKG Interpretation: 11/17/2020.  Electronic ventricular pacemaker.     Image Review:     Echo: 11/20/20  There is a pacemaker lead in the right ventricle. Overall left ventricular ejection fraction is estimated to be 50-55%. The left ventricular function is low normal.   Paradoxical septal motion secondary to paced rhythm. There is trivial mitral and aortic regurgitation.     Stress Test: 2020  The LV EF is 69%  Normal global and regional wall motion in all territories. There is a medium size, fixed defect in the apical wall consistent with soft tissue attenuation.     Cath: 2009  LEFT MAIN: Normal                 LEFT ANTERIOR DESCENDIN% mid           LEFT CIRCUMFLEX: Normal                 RIGHT CORONARY:  Normal                  Assessment/Plan:   1) Chronic diastolic heart failure s/p BiV ICD. EF 50-55% (in  from 30% in ). Patient appears euvolemic. Device interrogated today with short runs of NSVT. Continue carvedilol and lisinopril. Encouraged daily weights and low sodium diet. Instructed to call with any worsening symptoms.    2) Essential hypertension. Controlled. Goal BP <130/80. Continue medical therapy.       3) CAD. Asymptomatic. Continue with medical management and risk factor modification including aspirin, statin, and B-blocker.      4) Nonsustained ventricular tachycardia. Patient protected with ICD. Continue beta-blocker. 5) COPD. Follows with Dr. Ira Lepe.       Follow up in 6 months     Thank you very much for allowing me to participate in the care of your patient. Please do not hesitate to contact me if you have any questions. Sincerely,  Hollis Walls. Gregg Evangelista, [facility], [address] [place] [address]  Ph: [phone]  Fax: [phone]    This note was scribed in the presence of Dr. Corina Meade MD by Marisol Deleon RN  Physician Attestation: The scribes documentation has been prepared under my direction and personally reviewed by me in its entirety.    I confirm that the note above accurately reflects all work, treatment, procedures, and medical decision making performed by me. All portions of the note including but not limited to the chief complaint, history of present illness, physical exam, assessment and plan/medical decision making were personally reviewed, edited, and updated on the day of the visit.

## 2022-06-14 ENCOUNTER — OFFICE VISIT (OUTPATIENT)
Dept: CARDIOLOGY CLINIC | Age: 82
End: 2022-06-14
Payer: MEDICARE

## 2022-06-14 ENCOUNTER — NURSE ONLY (OUTPATIENT)
Dept: CARDIOLOGY CLINIC | Age: 82
End: 2022-06-14
Payer: MEDICARE

## 2022-06-14 VITALS
SYSTOLIC BLOOD PRESSURE: 130 MMHG | HEART RATE: 60 BPM | BODY MASS INDEX: 27.63 KG/M2 | DIASTOLIC BLOOD PRESSURE: 86 MMHG | OXYGEN SATURATION: 93 % | HEIGHT: 70 IN | WEIGHT: 193 LBS

## 2022-06-14 DIAGNOSIS — Z95.810 BIVENTRICULAR ICD (IMPLANTABLE CARDIOVERTER-DEFIBRILLATOR) IN PLACE: ICD-10-CM

## 2022-06-14 DIAGNOSIS — I25.10 CORONARY ARTERY DISEASE INVOLVING NATIVE CORONARY ARTERY OF NATIVE HEART WITHOUT ANGINA PECTORIS: ICD-10-CM

## 2022-06-14 DIAGNOSIS — I10 ESSENTIAL HYPERTENSION: ICD-10-CM

## 2022-06-14 DIAGNOSIS — I44.7 LBBB (LEFT BUNDLE BRANCH BLOCK): ICD-10-CM

## 2022-06-14 DIAGNOSIS — I50.32 CHRONIC DIASTOLIC CONGESTIVE HEART FAILURE (HCC): Primary | ICD-10-CM

## 2022-06-14 DIAGNOSIS — I47.29 NSVT (NONSUSTAINED VENTRICULAR TACHYCARDIA): ICD-10-CM

## 2022-06-14 DIAGNOSIS — I50.22 CHRONIC SYSTOLIC HEART FAILURE (HCC): ICD-10-CM

## 2022-06-14 PROCEDURE — 1123F ACP DISCUSS/DSCN MKR DOCD: CPT | Performed by: INTERNAL MEDICINE

## 2022-06-14 PROCEDURE — G8427 DOCREV CUR MEDS BY ELIG CLIN: HCPCS | Performed by: INTERNAL MEDICINE

## 2022-06-14 PROCEDURE — 99214 OFFICE O/P EST MOD 30 MIN: CPT | Performed by: INTERNAL MEDICINE

## 2022-06-14 PROCEDURE — 1036F TOBACCO NON-USER: CPT | Performed by: INTERNAL MEDICINE

## 2022-06-14 PROCEDURE — 93284 PRGRMG EVAL IMPLANTABLE DFB: CPT | Performed by: INTERNAL MEDICINE

## 2022-06-14 PROCEDURE — G8417 CALC BMI ABV UP PARAM F/U: HCPCS | Performed by: INTERNAL MEDICINE

## 2022-06-14 NOTE — PROGRESS NOTES
Programming device interrogation by [employer] completed for patient's CRT-D shows normal device function. Extended PVARP to prevent PMT & lowered RA outputs to 2V. EP physician will review. See interrogation under cardiology tab in the [address] [place] [address] [address] field for more details.

## 2022-06-15 ENCOUNTER — TELEPHONE (OUTPATIENT)
Dept: CARDIOLOGY CLINIC | Age: 82
End: 2022-06-15

## 2022-06-15 DIAGNOSIS — J45.909 UNCOMPLICATED ASTHMA, UNSPECIFIED ASTHMA SEVERITY, UNSPECIFIED WHETHER PERSISTENT: ICD-10-CM

## 2022-06-16 NOTE — TELEPHONE ENCOUNTER
Please call pt as a reminder (updated adapter ordered by [employer] rep) to  switch out the 3G cell adapter for the 4G adapter. Please connect once received.  If patient wants to confirm that the new adapter is working, they can push the small rectangle shaped button on the back of the monitor. Naila Clarkevishal will light the icons on either side of the heart button green.       .For additional assistance, call Kalyan Lewis ([phone]).    As you may know, 3G cell service is being shut down across the [address] [place] [address] on June 30, 2022.  This means that any device operating on a 3G cell signal will no longer work after that date.  This includes some of your patient's [employer] monitors that use the older 3G cell adapter.    In anticipation of this and to decrease clinic burden, [employer] created a work group that contacted the patients followed by your facility to inform them of this and offering to send a free 4G cell adapter for their monitors.  The patients were first sent letters (up to 2), then if no response, were called at the number [employer] had for them (from implant information).

## 2022-06-17 NOTE — TELEPHONE ENCOUNTER
Pt returned call and was informed of message below. Advised pt to give the company a call if he has any issues.

## 2022-07-21 ENCOUNTER — NURSE ONLY (OUTPATIENT)
Dept: CARDIOLOGY CLINIC | Age: 82
End: 2022-07-21
Payer: MEDICARE

## 2022-07-21 DIAGNOSIS — I44.7 LBBB (LEFT BUNDLE BRANCH BLOCK): ICD-10-CM

## 2022-07-21 DIAGNOSIS — I47.29 NSVT (NONSUSTAINED VENTRICULAR TACHYCARDIA): ICD-10-CM

## 2022-07-21 DIAGNOSIS — I50.22 CHRONIC SYSTOLIC HEART FAILURE (HCC): ICD-10-CM

## 2022-07-21 DIAGNOSIS — Z95.810 PRESENCE OF BIVENTRICULAR AICD: Primary | ICD-10-CM

## 2022-07-21 PROCEDURE — 93295 DEV INTERROG REMOTE 1/2/MLT: CPT | Performed by: INTERNAL MEDICINE

## 2022-07-21 PROCEDURE — 93296 REM INTERROG EVL PM/IDS: CPT | Performed by: INTERNAL MEDICINE

## 2022-08-02 NOTE — PROGRESS NOTES
Remote transmission received from patient's CRT-D monitor at home. Transmission shows normal sensing and pacing function. No new arrhythmias/events recorded. Ap 48%  RVp 100%  LVp 100%    End of 91-day monitoring period 7/21/22. EP physician will review. See interrogation under cardiology tab in the [address] [place] [address] [address] field for more details. Will continue to monitor remotely.

## 2022-11-03 ENCOUNTER — NURSE ONLY (OUTPATIENT)
Dept: CARDIOLOGY CLINIC | Age: 82
End: 2022-11-03
Payer: MEDICARE

## 2022-11-03 DIAGNOSIS — Z95.810 BIVENTRICULAR ICD (IMPLANTABLE CARDIOVERTER-DEFIBRILLATOR) IN PLACE: Primary | ICD-10-CM

## 2022-11-03 DIAGNOSIS — I42.9 CARDIOMYOPATHY, UNSPECIFIED TYPE (HCC): ICD-10-CM

## 2022-11-03 PROCEDURE — 93295 DEV INTERROG REMOTE 1/2/MLT: CPT | Performed by: INTERNAL MEDICINE

## 2022-11-03 PROCEDURE — 93296 REM INTERROG EVL PM/IDS: CPT | Performed by: INTERNAL MEDICINE

## 2022-11-04 NOTE — PROGRESS NOTES
Remote transmission received from patient's CRT-D monitor at home. Transmission shows normal sensing and pacing function. NSVT and AT noted (coreg). Ap 61%  RVp 99%  LVp 100%    EP physician will review. See interrogation under cardiology tab in the [address] [place] [address] [address] field for more details. Will continue to monitor remotely. (End of 91-day monitoring period 11/3/22).

## 2022-11-23 DIAGNOSIS — J45.909 UNCOMPLICATED ASTHMA, UNSPECIFIED ASTHMA SEVERITY, UNSPECIFIED WHETHER PERSISTENT: ICD-10-CM

## 2022-12-06 ENCOUNTER — OFFICE VISIT (OUTPATIENT)
Dept: CARDIOLOGY CLINIC | Age: 82
End: 2022-12-06
Payer: MEDICARE

## 2022-12-06 VITALS
SYSTOLIC BLOOD PRESSURE: 104 MMHG | HEIGHT: 70 IN | WEIGHT: 198 LBS | HEART RATE: 66 BPM | DIASTOLIC BLOOD PRESSURE: 68 MMHG | BODY MASS INDEX: 28.35 KG/M2 | OXYGEN SATURATION: 94 %

## 2022-12-06 DIAGNOSIS — I47.29 NSVT (NONSUSTAINED VENTRICULAR TACHYCARDIA): ICD-10-CM

## 2022-12-06 DIAGNOSIS — I10 ESSENTIAL HYPERTENSION: ICD-10-CM

## 2022-12-06 DIAGNOSIS — I50.32 CHRONIC DIASTOLIC CONGESTIVE HEART FAILURE (HCC): Primary | ICD-10-CM

## 2022-12-06 PROCEDURE — 99214 OFFICE O/P EST MOD 30 MIN: CPT | Performed by: INTERNAL MEDICINE

## 2022-12-06 PROCEDURE — G8417 CALC BMI ABV UP PARAM F/U: HCPCS | Performed by: INTERNAL MEDICINE

## 2022-12-06 PROCEDURE — G8427 DOCREV CUR MEDS BY ELIG CLIN: HCPCS | Performed by: INTERNAL MEDICINE

## 2022-12-06 PROCEDURE — 1123F ACP DISCUSS/DSCN MKR DOCD: CPT | Performed by: INTERNAL MEDICINE

## 2022-12-06 PROCEDURE — 1036F TOBACCO NON-USER: CPT | Performed by: INTERNAL MEDICINE

## 2022-12-06 PROCEDURE — 3078F DIAST BP <80 MM HG: CPT | Performed by: INTERNAL MEDICINE

## 2022-12-06 PROCEDURE — G8484 FLU IMMUNIZE NO ADMIN: HCPCS | Performed by: INTERNAL MEDICINE

## 2022-12-06 PROCEDURE — 3074F SYST BP LT 130 MM HG: CPT | Performed by: INTERNAL MEDICINE

## 2022-12-06 NOTE — PROGRESS NOTES
[address]      Cardiology Follow Up    James De La Cruz  [birth year]    December 6, 2022    Primary care physician: Samuel Clark MD  Reason for Referral: CHF    CC: \"I am doing well. \"     HPI:  The patient is 81 y.o. male with a past medical history significant for systolic heart failure s/p BiV ICD, hypertension, coronary artery disease, and COPD who presents today for management of heart failure. He presented to the hospital 11/17/20 with complaints of progressive shortness of breath and also having a sore throat and headaches. There was no initial obvious etiology of his shortness of breath but because his breathing appeared still labored in the emergency department he was admitted to the hospital. His troponin was normal. His proBNP was only 62. He has a history of a nonischemic cardiomyopathy s/p BiV ICD but his ejection fraction normalized. He previously followed with [facility] cardiology. Today, he states overall he is doing and feeling well. He spent time out of town with increase in walking routine. He denies any exertional cardiac symptoms except increase in bilateral ankle edema. This resolved after returning home. The patient specifically denies exertional chest pain/pressure, dyspnea at rest, worsening STANLEY, PND, orthopnea, palpitations, heart racing, lightheadedness, weight changes, and syncope. He also admits to medical therapy compliance and tolerating. His last device interrogation 11/3/22.      Past Medical History:   Diagnosis Date    AICD (automatic implantable cardioverter defibrillator) at end of life     pt instructed to bring ID card--hx of LBBB     Anxiety and depression     Arthritis     Back pain     CHF (congestive heart failure) (HCC)     GERD (gastroesophageal reflux disease)     History of blood transfusion 2005    Hyperlipidemia     Hypertension     Hypothyroidism     hypothyroidism    S/P cardiac cath 6-2009    Nl LVEF and NL cors/ chronic LBBB now paced    Sleep apnea     pt does not use Cpap machine    Wears glasses      Past Surgical History:   Procedure Laterality Date    COLONOSCOPY  2016    hua'celina--elizabeth      COLONOSCOPY  2022    Dr Fernando Alex    COLONOSCOPY N/A 2022    COLONOSCOPY performed by Fernando Alex MD at [facility] ENDOSCOPY--NO [address] Bilateral     Tennis elbow    HAMMER TOE SURGERY      x2    JOINT REPLACEMENT Left 2008    Left Knee    KNEE ARTHROSCOPY      Rt and Left(2)    KNEE CARTILAGE SURGERY  2008    Repair Left    LUMBAR LAMINECTOMY  (L2-3), (L4-5), 2015    screws    TURP      UPPER GASTROINTESTINAL ENDOSCOPY  2022    Dr Chao Alstrom dilitation to [address] [place] [address], gastric and esophagus biopsies    UPPER GASTROINTESTINAL ENDOSCOPY N/A 2022    EGD BIOPSY performed by Fernando Alex MD at [facility]  2022    EGD DILATION SAVORY performed by Fernando Alex MD at [address] History   Problem Relation Age of Onset    Breast Cancer Mother     Other Father         Peritonitis    Other Daughter         Postpartum Cardiomyopathy     Social History     Tobacco Use    Smoking status: Former     Packs/day: 0.25     Years: 10.00     Pack years: 2.50     Types: Cigarettes     Start date: 1960     Quit date: 1970     Years since quittin.9    Smokeless tobacco: Never   Vaping Use    Vaping Use: Never used   Substance Use Topics    Alcohol use:  Yes     Alcohol/week: 7.0 standard drinks     Types: 7 Cans of beer per week     Comment: one can a day    Drug use: No     Allergies   Allergen Reactions    Piroxicam Hives    Penicillins Rash and Hives     Current Outpatient Medications   Medication Sig Dispense Refill    theophylline (AUGUSTINE-24) 300 MG extended release capsule TAKE 1 CAPSULE BY MOUTH DAILY 90 capsule 3    atorvastatin (LIPITOR) 40 MG tablet TAKE 1 TABLET BY MOUTH AT BEDTIME 90 tablet 0 citalopram (CELEXA) 40 MG tablet TAKE 1 TABLET BY MOUTH EVERY NIGHT 90 tablet 2    budesonide-formoterol (SYMBICORT) 160-4.5 MCG/ACT AERO Inhale 2 puffs into the lungs 2 times daily 1 each 11    levothyroxine (SYNTHROID) 50 MCG tablet TAKE 1 TABLET BY MOUTH EVERY NIGHT 90 tablet 3    amitriptyline (ELAVIL) 25 MG tablet TAKE 1 TABLET BY MOUTH EVERY NIGHT AT BEDTIME 90 tablet 5    omeprazole (PRILOSEC) 40 MG delayed release capsule Take 1 capsule by mouth every morning (before breakfast) 90 capsule 1    carvedilol (COREG) 12.5 MG tablet TAKE 1 TABLET BY MOUTH TWICE DAILY WITH MEALS 180 tablet 3    lisinopril (PRINIVIL;ZESTRIL) 5 MG tablet TAKE 1 TABLET BY MOUTH DAILY 90 tablet 3    aspirin 81 MG chewable tablet Take 1 tablet by mouth daily 30 tablet 3    albuterol sulfate  (90 Base) MCG/ACT inhaler Inhale 2 puffs into the lungs every 6 hours as needed      butalbital-aspirin-caffeine (FIORINAL) -40 MG per capsule Take 1 capsule by mouth every 8 hours as needed for Headaches for up to 10 days. 20 capsule 0     No current facility-administered medications for this visit. Review of Systems:  Constitutional: No unanticipated weight loss. There's been no change in energy level, sleep pattern, or activity level. No fevers, chills. Eyes: No visual changes or diplopia. No scleral icterus. ENT: No Headaches, hearing loss or vertigo. No mouth sores or sore throat. Cardiovascular: as reviewed in HPI  Respiratory: No cough or wheezing, no sputum production. No hemoptysis. Gastrointestinal: No abdominal pain, appetite loss, blood in stools. No change in bowel or bladder habits. Genitourinary: No dysuria, trouble voiding, or hematuria. Musculoskeletal:  No gait disturbance, no joint complaints. Integumentary: No rash or pruritis. Neurological: No headache, diplopia, change in muscle strength, numbness or tingling. Psychiatric: No anxiety or depression. Endocrine: No temperature intolerance.  No excessive thirst, fluid intake, or urination. No tremor. Hematologic/Lymphatic: No abnormal bruising or bleeding, blood clots or swollen lymph nodes. Allergic/Immunologic: No nasal congestion or hives. Physical Exam:   /68 (Site: Right Upper Arm, Position: Sitting)   Pulse 66   Ht 5' 10\" (1.778 m)   Wt 198 lb (89.8 kg)   SpO2 94%   BMI 28.41 kg/m²   Wt Readings from Last 3 Encounters:   12/06/22 198 lb (89.8 kg)   11/01/22 196 lb (88.9 kg)   07/22/22 194 lb (88 kg)     Constitutional: He is oriented to person, place, and time. He appears well-developed and well-nourished. In no acute distress. Head: Normocephalic and atraumatic. Pupils equal and round. Neck: Neck supple. No JVP or carotid bruit appreciated. No mass and no thyromegaly present. No lymphadenopathy present. Cardiovascular: Normal rate. Normal heart sounds. Exam reveals no gallop and no friction rub. No murmur heard. Pulmonary/Chest: Effort normal and breath sounds normal. No respiratory distress. He has no wheezes, rhonchi or rales. Abdominal: Soft, non-tender. Bowel sounds are normal. He exhibits no organomegaly, mass or bruit. Extremities: No edema. No cyanosis or clubbing. Pulses are 2+ radial/carotid bilaterally. Neurological: No gross cranial nerve deficit. Coordination normal.   Skin: Skin is warm and dry. There is no rash or diaphoresis. Psychiatric: He has a normal mood and affect. His speech is normal and behavior is normal.     Lab Review:    Lab Results   Component Value Date/Time    TRIG 129 11/02/2022 07:43 AM    HDL 53 11/02/2022 07:43 AM    HDL 56 06/05/2012 10:13 PM    LDLCALC 95 11/02/2022 07:43 AM    LDLDIRECT 109 08/27/2021 02:34 PM    LABVLDL 26 11/02/2022 07:43 AM     Lab Results   Component Value Date/Time    BUN 14 11/02/2022 07:43 AM    CREATININE 1.2 11/02/2022 07:43 AM     EKG Interpretation: 11/17/2020. Electronic ventricular pacemaker.     Image Review:     Echo: 11/20/20  There is a pacemaker lead in the right ventricle. Overall left ventricular ejection fraction is estimated to be 50-55%. The left ventricular function is low normal.   Paradoxical septal motion secondary to paced rhythm. There is trivial mitral and aortic regurgitation. Stress Test: 2020  The LV EF is 69%  Normal global and regional wall motion in all territories. There is a medium size, fixed defect in the apical wall consistent with soft tissue attenuation. Cath: 2009  LEFT MAIN: Normal                 LEFT ANTERIOR DESCENDIN% mid           LEFT CIRCUMFLEX: Normal                 RIGHT CORONARY:  Normal                  Assessment/Plan:   1) Chronic diastolic heart failure s/p BiV ICD. EF 50-55% (in  from 30% in ). Patient appears euvolemic. Device interrogated today with short runs of NSVT. Continue carvedilol and lisinopril. Encouraged daily weights and low sodium diet. Instructed to call with any worsening symptoms. 2) Essential hypertension. Controlled. Goal BP <130/80. Continue medical therapy. 3) CAD. Asymptomatic. Continue with medical management and risk factor modification including aspirin, statin, and B-blocker. 4) Nonsustained ventricular tachycardia. Patient protected with ICD. Continue beta-blocker. 5) COPD. Follows with Dr. Laci Johnson. Follow up in 6 months     Thank you very much for allowing me to participate in the care of your patient. Please do not hesitate to contact me if you have any questions. Sincerely,  Candance Loose. Agnes Mitchell, [address] [place] [address], [address] [place] [address]  Ph: [phone]  Fax: [phone]    This note was scribed in the presence of Dr. Maryann Mann MD by Edie Russ RN. Physician Attestation: The scribes documentation has been prepared under my direction and personally reviewed by me in its entirety.    I confirm that the note above accurately reflects all work, treatment, procedures, and medical decision making performed by me. All portions of the note including but not limited to the chief complaint, history of present illness, physical exam, assessment and plan/medical decision making were personally reviewed, edited, and updated on the day of the visit.

## 2023-01-28 ENCOUNTER — APPOINTMENT (OUTPATIENT)
Dept: GENERAL RADIOLOGY | Age: 83
DRG: 191 | End: 2023-01-28
Payer: MEDICARE

## 2023-01-28 ENCOUNTER — HOSPITAL ENCOUNTER (INPATIENT)
Age: 83
LOS: 3 days | Discharge: HOME | DRG: 191 | End: 2023-02-01
Attending: STUDENT IN AN ORGANIZED HEALTH CARE EDUCATION/TRAINING PROGRAM | Admitting: STUDENT IN AN ORGANIZED HEALTH CARE EDUCATION/TRAINING PROGRAM
Payer: MEDICARE

## 2023-01-28 DIAGNOSIS — J44.1 COPD WITH ACUTE EXACERBATION (HCC): ICD-10-CM

## 2023-01-28 DIAGNOSIS — R07.9 CHEST PAIN WITH HIGH RISK OF ACUTE CORONARY SYNDROME: Primary | ICD-10-CM

## 2023-01-28 DIAGNOSIS — J20.8 ACUTE BRONCHITIS DUE TO COVID-19 VIRUS: ICD-10-CM

## 2023-01-28 DIAGNOSIS — U07.1 ACUTE BRONCHITIS DUE TO COVID-19 VIRUS: ICD-10-CM

## 2023-01-28 LAB
A/G RATIO: 1.4 (ref 1.1–2.2)
ALBUMIN SERPL-MCNC: 3.8 G/DL (ref 3.4–5)
ALP BLD-CCNC: 97 U/L (ref 40–129)
ALT SERPL-CCNC: 22 U/L (ref 10–40)
ANION GAP SERPL CALCULATED.3IONS-SCNC: 14 MMOL/L (ref 3–16)
AST SERPL-CCNC: 24 U/L (ref 15–37)
BASOPHILS ABSOLUTE: 0 K/UL (ref 0–0.2)
BASOPHILS RELATIVE PERCENT: 0.8 %
BILIRUB SERPL-MCNC: 0.8 MG/DL (ref 0–1)
BUN BLDV-MCNC: 18 MG/DL (ref 7–20)
CALCIUM SERPL-MCNC: 9 MG/DL (ref 8.3–10.6)
CHLORIDE BLD-SCNC: 102 MMOL/L (ref 99–110)
CO2: 23 MMOL/L (ref 21–32)
CREAT SERPL-MCNC: 1.2 MG/DL (ref 0.8–1.3)
EOSINOPHILS ABSOLUTE: 0.3 K/UL (ref 0–0.6)
EOSINOPHILS RELATIVE PERCENT: 5.9 %
GFR SERPL CREATININE-BSD FRML MDRD: >60 ML/MIN/{1.73_M2}
GLUCOSE BLD-MCNC: 110 MG/DL (ref 70–99)
HCT VFR BLD CALC: 41.6 % (ref 40.5–52.5)
HEMOGLOBIN: 14 G/DL (ref 13.5–17.5)
LYMPHOCYTES ABSOLUTE: 1.3 K/UL (ref 1–5.1)
LYMPHOCYTES RELATIVE PERCENT: 24.3 %
MCH RBC QN AUTO: 30.8 PG (ref 26–34)
MCHC RBC AUTO-ENTMCNC: 33.7 G/DL (ref 31–36)
MCV RBC AUTO: 91.5 FL (ref 80–100)
MONOCYTES ABSOLUTE: 0.7 K/UL (ref 0–1.3)
MONOCYTES RELATIVE PERCENT: 12.8 %
NEUTROPHILS ABSOLUTE: 2.9 K/UL (ref 1.7–7.7)
NEUTROPHILS RELATIVE PERCENT: 56.2 %
PDW BLD-RTO: 13.5 % (ref 12.4–15.4)
PLATELET # BLD: 174 K/UL (ref 135–450)
PMV BLD AUTO: 7.5 FL (ref 5–10.5)
POTASSIUM REFLEX MAGNESIUM: 3.6 MMOL/L (ref 3.5–5.1)
PRO-BNP: 107 PG/ML (ref 0–449)
RAPID INFLUENZA  B AGN: NEGATIVE
RAPID INFLUENZA A AGN: NEGATIVE
RBC # BLD: 4.55 M/UL (ref 4.2–5.9)
SARS-COV-2, NAAT: DETECTED
SODIUM BLD-SCNC: 139 MMOL/L (ref 136–145)
TOTAL PROTEIN: 6.5 G/DL (ref 6.4–8.2)
TROPONIN: <0.01 NG/ML
WBC # BLD: 5.2 K/UL (ref 4–11)

## 2023-01-28 PROCEDURE — 80053 COMPREHEN METABOLIC PANEL: CPT

## 2023-01-28 PROCEDURE — 6360000002 HC RX W HCPCS: Performed by: STUDENT IN AN ORGANIZED HEALTH CARE EDUCATION/TRAINING PROGRAM

## 2023-01-28 PROCEDURE — 85025 COMPLETE CBC W/AUTO DIFF WBC: CPT

## 2023-01-28 PROCEDURE — 96374 THER/PROPH/DIAG INJ IV PUSH: CPT

## 2023-01-28 PROCEDURE — 84484 ASSAY OF TROPONIN QUANT: CPT

## 2023-01-28 PROCEDURE — 83880 ASSAY OF NATRIURETIC PEPTIDE: CPT

## 2023-01-28 PROCEDURE — 99285 EMERGENCY DEPT VISIT HI MDM: CPT

## 2023-01-28 PROCEDURE — 71045 X-RAY EXAM CHEST 1 VIEW: CPT

## 2023-01-28 PROCEDURE — 36415 COLL VENOUS BLD VENIPUNCTURE: CPT

## 2023-01-28 PROCEDURE — 93005 ELECTROCARDIOGRAM TRACING: CPT | Performed by: STUDENT IN AN ORGANIZED HEALTH CARE EDUCATION/TRAINING PROGRAM

## 2023-01-28 PROCEDURE — 87635 SARS-COV-2 COVID-19 AMP PRB: CPT

## 2023-01-28 PROCEDURE — 87804 INFLUENZA ASSAY W/OPTIC: CPT

## 2023-01-28 RX ORDER — METHYLPREDNISOLONE SODIUM SUCCINATE 125 MG/2ML
125 INJECTION, POWDER, LYOPHILIZED, FOR SOLUTION INTRAMUSCULAR; INTRAVENOUS ONCE
Status: COMPLETED | OUTPATIENT
Start: 2023-01-29 | End: 2023-01-28

## 2023-01-28 RX ORDER — IPRATROPIUM BROMIDE AND ALBUTEROL SULFATE 2.5; .5 MG/3ML; MG/3ML
1 SOLUTION RESPIRATORY (INHALATION) ONCE
Status: COMPLETED | OUTPATIENT
Start: 2023-01-29 | End: 2023-01-29

## 2023-01-28 RX ADMIN — METHYLPREDNISOLONE SODIUM SUCCINATE 125 MG: 125 INJECTION, POWDER, FOR SOLUTION INTRAMUSCULAR; INTRAVENOUS at 23:52

## 2023-01-28 ASSESSMENT — LIFESTYLE VARIABLES
HOW OFTEN DO YOU HAVE A DRINK CONTAINING ALCOHOL: NEVER
HOW MANY STANDARD DRINKS CONTAINING ALCOHOL DO YOU HAVE ON A TYPICAL DAY: PATIENT DOES NOT DRINK

## 2023-01-28 ASSESSMENT — PAIN - FUNCTIONAL ASSESSMENT: PAIN_FUNCTIONAL_ASSESSMENT: 0-10

## 2023-01-28 ASSESSMENT — PAIN SCALES - GENERAL: PAINLEVEL_OUTOF10: 0

## 2023-01-29 PROBLEM — U07.1 COVID-19: Status: ACTIVE | Noted: 2023-01-29

## 2023-01-29 PROBLEM — R07.9 CHEST PAIN: Status: ACTIVE | Noted: 2023-01-29

## 2023-01-29 LAB
ALBUMIN SERPL-MCNC: 3.5 G/DL (ref 3.4–5)
ANION GAP SERPL CALCULATED.3IONS-SCNC: 12 MMOL/L (ref 3–16)
BASOPHILS ABSOLUTE: 0 K/UL (ref 0–0.2)
BASOPHILS RELATIVE PERCENT: 0.2 %
BUN BLDV-MCNC: 17 MG/DL (ref 7–20)
CALCIUM SERPL-MCNC: 8.6 MG/DL (ref 8.3–10.6)
CHLORIDE BLD-SCNC: 102 MMOL/L (ref 99–110)
CO2: 23 MMOL/L (ref 21–32)
CREAT SERPL-MCNC: 1 MG/DL (ref 0.8–1.3)
EKG ATRIAL RATE: 66 BPM
EKG DIAGNOSIS: NORMAL
EKG P AXIS: 80 DEGREES
EKG P-R INTERVAL: 116 MS
EKG Q-T INTERVAL: 454 MS
EKG QRS DURATION: 138 MS
EKG QTC CALCULATION (BAZETT): 475 MS
EKG R AXIS: -70 DEGREES
EKG T AXIS: 92 DEGREES
EKG VENTRICULAR RATE: 66 BPM
EOSINOPHILS ABSOLUTE: 0 K/UL (ref 0–0.6)
EOSINOPHILS RELATIVE PERCENT: 0.2 %
GFR SERPL CREATININE-BSD FRML MDRD: >60 ML/MIN/{1.73_M2}
GLUCOSE BLD-MCNC: 115 MG/DL (ref 70–99)
GLUCOSE BLD-MCNC: 157 MG/DL (ref 70–99)
HCT VFR BLD CALC: 41 % (ref 40.5–52.5)
HEMOGLOBIN: 13.7 G/DL (ref 13.5–17.5)
LYMPHOCYTES ABSOLUTE: 0.6 K/UL (ref 1–5.1)
LYMPHOCYTES RELATIVE PERCENT: 15.9 %
MAGNESIUM: 1.8 MG/DL (ref 1.8–2.4)
MCH RBC QN AUTO: 30.6 PG (ref 26–34)
MCHC RBC AUTO-ENTMCNC: 33.4 G/DL (ref 31–36)
MCV RBC AUTO: 91.8 FL (ref 80–100)
MONOCYTES ABSOLUTE: 0.1 K/UL (ref 0–1.3)
MONOCYTES RELATIVE PERCENT: 1.9 %
NEUTROPHILS ABSOLUTE: 2.8 K/UL (ref 1.7–7.7)
NEUTROPHILS RELATIVE PERCENT: 81.8 %
PDW BLD-RTO: 13.4 % (ref 12.4–15.4)
PERFORMED ON: ABNORMAL
PHOSPHORUS: 3.5 MG/DL (ref 2.5–4.9)
PLATELET # BLD: 171 K/UL (ref 135–450)
PMV BLD AUTO: 7.7 FL (ref 5–10.5)
POTASSIUM SERPL-SCNC: 3.7 MMOL/L (ref 3.5–5.1)
RBC # BLD: 4.47 M/UL (ref 4.2–5.9)
SODIUM BLD-SCNC: 137 MMOL/L (ref 136–145)
TROPONIN: <0.01 NG/ML
WBC # BLD: 3.5 K/UL (ref 4–11)

## 2023-01-29 PROCEDURE — 94640 AIRWAY INHALATION TREATMENT: CPT

## 2023-01-29 PROCEDURE — 36415 COLL VENOUS BLD VENIPUNCTURE: CPT

## 2023-01-29 PROCEDURE — 2580000003 HC RX 258: Performed by: STUDENT IN AN ORGANIZED HEALTH CARE EDUCATION/TRAINING PROGRAM

## 2023-01-29 PROCEDURE — 6370000000 HC RX 637 (ALT 250 FOR IP): Performed by: STUDENT IN AN ORGANIZED HEALTH CARE EDUCATION/TRAINING PROGRAM

## 2023-01-29 PROCEDURE — 6360000002 HC RX W HCPCS: Performed by: STUDENT IN AN ORGANIZED HEALTH CARE EDUCATION/TRAINING PROGRAM

## 2023-01-29 PROCEDURE — 83735 ASSAY OF MAGNESIUM: CPT

## 2023-01-29 PROCEDURE — 94761 N-INVAS EAR/PLS OXIMETRY MLT: CPT

## 2023-01-29 PROCEDURE — 80069 RENAL FUNCTION PANEL: CPT

## 2023-01-29 PROCEDURE — 1200000000 HC SEMI PRIVATE

## 2023-01-29 PROCEDURE — 94760 N-INVAS EAR/PLS OXIMETRY 1: CPT

## 2023-01-29 PROCEDURE — 99223 1ST HOSP IP/OBS HIGH 75: CPT | Performed by: INTERNAL MEDICINE

## 2023-01-29 PROCEDURE — 93010 ELECTROCARDIOGRAM REPORT: CPT | Performed by: INTERNAL MEDICINE

## 2023-01-29 PROCEDURE — 84484 ASSAY OF TROPONIN QUANT: CPT

## 2023-01-29 PROCEDURE — 85025 COMPLETE CBC W/AUTO DIFF WBC: CPT

## 2023-01-29 RX ORDER — LEVOTHYROXINE SODIUM 0.05 MG/1
50 TABLET ORAL DAILY
Status: DISCONTINUED | OUTPATIENT
Start: 2023-01-29 | End: 2023-02-01 | Stop reason: HOSPADM

## 2023-01-29 RX ORDER — SODIUM CHLORIDE 9 MG/ML
INJECTION, SOLUTION INTRAVENOUS PRN
Status: DISCONTINUED | OUTPATIENT
Start: 2023-01-29 | End: 2023-02-01 | Stop reason: HOSPADM

## 2023-01-29 RX ORDER — METHYLPREDNISOLONE SODIUM SUCCINATE 40 MG/ML
40 INJECTION, POWDER, LYOPHILIZED, FOR SOLUTION INTRAMUSCULAR; INTRAVENOUS EVERY 6 HOURS
Status: DISCONTINUED | OUTPATIENT
Start: 2023-01-29 | End: 2023-01-29

## 2023-01-29 RX ORDER — LISINOPRIL 5 MG/1
5 TABLET ORAL DAILY
Status: DISCONTINUED | OUTPATIENT
Start: 2023-01-29 | End: 2023-02-01 | Stop reason: HOSPADM

## 2023-01-29 RX ORDER — METHYLPREDNISOLONE SODIUM SUCCINATE 40 MG/ML
40 INJECTION, POWDER, LYOPHILIZED, FOR SOLUTION INTRAMUSCULAR; INTRAVENOUS EVERY 8 HOURS
Status: DISCONTINUED | OUTPATIENT
Start: 2023-01-29 | End: 2023-02-01 | Stop reason: HOSPADM

## 2023-01-29 RX ORDER — GUAIFENESIN/DEXTROMETHORPHAN 100-10MG/5
5 SYRUP ORAL EVERY 4 HOURS PRN
Status: DISCONTINUED | OUTPATIENT
Start: 2023-01-29 | End: 2023-02-01 | Stop reason: HOSPADM

## 2023-01-29 RX ORDER — ACETAMINOPHEN 650 MG/1
650 SUPPOSITORY RECTAL EVERY 6 HOURS PRN
Status: DISCONTINUED | OUTPATIENT
Start: 2023-01-29 | End: 2023-02-01 | Stop reason: HOSPADM

## 2023-01-29 RX ORDER — SODIUM CHLORIDE 0.9 % (FLUSH) 0.9 %
5-40 SYRINGE (ML) INJECTION EVERY 12 HOURS SCHEDULED
Status: DISCONTINUED | OUTPATIENT
Start: 2023-01-29 | End: 2023-02-01 | Stop reason: HOSPADM

## 2023-01-29 RX ORDER — ATORVASTATIN CALCIUM 80 MG/1
80 TABLET, FILM COATED ORAL NIGHTLY
Status: DISCONTINUED | OUTPATIENT
Start: 2023-01-29 | End: 2023-02-01 | Stop reason: HOSPADM

## 2023-01-29 RX ORDER — PREDNISONE 20 MG/1
40 TABLET ORAL DAILY
Status: DISCONTINUED | OUTPATIENT
Start: 2023-01-29 | End: 2023-01-29

## 2023-01-29 RX ORDER — PANTOPRAZOLE SODIUM 40 MG/1
40 TABLET, DELAYED RELEASE ORAL
Status: DISCONTINUED | OUTPATIENT
Start: 2023-01-29 | End: 2023-02-01 | Stop reason: HOSPADM

## 2023-01-29 RX ORDER — ASPIRIN 81 MG/1
81 TABLET, CHEWABLE ORAL DAILY
Status: DISCONTINUED | OUTPATIENT
Start: 2023-01-30 | End: 2023-02-01 | Stop reason: HOSPADM

## 2023-01-29 RX ORDER — CITALOPRAM 20 MG/1
40 TABLET ORAL DAILY
Status: DISCONTINUED | OUTPATIENT
Start: 2023-01-29 | End: 2023-02-01 | Stop reason: HOSPADM

## 2023-01-29 RX ORDER — ACETAMINOPHEN 325 MG/1
650 TABLET ORAL EVERY 6 HOURS PRN
Status: DISCONTINUED | OUTPATIENT
Start: 2023-01-29 | End: 2023-02-01 | Stop reason: HOSPADM

## 2023-01-29 RX ORDER — ENOXAPARIN SODIUM 100 MG/ML
40 INJECTION SUBCUTANEOUS DAILY
Status: DISCONTINUED | OUTPATIENT
Start: 2023-01-29 | End: 2023-02-01 | Stop reason: HOSPADM

## 2023-01-29 RX ORDER — SODIUM CHLORIDE 0.9 % (FLUSH) 0.9 %
5-40 SYRINGE (ML) INJECTION PRN
Status: DISCONTINUED | OUTPATIENT
Start: 2023-01-29 | End: 2023-02-01 | Stop reason: HOSPADM

## 2023-01-29 RX ORDER — CARVEDILOL 6.25 MG/1
6.25 TABLET ORAL 2 TIMES DAILY WITH MEALS
Status: DISCONTINUED | OUTPATIENT
Start: 2023-01-30 | End: 2023-02-01 | Stop reason: HOSPADM

## 2023-01-29 RX ORDER — NITROGLYCERIN 0.4 MG/1
0.4 TABLET SUBLINGUAL EVERY 5 MIN PRN
Status: DISCONTINUED | OUTPATIENT
Start: 2023-01-29 | End: 2023-02-01 | Stop reason: HOSPADM

## 2023-01-29 RX ORDER — ONDANSETRON 2 MG/ML
4 INJECTION INTRAMUSCULAR; INTRAVENOUS EVERY 6 HOURS PRN
Status: DISCONTINUED | OUTPATIENT
Start: 2023-01-29 | End: 2023-02-01 | Stop reason: HOSPADM

## 2023-01-29 RX ORDER — AMITRIPTYLINE HYDROCHLORIDE 25 MG/1
25 TABLET, FILM COATED ORAL NIGHTLY
Status: DISCONTINUED | OUTPATIENT
Start: 2023-01-29 | End: 2023-02-01 | Stop reason: HOSPADM

## 2023-01-29 RX ORDER — ALBUTEROL SULFATE 90 UG/1
2 AEROSOL, METERED RESPIRATORY (INHALATION) EVERY 4 HOURS PRN
Status: DISCONTINUED | OUTPATIENT
Start: 2023-01-29 | End: 2023-02-01 | Stop reason: HOSPADM

## 2023-01-29 RX ADMIN — ATORVASTATIN CALCIUM 80 MG: 80 TABLET, FILM COATED ORAL at 02:49

## 2023-01-29 RX ADMIN — METHYLPREDNISOLONE SODIUM SUCCINATE 40 MG: 40 INJECTION, POWDER, FOR SOLUTION INTRAMUSCULAR; INTRAVENOUS at 12:01

## 2023-01-29 RX ADMIN — CITALOPRAM HYDROBROMIDE 40 MG: 20 TABLET ORAL at 09:29

## 2023-01-29 RX ADMIN — AZITHROMYCIN DIHYDRATE 500 MG: 500 INJECTION, POWDER, LYOPHILIZED, FOR SOLUTION INTRAVENOUS at 02:46

## 2023-01-29 RX ADMIN — METHYLPREDNISOLONE SODIUM SUCCINATE 40 MG: 40 INJECTION, POWDER, FOR SOLUTION INTRAMUSCULAR; INTRAVENOUS at 03:35

## 2023-01-29 RX ADMIN — MOMETASONE FUROATE AND FORMOTEROL FUMARATE DIHYDRATE 2 PUFF: 200; 5 AEROSOL RESPIRATORY (INHALATION) at 08:31

## 2023-01-29 RX ADMIN — AMITRIPTYLINE HYDROCHLORIDE 25 MG: 25 TABLET, FILM COATED ORAL at 22:36

## 2023-01-29 RX ADMIN — ATORVASTATIN CALCIUM 80 MG: 80 TABLET, FILM COATED ORAL at 22:36

## 2023-01-29 RX ADMIN — MOMETASONE FUROATE AND FORMOTEROL FUMARATE DIHYDRATE 2 PUFF: 200; 5 AEROSOL RESPIRATORY (INHALATION) at 19:47

## 2023-01-29 RX ADMIN — PANTOPRAZOLE SODIUM 40 MG: 40 TABLET, DELAYED RELEASE ORAL at 07:01

## 2023-01-29 RX ADMIN — LEVOTHYROXINE SODIUM 50 MCG: 0.05 TABLET ORAL at 07:01

## 2023-01-29 RX ADMIN — ALBUTEROL SULFATE 2 PUFF: 90 AEROSOL, METERED RESPIRATORY (INHALATION) at 19:48

## 2023-01-29 RX ADMIN — SODIUM CHLORIDE, PRESERVATIVE FREE 10 ML: 5 INJECTION INTRAVENOUS at 22:38

## 2023-01-29 RX ADMIN — LISINOPRIL 5 MG: 5 TABLET ORAL at 09:29

## 2023-01-29 RX ADMIN — IPRATROPIUM BROMIDE AND ALBUTEROL SULFATE 1 AMPULE: .5; 3 SOLUTION RESPIRATORY (INHALATION) at 00:10

## 2023-01-29 RX ADMIN — METHYLPREDNISOLONE SODIUM SUCCINATE 40 MG: 40 INJECTION, POWDER, FOR SOLUTION INTRAMUSCULAR; INTRAVENOUS at 18:27

## 2023-01-29 RX ADMIN — ENOXAPARIN SODIUM 40 MG: 100 INJECTION SUBCUTANEOUS at 09:29

## 2023-01-29 RX ADMIN — AMITRIPTYLINE HYDROCHLORIDE 25 MG: 25 TABLET, FILM COATED ORAL at 02:49

## 2023-01-29 ASSESSMENT — PAIN SCALES - GENERAL
PAINLEVEL_OUTOF10: 3
PAINLEVEL_OUTOF10: 5
PAINLEVEL_OUTOF10: 2
PAINLEVEL_OUTOF10: 1

## 2023-01-29 ASSESSMENT — PAIN DESCRIPTION - PAIN TYPE: TYPE: ACUTE PAIN

## 2023-01-29 ASSESSMENT — PAIN DESCRIPTION - LOCATION
LOCATION: CHEST
LOCATION: CHEST

## 2023-01-29 ASSESSMENT — PAIN DESCRIPTION - DESCRIPTORS
DESCRIPTORS: SHARP
DESCRIPTORS: DULL

## 2023-01-29 ASSESSMENT — PAIN DESCRIPTION - ORIENTATION: ORIENTATION: MID

## 2023-01-29 NOTE — ED PROVIDER NOTES
[facility]        Pt Name: Marisol Juan  MRN: [id number]  [place] 1940  Date of evaluation: 1/28/2023  Provider: Hyacinth Potter MD  PCP: Philly Forte MD  Note Started: 8:44 AM EST 1/29/23    CHIEF COMPLAINT       Chief Complaint   Patient presents with    Chest Pain     CP that feels like tightness and heaviness; in middle of chest; does not radiate anywhere; started about 9PM tonight; pt does have a pacemaker; 243 ASA given per ems; EMS put pt on 4L O2 as pt stated that he was also short of breath; pt does not normally wear O2; pt has hx of COPD; sating at 94% on RA currently        HISTORY OF PRESENT ILLNESS: 1 or more Elements     History from : Patient and EMS    Limitations to history : None    Marisol Juan is a 81 y.o. male who presents c/o chest pain that occurred approximately 2 hours prior to arrival.  Described as pressure and tightness, ASA given by EMS. Associated with SOB. Preceded by a cough x 2-3 days. Mildly hypoxic for EMS, improved with NC en route. Denies fevers. Symptoms not otherwise alleviated or exacerbated by other factors. Nursing Notes were all reviewed and agreed with or any disagreements were addressed in the HPI. REVIEW OF SYSTEMS :      Positives and Pertinent negatives as per HPI. ROS otherwise unremarkable.     SURGICAL HISTORY     Past Surgical History:   Procedure Laterality Date    COLONOSCOPY  05/11/2016    o'celina--elizabeth 2021     COLONOSCOPY  03/17/2022    Dr Tomas Drummond    COLONOSCOPY N/A 03/17/2022    COLONOSCOPY performed by Tomas Drummond MD at [facility] ENDOSCOPY--NO [address] Bilateral     Tennis elbow    HAMMER TOE SURGERY      x2    JOINT REPLACEMENT Left 12/2008    Left Knee    KNEE ARTHROSCOPY      Rt and Left(2)    KNEE CARTILAGE SURGERY  08/2008    Repair Left    LUMBAR LAMINECTOMY  9-2007(L2-3), (L4-5), 2015    screws    TURP      UPPER GASTROINTESTINAL ENDOSCOPY  03/17/2022    Dr Rodriguez Owusu dilitation to [address] [place] [address] savary, gastric and esophagus biopsies    UPPER GASTROINTESTINAL ENDOSCOPY N/A 03/17/2022    EGD BIOPSY performed by Chaim Prakash MD at [facility]  03/17/2022    EGD DILATION SAVORY performed by Chaim Prakash MD at [address] [place] [address]       Current Discharge Medication List        CONTINUE these medications which have NOT CHANGED    Details   atorvastatin (LIPITOR) 40 MG tablet TAKE 1 TABLET BY MOUTH AT BEDTIME  Qty: 90 tablet, Refills: 0      lisinopril (PRINIVIL;ZESTRIL) 5 MG tablet TAKE 1 TABLET BY MOUTH DAILY  Qty: 90 tablet, Refills: 3      theophylline (AUGUSTINE-24) 300 MG extended release capsule TAKE 1 CAPSULE BY MOUTH DAILY  Qty: 90 capsule, Refills: 3    Comments: **Patient requests 90 days supply**  Associated Diagnoses: Uncomplicated asthma, unspecified asthma severity, unspecified whether persistent      citalopram (CELEXA) 40 MG tablet TAKE 1 TABLET BY MOUTH EVERY NIGHT  Qty: 90 tablet, Refills: 2      budesonide-formoterol (SYMBICORT) 160-4.5 MCG/ACT AERO Inhale 2 puffs into the lungs 2 times daily  Qty: 1 each, Refills: 11    Associated Diagnoses: Severe asthma without complication, unspecified whether persistent      levothyroxine (SYNTHROID) 50 MCG tablet TAKE 1 TABLET BY MOUTH EVERY NIGHT  Qty: 90 tablet, Refills: 3      amitriptyline (ELAVIL) 25 MG tablet TAKE 1 TABLET BY MOUTH EVERY NIGHT AT BEDTIME  Qty: 90 tablet, Refills: 5      omeprazole (PRILOSEC) 40 MG delayed release capsule Take 1 capsule by mouth every morning (before breakfast)  Qty: 90 capsule, Refills: 1      carvedilol (COREG) 12.5 MG tablet TAKE 1 TABLET BY MOUTH TWICE DAILY WITH MEALS  Qty: 180 tablet, Refills: 3      aspirin 81 MG chewable tablet Take 1 tablet by mouth daily  Qty: 30 tablet, Refills: 3      albuterol sulfate  (90 Base) MCG/ACT inhaler Inhale 2 puffs into the lungs every 6 hours as needed             ALLERGIES     Piroxicam and Penicillins    FAMILYHISTORY       Family History   Problem Relation Age of Onset    Breast Cancer Mother     Other Father         Peritonitis    Other Daughter         Postpartum Cardiomyopathy        SOCIAL HISTORY       Social History     Tobacco Use    Smoking status: Former     Packs/day: 0.25     Years: 10.00     Pack years: 2.50     Types: Cigarettes     Start date: 1960     Quit date: 1970     Years since quittin.1    Smokeless tobacco: Never   Vaping Use    Vaping Use: Never used   Substance Use Topics    Alcohol use: Yes     Alcohol/week: 7.0 standard drinks     Types: 7 Cans of beer per week     Comment: one can a day    Drug use: No       SCREENINGS        Saline Coma Scale  Eye Opening: Spontaneous  Best Verbal Response: Oriented  Best Motor Response: Obeys commands  Thad Coma Scale Score: 15                CIWA Assessment  BP: (!) 147/70  Heart Rate: 77           PHYSICAL EXAM  1 or more Elements     ED Triage Vitals [23 2230]   BP Temp Temp Source Heart Rate Resp SpO2 Height Weight   (!) 147/73 98.2 °F (36.8 °C) Oral 69 12 94 % 5' 10\" (1.778 m) 208 lb 1.8 oz (94.4 kg)       Physical Exam    General: Alert, No acute distress. Eye: Normal conjunctiva. Sclera anicteric. PERRL  HENT: Oral mucosa is moist.  Respiratory: Respirations even and non-labored. Mild expiratory wheezing. Cardiovascular: Normal rate, Regular rhythm. Intact peripheral pulses. No edema, no JVD. Gastrointestinal: Non-distended. Musculoskeletal: No deformities  Integumentary: Warm, Dry. Neurologic:  No focal deficits.       DIAGNOSTIC RESULTS   LABS:    Labs Reviewed   COVID-19, RAPID - Abnormal; Notable for the following components:       Result Value    SARS-CoV-2, NAAT DETECTED (*)     All other components within normal limits   COMPREHENSIVE METABOLIC PANEL W/ REFLEX TO MG FOR LOW K - Abnormal; Notable for the following components:    Glucose 110 (*)     All other components within normal limits   POCT GLUCOSE - Abnormal; Notable for the following components:    POC Glucose 115 (*)     All other components within normal limits   RAPID INFLUENZA A/B ANTIGENS   CBC WITH AUTO DIFFERENTIAL   TROPONIN   BRAIN NATRIURETIC PEPTIDE   TROPONIN   CBC WITH AUTO DIFFERENTIAL   RENAL FUNCTION PANEL   MAGNESIUM       When ordered only abnormal lab results are displayed. All other labs were within normal range or not returned as of this dictation. EKG: The Ekg interpreted by me shows  Rhythm paced rhythm  Rate of 66 bpm  Axis is  left axis deviation  Intervals and durations remarkable for paced rhythm  ST Segments: nonspecific ST abnls  Compared to prior EKG dated 6/14/22, no significant change. RADIOLOGY:   Non-plain film images such as CT, Ultrasound and MRI are read by the radiologist. Plain radiographic images are visualized and preliminarily interpreted by the ED Provider with the below findings:      Interpretation per the Radiologist below, if available at the time of this note:    XR CHEST PORTABLE   Final Result   Stable chronic changes with no acute abnormality seen. XR CHEST PORTABLE    Result Date: 1/28/2023  EXAMINATION: ONE XRAY VIEW OF THE CHEST 1/28/2023 10:58 pm COMPARISON: 11/20/2020 HISTORY: ORDERING SYSTEM PROVIDED HISTORY: chest pain, SOB TECHNOLOGIST PROVIDED HISTORY: Reason for exam:->chest pain, SOB Reason for Exam: chest pain, SOB FINDINGS: The heart is normal.  The pulmonary vessels are normal.  The lungs are mildly hyperinflated. No consolidation or effusion is seen. There is a left pacemaker in place which is unchanged. The bones are intact. Stable chronic changes with no acute abnormality seen.        PAST MEDICAL HISTORY      has a past medical history of AICD (automatic implantable cardioverter defibrillator) at end of life, Anxiety and depression, Arthritis, Back pain, CHF (congestive heart failure) ([facility] [address] GERD (gastroesophageal reflux disease), History of blood transfusion (2005), Hyperlipidemia, Hypertension, Hypothyroidism, S/P cardiac cath (6-2009), Sleep apnea, and Wears glasses. EMERGENCY DEPARTMENT COURSE and DIFFERENTIAL DIAGNOSIS/MDM:   Vitals:    Vitals:    01/29/23 0115 01/29/23 0200 01/29/23 0557 01/29/23 0831   BP: (!) 145/74  (!) 147/70    Pulse: 60 69 62 77   Resp: 16  16 18   Temp: 98 °F (36.7 °C)  97.9 °F (36.6 °C)    TempSrc: Oral  Oral    SpO2: 94%  92% 93%   Weight: 198 lb 7 oz (90 kg)  198 lb 6.6 oz (90 kg)    Height: 5' 9\" (1.753 m)  5' 9\" (1.753 m)        Patient was given the following medications:  Medications   ipratropium-albuterol (DUONEB) nebulizer solution 1 ampule (1 ampule Inhalation Given 1/29/23 0010)   methylPREDNISolone sodium (SOLU-MEDROL) injection 125 mg (125 mg IntraVENous Given 1/28/23 3977)             Is this patient to be included in the SEP-1 Core Measure due to severe sepsis or septic shock? No   Exclusion criteria - the patient is NOT to be included for SEP-1 Core Measure due to:  Viral etiology found or highly suspected (including COVID-19) without concomitant bacterial infection    Chronic Conditions: HLD, CHF, COPD, HTN    CONSULTS: (Who and What was discussed)  IP CONSULT TO HOSPITALIST  IP CONSULT TO CARDIOLOGY    Discussion with Other Profesionals : hospitalist for admission, Dr. Giovanna Alcocer    Social Determinants : None    Records Reviewed : None    CC/HPI Summary, DDx, ED Course, and Reassessment: 61-year-old man with significant cardiac history, CHF with AICD placement, COPD who presents with shortness of breath, chest pain started 1 to 2 hours prior to arrival.  Onset gradual, progressively worse, dyspneic on presentation with expiratory wheezing, was loaded with aspirin by EMS. EKG with nonspecific ST abnormalities, stable from prior. Given the description of the pain, patient's risk factors, will need admission for ACS rule out.   Patient is found to be COVID-positive, this was considered given the antecedent upper respiratory symptoms. The chest pain however is described as anginal, new. Had wheezing as well, given the history of COPD, and new oxygen requirement, given nebs, methylprednisone to improvement. Patient without any chest pain currently in the emergency department. Initial troponin negative. Admitted to medicine, went up in stable condition. Disposition Considerations (tests considered but not done, Shared Decision Making, Pt Expectation of Test or Tx.): admitted. I am the Primary Clinician of Record. FINAL IMPRESSION      1. Chest pain with high risk of acute coronary syndrome    2. Acute bronchitis due to COVID-19 virus    3.  COPD with acute exacerbation [facility])          DISPOSITION/PLAN     DISPOSITION Admitted 01/29/2023 12:31:15 AM           (Please note that portions of this note were completed with a voice recognition program.  Efforts were made to edit the dictations but occasionally words are mis-transcribed.)    Torin Luis MD (electronically signed)            Torin Luis MD  01/30/23 0800

## 2023-01-29 NOTE — ED NOTES
Report called to Gloria Douglass RN. SBAR discussed. All questions answered. RN verbalized understanding.      Carlos Blackmon RN  01/29/23 0102

## 2023-01-29 NOTE — PROGRESS NOTES
4 Eyes Skin Assessment     NAME:  Shirley Garza  YOB: 1940  MEDICAL RECORD NUMBER:  [id number]    The patient is being assessed for  Admission    I agree that One RN have performed a thorough Head to Toe Skin Assessment on the patient. ALL assessment sites listed below have been assessed. Areas assessed by both nurses:    Head, Face, Ears, Shoulders, Back, Chest, Arms, Elbows, Hands, Sacrum. Buttock, Coccyx, Ischium, and Legs. Feet and Heels        Does the Patient have a Wound?  No noted wound(s)       Earl Prevention initiated by RN: No   Wound Care Orders initiated by RN: NA    Pressure Injury (Stage 3,4, Unstageable, DTI, NWPT, and Complex wounds) if present place referral order by RN under : NA    New and Established Ostomies, if present place, referral order under : NA      Nurse 1 eSignature: Electronically signed by Courtney Moctezuma RN on 1/29/23 at 1:58 AM EST    **SHARE this note so that the co-signing nurse is able to place an eSignature**    Nurse 2 eSignature: Electronically signed by Maliha Mooney RN on 1/29/23 at 2:19 AM EST

## 2023-01-29 NOTE — PROGRESS NOTES
Patient arrived the unit via W/C able to walk into the room gait sloe and steady. States his chest is slowly starting to feel better pain level lower than when he first arrived. Patient oriented to his room, call light, educated on Covid and other Dx. Bed in lowest position, call light in reach patient assisted into gown with non skid socks on.

## 2023-01-29 NOTE — H&P
Hospital Medicine History & Physical      PCP: Reji Ribeiro MD    Date of Admission: 1/28/2023    Date of Service: Pt seen/examined on 1/29/2023 and admitted to inpatient    Chief Complaint: Cough, sputum production, increased shortness of breath both on exertion and more recently at rest, chills      History Of Present Illness: The patient is a 81 y.o. male with past medical history as below who presents to [facility] with concern that over the last 3 days or so since he was getting off of his most recent cruise and coming back home he apparently started having increasing levels of shortness of breath mainly on exertion initially a few days before coming back from his cruise but it was not excessive until these last 3 days. He did note some increased level of cough and sputum production in addition to the worsening shortness of breath on exertion. He felt as though since the symptoms were getting worse over the last 3 days without provocation and even while he was resting he started feeling increasing levels of these symptoms he was going to come to the ED. In the last day or so however he is also noted some increasing levels of chest pain on exertion which she did not feel was entirely related to his breathing issue. He was concern for this new development of chest pain since he does have a history of CHF as well as has a pacemaker and ICD. Came to the ED for further evaluation for the symptoms. He has had some chills at home but denies any other recent symptoms of fevers, dizziness, syncope, leg swelling more than what is noted, dysuria, blood in urine/stool/sputum, nausea/vomiting/diarrhea/abdominal pain, rashes, loss of bowel or bladder continence, vision change or focal weakness.     Past Medical History:        Diagnosis Date    AICD (automatic implantable cardioverter defibrillator) at end of life     pt instructed to bring ID card--hx of LBBB     Anxiety and depression     Arthritis     Back pain     CHF (congestive heart failure) (HCC)     GERD (gastroesophageal reflux disease)     History of blood transfusion 2005    Hyperlipidemia     Hypertension     Hypothyroidism     hypothyroidism    S/P cardiac cath 6-2009    Nl LVEF and NL cors/ chronic LBBB now paced    Sleep apnea     pt does not use Cpap machine    Wears glasses        Past Surgical History:        Procedure Laterality Date    COLONOSCOPY  05/11/2016    o'celina--elizabeth 2021     COLONOSCOPY  03/17/2022    Dr Dino Alexandra    COLONOSCOPY N/A 03/17/2022    COLONOSCOPY performed by Dino Alexandra MD at [facility] ENDOSCOPY--NO [address] Bilateral     Tennis elbow    HAMMER TOE SURGERY      x2    JOINT REPLACEMENT Left 12/2008    Left Knee    KNEE ARTHROSCOPY      Rt and Left(2)    KNEE CARTILAGE SURGERY  08/2008    Repair Left    LUMBAR LAMINECTOMY  9-2007(L2-3), (L4-5), 2015    screws    TURP      UPPER GASTROINTESTINAL ENDOSCOPY  03/17/2022    Dr Ryan Lynch dilitation to [address] savary, gastric and esophagus biopsies    UPPER GASTROINTESTINAL ENDOSCOPY N/A 03/17/2022    EGD BIOPSY performed by Dino Alexandra MD at [address] [facility] [address]  03/17/2022    EGD DILATION SAVORY performed by Dino Alexandra MD at [address]       Medications Prior to Admission:    Prior to Admission medications    Medication Sig Start Date End Date Taking?  Authorizing Provider   atorvastatin (LIPITOR) 40 MG tablet TAKE 1 TABLET BY MOUTH AT BEDTIME 12/30/22   Elena Rawls MD   lisinopril (PRINIVIL;ZESTRIL) 5 MG tablet TAKE 1 TABLET BY MOUTH DAILY 12/30/22   Elena Rawls MD   theophylline (AUGUSTINE-24) 300 MG extended release capsule TAKE 1 CAPSULE BY MOUTH DAILY 11/23/22   Eliana Velez DO   citalopram (CELEXA) 40 MG tablet TAKE 1 TABLET BY MOUTH EVERY NIGHT 7/18/22   Jayson Kahn MD   budesonide-formoterol [facility]) 160-4.5 MCG/ACT AERO Inhale 2 puffs into the lungs 2 times daily 5/23/22   Jason Jarquin DO   levothyroxine (SYNTHROID) 50 MCG tablet TAKE 1 TABLET BY MOUTH EVERY NIGHT 4/19/22   Jayson Kahn MD   amitriptyline (ELAVIL) 25 MG tablet TAKE 1 TABLET BY MOUTH EVERY NIGHT AT BEDTIME 4/13/22   Jayson Kahn MD   omeprazole (PRILOSEC) 40 MG delayed release capsule Take 1 capsule by mouth every morning (before breakfast) 3/17/22   Rony Avalos MD   carvedilol (COREG) 12.5 MG tablet TAKE 1 TABLET BY MOUTH TWICE DAILY WITH MEALS 1/19/22   Jayson Kahn MD   aspirin 81 MG chewable tablet Take 1 tablet by mouth daily 11/20/20   Jayson Kahn MD   albuterol sulfate  (90 Base) MCG/ACT inhaler Inhale 2 puffs into the lungs every 6 hours as needed 5/18/20   Historical Provider, MD       Allergies:  Piroxicam and Penicillins    Social History:  The patient currently lives home    TOBACCO:   reports that he quit smoking about 53 years ago. His smoking use included cigarettes. He started smoking about 62 years ago. He has a 2.50 pack-year smoking history. He has never used smokeless tobacco.  ETOH:   reports current alcohol use of about 7.0 standard drinks per week. Family History:  Reviewed in detail and negative for DM, Early CAD, Cancer, CVA. Positive as follows:        Problem Relation Age of Onset    Breast Cancer Mother     Other Father         Peritonitis    Other Daughter         Postpartum Cardiomyopathy       REVIEW OF SYSTEMS:   and as noted in the HPI. All other systems reviewed and negative.     PHYSICAL EXAM:    BP (!) 147/70   Pulse 62   Temp 97.9 °F (36.6 °C) (Oral)   Resp 16   Ht 5' 9\" (1.753 m)   Wt 198 lb 6.6 oz (90 kg)   SpO2 92%   BMI 29.30 kg/m²     General appearance: Currently not on nasal cannula oxygen, alert and oriented x4, pleasant and conversational  HEENT Normal cephalic, atraumatic without obvious deformity. Pupils equal, round, and reactive to light. Extra ocular muscles intact. Mildly dry mucous membranes, anicteric sclera  Neck: Supple, no JVD  Lungs: Diminished breath sounds bilaterally, bilateral basilar and expiratory wheezing noted but very mild  Heart: Regular rate and rhythm, no murmurs at this time detected  Abdomen: Soft, nontender, nondistended, active bowel sounds  Extremities: Trace bilateral lower extremity edema symmetrically noted  Skin: No rashes  Neurologic: Grossly intact neurologically  Mental status: Alert, oriented, thought content appropriate. Capillary Refill: Acceptable  < 3 seconds  Peripheral Pulses: +3 Easily felt, not easily obliterated with pressure    Chest x-ray: Stable chronic changes seen      CBC   Recent Labs     01/28/23 2257   WBC 5.2   HGB 14.0   HCT 41.6         RENAL  Recent Labs     01/28/23 2257      K 3.6      CO2 23   BUN 18   CREATININE 1.2     LFT'S  Recent Labs     01/28/23 2257   AST 24   ALT 22   BILITOT 0.8   ALKPHOS 97     COAG  No results for input(s): INR in the last 72 hours.   CARDIAC ENZYMES  Recent Labs     01/28/23 2257 01/29/23  0258   TROPONINI <0.01 <0.01       U/A:    Lab Results   Component Value Date/Time    COLORU YELLOW 11/18/2020 04:23 AM    CLARITYU Clear 11/18/2020 04:23 AM    SPECGRAV >1.030 11/18/2020 04:23 AM    LEUKOCYTESUR Negative 11/18/2020 04:23 AM    BLOODU Negative 11/18/2020 04:23 AM    GLUCOSEU Negative 11/18/2020 04:23 AM       ABG  No results found for: Michelle Alvarez        Active Hospital Problems    Diagnosis Date Noted    Chest pain [R07.9] 01/29/2023     Priority: Medium    COVID-19 [U07.1] 01/29/2023     Priority: Medium    Chronic obstructive pulmonary disease ([address] [J44.9]     Chronic systolic heart failure ([facility] [address] [I50.22] 10/16/2015   COPD exacerbation      PHYSICIANS CERTIFICATION:    I certify that James De La Cruz is expected to be hospitalized for greater than 2 midnights based on the following assessment and plan:      ASSESSMENT/PLAN:  COPD exacerbation  COVID-19  Chest pain  Chronic systolic heart failure      Plan:  Suspect more likely patient's chest pain is likely secondary due to component of COPD exacerbation exacerbated by COVID, he does have a high cardiac risk score and does have noted previous history of heart failure with ICD pacemaker but at this time I do not feel as though the chest pain is entirely cardiac related  Start patient on Solu-Medrol every 8 as well as Zithromax therapy and Combivent therapy for COPD exacerbation  Positive for COVID but at this time not hypoxic  Initial troponin EKG unremarkable, will trend troponins  Keep patient n.p.o. pending evaluation by cardiology  Cardiology consult for the morning  Repeat labs daily  Obtain transthoracic echo repeat in the morning  Restart patient's other home medications    DVT Prophylaxis: Lovenox  Diet: Diet NPO Exceptions are: Ice Chips, Sips of Water with Meds  Code Status: Full Code  PT/OT Eval Status: Ambulatory    Dispo -pending clinical course       Patrick Paulson DO    Thank you Reji Ribeiro MD for the opportunity to be involved in this patient's care. If you have any questions or concerns please feel free to contact me at [phone].

## 2023-01-29 NOTE — CONSULTS
[address]                                  CONSULTATION    PATIENT NAME: Demario Rahman                 :        1940  MED REC NO:   [id number]                          ROOM:       4107  ACCOUNT NO:   [de-identified]                           ADMIT DATE: 2023  PROVIDER:     Elham Morse MD    CONSULT DATE:  2023    CARDIAC CONSULTATION    HISTORY OF PRESENT ILLNESS:  This is a pleasant 78-year-old male who has  a history of systolic heart failure with Restore EF, status post BiV  ICD, hypertension, COPD. Sees Dr. Amezcua Running in our office on a regular  basis, came to the hospital with symptoms of increasing shortness of  breath as well as chest pain. He was recently on a cruise and he in  fact came back from the cruise only yesterday. He started noticing the  symptoms [place] the sea. He has also noticed some slight fever  with cough with no sputum production. He has no orthopnea or PND. He presented to the emergency room. His EKG shows paced rhythm. His  troponins have been negative. His BNP is also normal.  He was admitted  for further evaluation. The patient was diagnosed with COVID wile he presented to the emergency  room. PAST MEDICAL HISTORY:  1. History of nonischemic cardiomyopathy status post ICD. The patient  has Restore EF by last echocardiogram performed in 2022 with an  episode of left ventricular ejection fraction. 2.  COPD. 3.  Previous history of chest pain. Cardiac care in  showed normal  coronary arteries. 4.  Sleep apnea. PAST SURGICAL HISTORY:  Status post ICD, number of colonoscopies, left  knee replacement, right and left knee arthroscopy, lumbar laminectomy. FAMILY HISTORY:  Negative for any early premature atherosclerosis. SOCIAL HISTORY:  Remote history of smoking. No history of alcohol  abuse.     MEDICATIONS AND ALLERGIES: Have been reviewed. REVIEW OF SYSTEMS:  Please see HPI. All other systems are reviewed and  they are negative. PHYSICAL EXAMINATION:  CONSTITUTIONAL:  He is alert and oriented. VITAL SIGNS:  His pulse is 72 and regular, blood pressure 145/82,  respirations 17, oxygen saturation 96% on room air. HEENT:  Eyes shows no pale conjunctivae or icterus. NECK:  Supple. No JVD. No thyromegaly. No lymphadenopathy. No  carotid bruits. CHEST:  Lungs examination reveals few scattered wheezes. CARDIOVASCULAR:  There was normal S1 and S2. There is no gallop, murmur  or rub appreciated. ABDOMEN:  Soft, nontender. Bowel sounds are present. EXTREMITIES:  Show no edema. SKIN:  Shows no rashes. NEUROLOGIC:  Alert and oriented. Cranial nerves II through XII intact. No focal deficit. LABORATORY DATA:  The patient's sodium is 137, potassium 3.7, chloride  102, bicarb 23, BUN is 17, creatinine 1. Troponins have been negative. The liver functions are within normal limits. The BNP is 107. Chest  x-ray showed no acute cardiopulmonary abnormalities. IMPRESSION:  This is an 64-year-old male who presented with:  1. Increasing shortness of breath and chest pain. This shortness of breath very likely is due to his COVID infection, which may have  exacerbated his COPD. 2.  Chest pain. Chest pain appears atypical for angina. His troponins have been negative and the patient in the past had a normal coronary  angiography. No further workup will be needed. 3.  History of cardiomyopathy status post ICD. The patient currently has no evidence of heart failure. The patient's last echocardiogram showed Restore EF at 55%. 4.  Hypertension. Blood pressure is mildly elevated today, probably related to his current illness. RECOMMENDATION:  1. We will continue the patient on lisinopril and carvedilol therapy.   2.  We will follow the patient's blood pressure and consider increasing either his carvedilol or lisinopril therapy if blood pressure remains  high. 3.  The patient will not require any further cardiac workup at this time unless his symptoms of chest pain and more shortness of breaths persists even after he is COVID-negative    I appreciate the opportunity to participate in the care of this pleasant  male. With warm regards.         Jana Romero MD    D: 01/29/2023 10:33:17       T: 01/29/2023 14:30:46     [email or web address]  Job#: [id number]     Doc#: [id number]    CC:

## 2023-01-30 LAB
ALBUMIN SERPL-MCNC: 3.4 G/DL (ref 3.4–5)
ANION GAP SERPL CALCULATED.3IONS-SCNC: 10 MMOL/L (ref 3–16)
BASOPHILS ABSOLUTE: 0 K/UL (ref 0–0.2)
BASOPHILS RELATIVE PERCENT: 0.1 %
BUN BLDV-MCNC: 27 MG/DL (ref 7–20)
C-REACTIVE PROTEIN: 5.4 MG/L (ref 0–5.1)
CALCIUM SERPL-MCNC: 8.8 MG/DL (ref 8.3–10.6)
CHLORIDE BLD-SCNC: 105 MMOL/L (ref 99–110)
CO2: 25 MMOL/L (ref 21–32)
CREAT SERPL-MCNC: 1.1 MG/DL (ref 0.8–1.3)
D DIMER: 1 UG/ML FEU (ref 0–0.6)
EKG ATRIAL RATE: 67 BPM
EKG DIAGNOSIS: NORMAL
EKG P AXIS: 54 DEGREES
EKG P-R INTERVAL: 118 MS
EKG Q-T INTERVAL: 492 MS
EKG QRS DURATION: 144 MS
EKG QTC CALCULATION (BAZETT): 519 MS
EKG R AXIS: -71 DEGREES
EKG T AXIS: 87 DEGREES
EKG VENTRICULAR RATE: 67 BPM
EOSINOPHILS ABSOLUTE: 0 K/UL (ref 0–0.6)
EOSINOPHILS RELATIVE PERCENT: 0 %
GFR SERPL CREATININE-BSD FRML MDRD: >60 ML/MIN/{1.73_M2}
GLUCOSE BLD-MCNC: 159 MG/DL (ref 70–99)
HCT VFR BLD CALC: 38.3 % (ref 40.5–52.5)
HEMOGLOBIN: 12.9 G/DL (ref 13.5–17.5)
LV EF: 58 %
LVEF MODALITY: NORMAL
LYMPHOCYTES ABSOLUTE: 0.9 K/UL (ref 1–5.1)
LYMPHOCYTES RELATIVE PERCENT: 9.9 %
MAGNESIUM: 1.9 MG/DL (ref 1.8–2.4)
MAGNESIUM: 1.9 MG/DL (ref 1.8–2.4)
MCH RBC QN AUTO: 30.8 PG (ref 26–34)
MCHC RBC AUTO-ENTMCNC: 33.8 G/DL (ref 31–36)
MCV RBC AUTO: 91.2 FL (ref 80–100)
MONOCYTES ABSOLUTE: 0.4 K/UL (ref 0–1.3)
MONOCYTES RELATIVE PERCENT: 4.4 %
NEUTROPHILS ABSOLUTE: 7.6 K/UL (ref 1.7–7.7)
NEUTROPHILS RELATIVE PERCENT: 85.6 %
PDW BLD-RTO: 13.2 % (ref 12.4–15.4)
PHOSPHORUS: 3.4 MG/DL (ref 2.5–4.9)
PLATELET # BLD: 180 K/UL (ref 135–450)
PMV BLD AUTO: 7.8 FL (ref 5–10.5)
POTASSIUM SERPL-SCNC: 3.8 MMOL/L (ref 3.5–5.1)
POTASSIUM SERPL-SCNC: 4 MMOL/L (ref 3.5–5.1)
PROCALCITONIN: 0.06 NG/ML (ref 0–0.15)
RBC # BLD: 4.2 M/UL (ref 4.2–5.9)
SODIUM BLD-SCNC: 140 MMOL/L (ref 136–145)
TROPONIN: <0.01 NG/ML
VITAMIN D 25-HYDROXY: 23.2 NG/ML
WBC # BLD: 8.9 K/UL (ref 4–11)

## 2023-01-30 PROCEDURE — 93010 ELECTROCARDIOGRAM REPORT: CPT | Performed by: INTERNAL MEDICINE

## 2023-01-30 PROCEDURE — 84484 ASSAY OF TROPONIN QUANT: CPT

## 2023-01-30 PROCEDURE — 6370000000 HC RX 637 (ALT 250 FOR IP): Performed by: STUDENT IN AN ORGANIZED HEALTH CARE EDUCATION/TRAINING PROGRAM

## 2023-01-30 PROCEDURE — 85379 FIBRIN DEGRADATION QUANT: CPT

## 2023-01-30 PROCEDURE — 86140 C-REACTIVE PROTEIN: CPT

## 2023-01-30 PROCEDURE — 83735 ASSAY OF MAGNESIUM: CPT

## 2023-01-30 PROCEDURE — 84132 ASSAY OF SERUM POTASSIUM: CPT

## 2023-01-30 PROCEDURE — 84145 PROCALCITONIN (PCT): CPT

## 2023-01-30 PROCEDURE — 94761 N-INVAS EAR/PLS OXIMETRY MLT: CPT

## 2023-01-30 PROCEDURE — 80069 RENAL FUNCTION PANEL: CPT

## 2023-01-30 PROCEDURE — 36415 COLL VENOUS BLD VENIPUNCTURE: CPT

## 2023-01-30 PROCEDURE — 1200000000 HC SEMI PRIVATE

## 2023-01-30 PROCEDURE — 82306 VITAMIN D 25 HYDROXY: CPT

## 2023-01-30 PROCEDURE — 2580000003 HC RX 258: Performed by: STUDENT IN AN ORGANIZED HEALTH CARE EDUCATION/TRAINING PROGRAM

## 2023-01-30 PROCEDURE — 6360000002 HC RX W HCPCS: Performed by: STUDENT IN AN ORGANIZED HEALTH CARE EDUCATION/TRAINING PROGRAM

## 2023-01-30 PROCEDURE — 93005 ELECTROCARDIOGRAM TRACING: CPT | Performed by: STUDENT IN AN ORGANIZED HEALTH CARE EDUCATION/TRAINING PROGRAM

## 2023-01-30 PROCEDURE — 85025 COMPLETE CBC W/AUTO DIFF WBC: CPT

## 2023-01-30 PROCEDURE — 94640 AIRWAY INHALATION TREATMENT: CPT

## 2023-01-30 RX ADMIN — AZITHROMYCIN DIHYDRATE 500 MG: 500 INJECTION, POWDER, LYOPHILIZED, FOR SOLUTION INTRAVENOUS at 01:36

## 2023-01-30 RX ADMIN — AMITRIPTYLINE HYDROCHLORIDE 25 MG: 25 TABLET, FILM COATED ORAL at 22:40

## 2023-01-30 RX ADMIN — MOMETASONE FUROATE AND FORMOTEROL FUMARATE DIHYDRATE 2 PUFF: 200; 5 AEROSOL RESPIRATORY (INHALATION) at 20:08

## 2023-01-30 RX ADMIN — LISINOPRIL 5 MG: 5 TABLET ORAL at 08:42

## 2023-01-30 RX ADMIN — ASPIRIN 81 MG 81 MG: 81 TABLET ORAL at 08:42

## 2023-01-30 RX ADMIN — ATORVASTATIN CALCIUM 80 MG: 80 TABLET, FILM COATED ORAL at 22:40

## 2023-01-30 RX ADMIN — PANTOPRAZOLE SODIUM 40 MG: 40 TABLET, DELAYED RELEASE ORAL at 06:46

## 2023-01-30 RX ADMIN — SODIUM CHLORIDE, PRESERVATIVE FREE 10 ML: 5 INJECTION INTRAVENOUS at 22:41

## 2023-01-30 RX ADMIN — ENOXAPARIN SODIUM 40 MG: 100 INJECTION SUBCUTANEOUS at 08:42

## 2023-01-30 RX ADMIN — SODIUM CHLORIDE, PRESERVATIVE FREE 10 ML: 5 INJECTION INTRAVENOUS at 08:42

## 2023-01-30 RX ADMIN — MOMETASONE FUROATE AND FORMOTEROL FUMARATE DIHYDRATE 2 PUFF: 200; 5 AEROSOL RESPIRATORY (INHALATION) at 07:49

## 2023-01-30 RX ADMIN — METHYLPREDNISOLONE SODIUM SUCCINATE 40 MG: 40 INJECTION, POWDER, FOR SOLUTION INTRAMUSCULAR; INTRAVENOUS at 17:09

## 2023-01-30 RX ADMIN — LEVOTHYROXINE SODIUM 50 MCG: 0.05 TABLET ORAL at 06:46

## 2023-01-30 RX ADMIN — METHYLPREDNISOLONE SODIUM SUCCINATE 40 MG: 40 INJECTION, POWDER, FOR SOLUTION INTRAMUSCULAR; INTRAVENOUS at 01:36

## 2023-01-30 RX ADMIN — METHYLPREDNISOLONE SODIUM SUCCINATE 40 MG: 40 INJECTION, POWDER, FOR SOLUTION INTRAMUSCULAR; INTRAVENOUS at 13:01

## 2023-01-30 RX ADMIN — CITALOPRAM HYDROBROMIDE 40 MG: 20 TABLET ORAL at 08:42

## 2023-01-30 RX ADMIN — CARVEDILOL 6.25 MG: 6.25 TABLET, FILM COATED ORAL at 17:09

## 2023-01-30 NOTE — PROGRESS NOTES
Hospitalist Progress Note      PCP: Edwin Farfan MD    Date of Admission: 1/28/2023    Chief Complaint:   [facility] Course: The patient is a 81 y.o. male with past medical history as below who presents to [facility] with concern that over the last 3 days or so since he was getting off of his most recent cruise and coming back home he apparently started having increasing levels of shortness of breath mainly on exertion initially a few days before coming back from his cruise but it was not excessive until these last 3 days. He did note some increased level of cough and sputum production in addition to the worsening shortness of breath on exertion. He felt as though since the symptoms were getting worse over the last 3 days without provocation and even while he was resting he started feeling increasing levels of these symptoms he was going to come to the ED. In the last day or so however he is also noted some increasing levels of chest pain on exertion which she did not feel was entirely related to his breathing issue. He was concern for this new development of chest pain since he does have a history of CHF as well as has a pacemaker and ICD. Came to the ED for further evaluation for the symptoms. He has had some chills at home but denies any other recent symptoms of fevers, dizziness, syncope, leg swelling more than what is noted, dysuria, blood in urine/stool/sputum, nausea/vomiting/diarrhea/abdominal pain, rashes, loss of bowel or bladder continence, vision change or focal weakness.     Subjective:   Better       Medications:  Reviewed    Infusion Medications    sodium chloride       Scheduled Medications    amitriptyline  25 mg Oral Nightly    mometasone-formoterol  2 puff Inhalation BID    carvedilol  6.25 mg Oral BID WC    citalopram  40 mg Oral Daily    levothyroxine  50 mcg Oral Daily    lisinopril  5 mg Oral Daily    pantoprazole  40 mg Oral QAM AC    sodium chloride flush 5-40 mL IntraVENous 2 times per day    aspirin  81 mg Oral Daily    atorvastatin  80 mg Oral Nightly    enoxaparin  40 mg SubCUTAneous Daily    azithromycin  500 mg IntraVENous Q24H    methylPREDNISolone  40 mg IntraVENous Q8H     PRN Meds: sodium chloride flush, sodium chloride, acetaminophen **OR** acetaminophen, perflutren lipid microspheres, ondansetron, albuterol sulfate HFA **AND** ipratropium, nitroGLYCERIN, guaiFENesin-dextromethorphan      Intake/Output Summary (Last 24 hours) at 1/30/2023 1503  Last data filed at 1/30/2023 0830  Gross per 24 hour   Intake 120 ml   Output 200 ml   Net -80 ml       Physical Exam Performed:    BP (!) 159/73   Pulse 70   Temp 97.8 °F (36.6 °C) (Oral)   Resp 19   Ht 5' 9\" (1.753 m)   Wt 201 lb 1 oz (91.2 kg)   SpO2 95%   BMI 29.69 kg/m²     General appearance: No apparent distress, appears stated age and cooperative. HEENT: Pupils equal, round, and reactive to light. Conjunctivae/corneas clear. Neck: Supple, with full range of motion. No jugular venous distention. Trachea midline. Respiratory:  Normal respiratory effort. Clear to auscultation, bilaterally without Rales/Wheezes/Rhonchi. Cardiovascular: Regular rate and rhythm with normal S1/S2 without murmurs, rubs or gallops. Abdomen: Soft, non-tender, non-distended with normal bowel sounds. Musculoskeletal: No clubbing, cyanosis or edema bilaterally. Full range of motion without deformity. Skin: Skin color, texture, turgor normal.  No rashes or lesions. Neurologic:  Neurovascularly intact without any focal sensory/motor deficits.  Cranial nerves: II-XII intact, grossly non-focal.  Psychiatric: Alert and oriented, thought content appropriate, normal insight  Capillary Refill: Brisk, 3 seconds, normal   Peripheral Pulses: +2 palpable, equal bilaterally       Labs:   Recent Labs     01/28/23  2257 01/29/23  0743 01/30/23  0605   WBC 5.2 3.5* 8.9   HGB 14.0 13.7 12.9*   HCT 41.6 41.0 38.3*    171 180 Recent Labs     01/28/23  2257 01/29/23  0743 01/30/23  0605    137 140   K 3.6 3.7 3.8    102 105   CO2 23 23 25   BUN 18 17 27*   CREATININE 1.2 1.0 1.1   CALCIUM 9.0 8.6 8.8   PHOS  --  3.5 3.4     Recent Labs     01/28/23  2257   AST 24   ALT 22   BILITOT 0.8   ALKPHOS 97     No results for input(s): INR in the last 72 hours. Recent Labs     01/30/23  0139 01/30/23  0605 01/30/23  0816   TROPONINI <0.01 <0.01 <0.01       Urinalysis:      Lab Results   Component Value Date/Time    NITRU Negative 11/18/2020 04:23 AM    BLOODU Negative 11/18/2020 04:23 AM    SPECGRAV >1.030 11/18/2020 04:23 AM    GLUCOSEU Negative 11/18/2020 04:23 AM       Radiology:  XR CHEST PORTABLE   Final Result   Stable chronic changes with no acute abnormality seen. IP CONSULT TO HOSPITALIST  IP CONSULT TO CARDIOLOGY      ASSESSMENT:  COPD exacerbation  COVID 19 pneumonia without respiratory compromise  Atypical chest pain likely from above  Chronic systolic CHF-compensated       PLAN  1.;COVID-19 pathway s  -Does not fulfill criteria for COVID-19 specific therapies /  -Continue supplemental oxygen therapy to keep pulse ox above 90% as needed   -Procalcitonin/D-dimers/CRP  -Pharm consult for Paxlovid    2.   Solu-Medrol 40 mg IV every 8  -DuoNebs every 4  -Azithromycin 500 mg IV daily x3 doses  -Pneumococcal antigen test     3.urine Legionella/pneumococcal antigen test      DVT Prophylaxis: Lovenox  Diet: Diet NPO Exceptions are: Ice Chips, Sips of Water with Meds  Code Status: Full Code  PT/OT Eval Status: Ambulatory     Dispo -pending clinical course    Appropriate for A1 Discharge Unit: No      Min Martin MD

## 2023-01-31 LAB
ALBUMIN SERPL-MCNC: 3.3 G/DL (ref 3.4–5)
ANION GAP SERPL CALCULATED.3IONS-SCNC: 9 MMOL/L (ref 3–16)
BASOPHILS ABSOLUTE: 0 K/UL (ref 0–0.2)
BASOPHILS RELATIVE PERCENT: 0.3 %
BUN BLDV-MCNC: 30 MG/DL (ref 7–20)
CALCIUM SERPL-MCNC: 8.8 MG/DL (ref 8.3–10.6)
CHLORIDE BLD-SCNC: 106 MMOL/L (ref 99–110)
CO2: 26 MMOL/L (ref 21–32)
CREAT SERPL-MCNC: 1 MG/DL (ref 0.8–1.3)
EOSINOPHILS ABSOLUTE: 0 K/UL (ref 0–0.6)
EOSINOPHILS RELATIVE PERCENT: 0 %
GFR SERPL CREATININE-BSD FRML MDRD: >60 ML/MIN/{1.73_M2}
GLUCOSE BLD-MCNC: 148 MG/DL (ref 70–99)
HCT VFR BLD CALC: 40.1 % (ref 40.5–52.5)
HEMOGLOBIN: 13.3 G/DL (ref 13.5–17.5)
L. PNEUMOPHILA SEROGP 1 UR AG: NORMAL
LYMPHOCYTES ABSOLUTE: 1 K/UL (ref 1–5.1)
LYMPHOCYTES RELATIVE PERCENT: 10.2 %
MAGNESIUM: 2.1 MG/DL (ref 1.8–2.4)
MCH RBC QN AUTO: 30.8 PG (ref 26–34)
MCHC RBC AUTO-ENTMCNC: 33.3 G/DL (ref 31–36)
MCV RBC AUTO: 92.5 FL (ref 80–100)
MONOCYTES ABSOLUTE: 0.3 K/UL (ref 0–1.3)
MONOCYTES RELATIVE PERCENT: 3.4 %
NEUTROPHILS ABSOLUTE: 8.6 K/UL (ref 1.7–7.7)
NEUTROPHILS RELATIVE PERCENT: 86.1 %
PDW BLD-RTO: 13.4 % (ref 12.4–15.4)
PHOSPHORUS: 3.5 MG/DL (ref 2.5–4.9)
PLATELET # BLD: 187 K/UL (ref 135–450)
PMV BLD AUTO: 8 FL (ref 5–10.5)
POTASSIUM SERPL-SCNC: 5 MMOL/L (ref 3.5–5.1)
RBC # BLD: 4.34 M/UL (ref 4.2–5.9)
SODIUM BLD-SCNC: 141 MMOL/L (ref 136–145)
STREP PNEUMONIAE ANTIGEN, URINE: NORMAL
WBC # BLD: 10 K/UL (ref 4–11)

## 2023-01-31 PROCEDURE — 1200000000 HC SEMI PRIVATE

## 2023-01-31 PROCEDURE — 94760 N-INVAS EAR/PLS OXIMETRY 1: CPT

## 2023-01-31 PROCEDURE — 80069 RENAL FUNCTION PANEL: CPT

## 2023-01-31 PROCEDURE — 2580000003 HC RX 258: Performed by: STUDENT IN AN ORGANIZED HEALTH CARE EDUCATION/TRAINING PROGRAM

## 2023-01-31 PROCEDURE — 36415 COLL VENOUS BLD VENIPUNCTURE: CPT

## 2023-01-31 PROCEDURE — 83735 ASSAY OF MAGNESIUM: CPT

## 2023-01-31 PROCEDURE — 85025 COMPLETE CBC W/AUTO DIFF WBC: CPT

## 2023-01-31 PROCEDURE — 94640 AIRWAY INHALATION TREATMENT: CPT

## 2023-01-31 PROCEDURE — 87449 NOS EACH ORGANISM AG IA: CPT

## 2023-01-31 PROCEDURE — 6360000002 HC RX W HCPCS: Performed by: STUDENT IN AN ORGANIZED HEALTH CARE EDUCATION/TRAINING PROGRAM

## 2023-01-31 PROCEDURE — 6370000000 HC RX 637 (ALT 250 FOR IP): Performed by: STUDENT IN AN ORGANIZED HEALTH CARE EDUCATION/TRAINING PROGRAM

## 2023-01-31 RX ADMIN — LEVOTHYROXINE SODIUM 50 MCG: 0.05 TABLET ORAL at 07:03

## 2023-01-31 RX ADMIN — AMITRIPTYLINE HYDROCHLORIDE 25 MG: 25 TABLET, FILM COATED ORAL at 22:57

## 2023-01-31 RX ADMIN — MOMETASONE FUROATE AND FORMOTEROL FUMARATE DIHYDRATE 2 PUFF: 200; 5 AEROSOL RESPIRATORY (INHALATION) at 21:55

## 2023-01-31 RX ADMIN — AZITHROMYCIN DIHYDRATE 500 MG: 500 INJECTION, POWDER, LYOPHILIZED, FOR SOLUTION INTRAVENOUS at 03:10

## 2023-01-31 RX ADMIN — SODIUM CHLORIDE, PRESERVATIVE FREE 10 ML: 5 INJECTION INTRAVENOUS at 22:58

## 2023-01-31 RX ADMIN — MOMETASONE FUROATE AND FORMOTEROL FUMARATE DIHYDRATE 2 PUFF: 200; 5 AEROSOL RESPIRATORY (INHALATION) at 07:34

## 2023-01-31 RX ADMIN — PANTOPRAZOLE SODIUM 40 MG: 40 TABLET, DELAYED RELEASE ORAL at 07:03

## 2023-01-31 RX ADMIN — METHYLPREDNISOLONE SODIUM SUCCINATE 40 MG: 40 INJECTION, POWDER, FOR SOLUTION INTRAMUSCULAR; INTRAVENOUS at 09:14

## 2023-01-31 RX ADMIN — METHYLPREDNISOLONE SODIUM SUCCINATE 40 MG: 40 INJECTION, POWDER, FOR SOLUTION INTRAMUSCULAR; INTRAVENOUS at 03:10

## 2023-01-31 RX ADMIN — CARVEDILOL 6.25 MG: 6.25 TABLET, FILM COATED ORAL at 17:44

## 2023-01-31 RX ADMIN — ASPIRIN 81 MG 81 MG: 81 TABLET ORAL at 09:13

## 2023-01-31 RX ADMIN — ENOXAPARIN SODIUM 40 MG: 100 INJECTION SUBCUTANEOUS at 09:14

## 2023-01-31 RX ADMIN — LISINOPRIL 5 MG: 5 TABLET ORAL at 09:13

## 2023-01-31 RX ADMIN — SODIUM CHLORIDE, PRESERVATIVE FREE 10 ML: 5 INJECTION INTRAVENOUS at 09:17

## 2023-01-31 RX ADMIN — ATORVASTATIN CALCIUM 80 MG: 80 TABLET, FILM COATED ORAL at 22:57

## 2023-01-31 RX ADMIN — CITALOPRAM HYDROBROMIDE 40 MG: 20 TABLET ORAL at 09:13

## 2023-01-31 RX ADMIN — METHYLPREDNISOLONE SODIUM SUCCINATE 40 MG: 40 INJECTION, POWDER, FOR SOLUTION INTRAMUSCULAR; INTRAVENOUS at 17:44

## 2023-01-31 RX ADMIN — CARVEDILOL 6.25 MG: 6.25 TABLET, FILM COATED ORAL at 09:21

## 2023-01-31 ASSESSMENT — PAIN SCALES - GENERAL: PAINLEVEL_OUTOF10: 2

## 2023-01-31 NOTE — CARE COORDINATION
DISCHARGE PLAN: Pt plans to return home upon d/c.  Dgtr will transport pt home at d/c. Pt denied the need for any assistance upon d/c.  ____________________________________  INITIAL ASSESSMENT  Spoke w/pt to address barriers to dc. HOME: Pt reported living in a two story home alone. There 4 [address]. Pt reported living on the main level of the home. Disease Specific: COVID    COVID Vaccination: Yes    DME/O2: Pt reported no DME at home. ACTIVE SERVICES: Pt reported being independent with all self care at home. Pt stated his children will sometimes come over and help with laundry. TRANSPORTATION: Pt is an active  and stated his dgtr will transport him home at d/c. PHARMACY: Denies difficulty obtaining/taking meds  Pt uses [facility] on [address]     PCP: Viridiana Mcmullen MD     DEMOGRAPHICS: Verified address/phone number as correct    INSURANCE:  Medicare/[facility]  PRESCRIPTION COVERAGE: Medicare/[facility]    HD/PD: No    THERAPY RECS Not ordered. Pt stated he has no difficult with ambulation. Discharge planning team will remain available for needs. Please consult for any specifics not addressed in this note.     FERCHO Christensen  [phone]  Electronically signed by Jax Dutton on 1/31/2023 at 12:31 PM

## 2023-01-31 NOTE — ACP (ADVANCE CARE PLANNING)
Advance Care Planning     Advance Care Planning Activator (Inpatient)  Conversation Note      Date of ACP Conversation: 1/31/2023     Conversation Conducted with: Patient with Decision Making Capacity    ACP Activator: [address] Decision Maker:     Current Designated Health Care Decision Maker:     Primary Decision Maker: Charisma Morse - Child - [phone]    Secondary Decision Maker: Fabian Mahmood Child - [phone]    Care Preferences    Ventilation: \"If you were in your present state of health and suddenly became very ill and were unable to breathe on your own, what would your preference be about the use of a ventilator (breathing machine) if it were available to you? \"      Would the patient desire the use of ventilator (breathing machine)?: yes    \"If your health worsens and it becomes clear that your chance of recovery is unlikely, what would your preference be about the use of a ventilator (breathing machine) if it were available to you? \"     Would the patient desire the use of ventilator (breathing machine)?: No      Resuscitation  \"CPR works best to restart the heart when there is a sudden event, like a heart attack, in someone who is otherwise healthy. Unfortunately, CPR does not typically restart the heart for people who have serious health conditions or who are very sick. \"    \"In the event your heart stopped as a result of an underlying serious health condition, would you want attempts to be made to restart your heart (answer \"yes\" for attempt to resuscitate) or would you prefer a natural death (answer \"no\" for do not attempt to resuscitate)? \" yes       [] Yes   [x] No   Educated Patient / Sotero Reyna regarding differences between Advance Directives and portable DNR orders.     Length of ACP Conversation in minutes: 5     Conversation Outcomes:  [x] ACP discussion completed  [] Existing advance directive reviewed with patient; no changes to patient's previously recorded wishes  [] New Advance Directive completed  [] Portable Do Not Rescitate prepared for Provider review and signature  [] POLST/POST/MOLST/MOST prepared for Provider review and signature      Follow-up plan:    [] Schedule follow-up conversation to continue planning  [] Referred individual to Provider for additional questions/concerns   [] Advised patient/agent/surrogate to review completed ACP document and update if needed with changes in condition, patient preferences or care setting    [x] This note routed to one or more involved healthcare providers    Electronically signed by Anna Drake on 1/31/2023 at 12:35 PM

## 2023-01-31 NOTE — PROGRESS NOTES
Hospitalist Progress Note      PCP: Philly Forte MD    Date of Admission: 1/28/2023    Chief Complaint:   [facility] Course: The patient is a 81 y.o. male with past medical history of CHF, AICD, GERD, hyperlipidemia, hypertension, hypothyroidism and sleep apnea amongst other medical conditions. Admitted for shortness of breath on exertion for 3 days assoc with a productive cough. Also getting exertional chest pain on exertion without fevers, dizziness, syncope, leg swelling, dysuria, hematuria, nausea/vomiting/diarrhea/abdominal pain, rashes, loss of bowel or bladder continence, vision change or focal weakness. Subjective:   Better       Medications:  Reviewed    Infusion Medications    sodium chloride       Scheduled Medications    amitriptyline  25 mg Oral Nightly    mometasone-formoterol  2 puff Inhalation BID    carvedilol  6.25 mg Oral BID WC    citalopram  40 mg Oral Daily    levothyroxine  50 mcg Oral Daily    lisinopril  5 mg Oral Daily    pantoprazole  40 mg Oral QAM AC    sodium chloride flush  5-40 mL IntraVENous 2 times per day    aspirin  81 mg Oral Daily    atorvastatin  80 mg Oral Nightly    enoxaparin  40 mg SubCUTAneous Daily    methylPREDNISolone  40 mg IntraVENous Q8H     PRN Meds: sodium chloride flush, sodium chloride, acetaminophen **OR** acetaminophen, perflutren lipid microspheres, ondansetron, albuterol sulfate HFA **AND** ipratropium, nitroGLYCERIN, guaiFENesin-dextromethorphan      Intake/Output Summary (Last 24 hours) at 1/31/2023 1050  Last data filed at 1/31/2023 0704  Gross per 24 hour   Intake 90 ml   Output --   Net 90 ml         Physical Exam Performed:    BP (!) 153/86   Pulse 64   Temp 98.4 °F (36.9 °C) (Oral)   Resp 16   Ht 5' 9\" (1.753 m)   Wt 195 lb 5.2 oz (88.6 kg)   SpO2 97%   BMI 28.84 kg/m²     General appearance: No apparent distress, appears stated age and cooperative. HEENT: Pupils equal, round, and reactive to light. Conjunctivae/corneas clear. Neck: Supple, with full range of motion. No jugular venous distention. Trachea midline. Respiratory:  Normal respiratory effort. Clear to auscultation, bilaterally without Rales/Wheezes/Rhonchi. Cardiovascular: Regular rate and rhythm with normal S1/S2 without murmurs, rubs or gallops. Abdomen: Soft, non-tender, non-distended with normal bowel sounds. Musculoskeletal: No clubbing, cyanosis or edema bilaterally. Full range of motion without deformity. Skin: Skin color, texture, turgor normal.  No rashes or lesions. Neurologic:  Neurovascularly intact without any focal sensory/motor deficits. Cranial nerves: II-XII intact, grossly non-focal.  Psychiatric: Alert and oriented, thought content appropriate, normal insight  Capillary Refill: Brisk, 3 seconds, normal   Peripheral Pulses: +2 palpable, equal bilaterally       Labs:   Recent Labs     01/29/23  0743 01/30/23  0605 01/31/23  0809   WBC 3.5* 8.9 10.0   HGB 13.7 12.9* 13.3*   HCT 41.0 38.3* 40.1*    180 187       Recent Labs     01/29/23  0743 01/30/23  0605 01/30/23  0816 01/31/23  0809    140  --  141   K 3.7 3.8 4.0 5.0    105  --  106   CO2 23 25  --  26   BUN 17 27*  --  30*   CREATININE 1.0 1.1  --  1.0   CALCIUM 8.6 8.8  --  8.8   PHOS 3.5 3.4  --  3.5       Recent Labs     01/28/23  2257   AST 24   ALT 22   BILITOT 0.8   ALKPHOS 97       No results for input(s): INR in the last 72 hours. Recent Labs     01/30/23  0139 01/30/23  0605 01/30/23  0816   TROPONINI <0.01 <0.01 <0.01         Urinalysis:      Lab Results   Component Value Date/Time    NITRU Negative 11/18/2020 04:23 AM    BLOODU Negative 11/18/2020 04:23 AM    SPECGRAV >1.030 11/18/2020 04:23 AM    GLUCOSEU Negative 11/18/2020 04:23 AM       Radiology:  XR CHEST PORTABLE   Final Result   Stable chronic changes with no acute abnormality seen.              IP CONSULT TO HOSPITALIST  IP CONSULT TO CARDIOLOGY  IP CONSULT TO PHARMACY      ASSESSMENT:  COPD exacerbation  COVID 19 pneumonia without respiratory compromise  Atypical chest pain likely from above  Chronic systolic CHF-compensated       PLAN  1.;COVID-19 pathway s  -Does not fulfill criteria for COVID-19 specific therapies /  -Continue supplemental oxygen therapy to keep pulse ox above 90% as needed   -Procalcitonin/D-dimers/CRP  -Pharm consult for Paxlovid    2.   Solu-Medrol 40 mg IV every 8  -DuoNebs every 4  -Azithromycin 500 mg IV daily x3 doses  -Pneumococcal antigen test     3.urine Legionella/pneumococcal antigen test      DVT Prophylaxis: Lovenox  Diet: Diet NPO Exceptions are: Ice Chips, Sips of Water with Meds  Code Status: Full Code  PT/OT Eval Status: Ambulatory     Dispo -pending clinical course    Appropriate for A1 Discharge Unit: No      Hao Rich MD

## 2023-02-01 VITALS
SYSTOLIC BLOOD PRESSURE: 138 MMHG | WEIGHT: 209.66 LBS | TEMPERATURE: 97.8 F | BODY MASS INDEX: 31.05 KG/M2 | OXYGEN SATURATION: 98 % | HEIGHT: 69 IN | DIASTOLIC BLOOD PRESSURE: 70 MMHG | RESPIRATION RATE: 18 BRPM | HEART RATE: 82 BPM

## 2023-02-01 LAB
ALBUMIN SERPL-MCNC: 3.1 G/DL (ref 3.4–5)
ANION GAP SERPL CALCULATED.3IONS-SCNC: 9 MMOL/L (ref 3–16)
BASOPHILS ABSOLUTE: 0 K/UL (ref 0–0.2)
BASOPHILS RELATIVE PERCENT: 0 %
BUN BLDV-MCNC: 27 MG/DL (ref 7–20)
CALCIUM SERPL-MCNC: 8.4 MG/DL (ref 8.3–10.6)
CHLORIDE BLD-SCNC: 106 MMOL/L (ref 99–110)
CO2: 25 MMOL/L (ref 21–32)
CREAT SERPL-MCNC: 1 MG/DL (ref 0.8–1.3)
EOSINOPHILS ABSOLUTE: 0 K/UL (ref 0–0.6)
EOSINOPHILS RELATIVE PERCENT: 0 %
GFR SERPL CREATININE-BSD FRML MDRD: >60 ML/MIN/{1.73_M2}
GLUCOSE BLD-MCNC: 124 MG/DL (ref 70–99)
HCT VFR BLD CALC: 37.8 % (ref 40.5–52.5)
HEMOGLOBIN: 12.2 G/DL (ref 13.5–17.5)
LYMPHOCYTES ABSOLUTE: 1.1 K/UL (ref 1–5.1)
LYMPHOCYTES RELATIVE PERCENT: 10.6 %
MAGNESIUM: 1.9 MG/DL (ref 1.8–2.4)
MCH RBC QN AUTO: 29.7 PG (ref 26–34)
MCHC RBC AUTO-ENTMCNC: 32.3 G/DL (ref 31–36)
MCV RBC AUTO: 91.9 FL (ref 80–100)
MONOCYTES ABSOLUTE: 0.6 K/UL (ref 0–1.3)
MONOCYTES RELATIVE PERCENT: 5.6 %
NEUTROPHILS ABSOLUTE: 8.7 K/UL (ref 1.7–7.7)
NEUTROPHILS RELATIVE PERCENT: 83.8 %
PDW BLD-RTO: 13.3 % (ref 12.4–15.4)
PHOSPHORUS: 3 MG/DL (ref 2.5–4.9)
PLATELET # BLD: 194 K/UL (ref 135–450)
PMV BLD AUTO: 7.8 FL (ref 5–10.5)
POTASSIUM SERPL-SCNC: 3.8 MMOL/L (ref 3.5–5.1)
RBC # BLD: 4.12 M/UL (ref 4.2–5.9)
SODIUM BLD-SCNC: 140 MMOL/L (ref 136–145)
WBC # BLD: 10.4 K/UL (ref 4–11)

## 2023-02-01 PROCEDURE — 83735 ASSAY OF MAGNESIUM: CPT

## 2023-02-01 PROCEDURE — 6360000002 HC RX W HCPCS: Performed by: STUDENT IN AN ORGANIZED HEALTH CARE EDUCATION/TRAINING PROGRAM

## 2023-02-01 PROCEDURE — 97165 OT EVAL LOW COMPLEX 30 MIN: CPT

## 2023-02-01 PROCEDURE — 2580000003 HC RX 258: Performed by: STUDENT IN AN ORGANIZED HEALTH CARE EDUCATION/TRAINING PROGRAM

## 2023-02-01 PROCEDURE — 94760 N-INVAS EAR/PLS OXIMETRY 1: CPT

## 2023-02-01 PROCEDURE — 97535 SELF CARE MNGMENT TRAINING: CPT

## 2023-02-01 PROCEDURE — 97161 PT EVAL LOW COMPLEX 20 MIN: CPT

## 2023-02-01 PROCEDURE — 6370000000 HC RX 637 (ALT 250 FOR IP): Performed by: STUDENT IN AN ORGANIZED HEALTH CARE EDUCATION/TRAINING PROGRAM

## 2023-02-01 PROCEDURE — 80069 RENAL FUNCTION PANEL: CPT

## 2023-02-01 PROCEDURE — 94640 AIRWAY INHALATION TREATMENT: CPT

## 2023-02-01 PROCEDURE — 97116 GAIT TRAINING THERAPY: CPT

## 2023-02-01 PROCEDURE — 36415 COLL VENOUS BLD VENIPUNCTURE: CPT

## 2023-02-01 PROCEDURE — 85025 COMPLETE CBC W/AUTO DIFF WBC: CPT

## 2023-02-01 RX ORDER — DEXAMETHASONE 6 MG/1
6 TABLET ORAL
Qty: 5 TABLET | Refills: 0 | Status: SHIPPED | OUTPATIENT
Start: 2023-02-01 | End: 2023-02-06

## 2023-02-01 RX ORDER — GUAIFENESIN/DEXTROMETHORPHAN 100-10MG/5
5 SYRUP ORAL EVERY 4 HOURS PRN
Qty: 120 ML | Refills: 0 | Status: SHIPPED | OUTPATIENT
Start: 2023-02-01 | End: 2023-02-11

## 2023-02-01 RX ADMIN — ENOXAPARIN SODIUM 40 MG: 100 INJECTION SUBCUTANEOUS at 08:59

## 2023-02-01 RX ADMIN — LEVOTHYROXINE SODIUM 50 MCG: 0.05 TABLET ORAL at 06:46

## 2023-02-01 RX ADMIN — MOMETASONE FUROATE AND FORMOTEROL FUMARATE DIHYDRATE 2 PUFF: 200; 5 AEROSOL RESPIRATORY (INHALATION) at 09:04

## 2023-02-01 RX ADMIN — PANTOPRAZOLE SODIUM 40 MG: 40 TABLET, DELAYED RELEASE ORAL at 06:46

## 2023-02-01 RX ADMIN — ASPIRIN 81 MG 81 MG: 81 TABLET ORAL at 08:58

## 2023-02-01 RX ADMIN — SODIUM CHLORIDE, PRESERVATIVE FREE 5 ML: 5 INJECTION INTRAVENOUS at 08:59

## 2023-02-01 RX ADMIN — METHYLPREDNISOLONE SODIUM SUCCINATE 40 MG: 40 INJECTION, POWDER, FOR SOLUTION INTRAMUSCULAR; INTRAVENOUS at 06:49

## 2023-02-01 RX ADMIN — LISINOPRIL 5 MG: 5 TABLET ORAL at 08:58

## 2023-02-01 RX ADMIN — CITALOPRAM HYDROBROMIDE 40 MG: 20 TABLET ORAL at 08:58

## 2023-02-01 RX ADMIN — METHYLPREDNISOLONE SODIUM SUCCINATE 40 MG: 40 INJECTION, POWDER, FOR SOLUTION INTRAMUSCULAR; INTRAVENOUS at 12:14

## 2023-02-01 RX ADMIN — CARVEDILOL 6.25 MG: 6.25 TABLET, FILM COATED ORAL at 08:58

## 2023-02-01 ASSESSMENT — PAIN SCALES - GENERAL
PAINLEVEL_OUTOF10: 0
PAINLEVEL_OUTOF10: 0

## 2023-02-01 NOTE — PROGRESS NOTES
Physical Therapy  Facility/Department: [facility] MED SURG  Physical Therapy Initial and Discharge Assessment    Name: Meli Romero  : [id number]  MRN: [id number]  Date of Service: 2023    Discharge Recommendations:  Home with assist PRN, No therapy recommended at discharge   Meli Romero scored a 24/24 on the AM-PAC short mobility form. At this time, no further PT is recommended upon discharge due to no needs. Recommend patient returns to prior setting with prior services. PT Equipment Recommendations  Equipment Needed: No      Patient Diagnosis(es): The primary encounter diagnosis was Chest pain with high risk of acute coronary syndrome. Diagnoses of Acute bronchitis due to COVID-19 virus and COPD with acute exacerbation ([address] were also pertinent to this visit. Past Medical History:  has a past medical history of AICD (automatic implantable cardioverter defibrillator) at end of life, Anxiety and depression, Arthritis, Back pain, CHF (congestive heart failure) ([address] GERD (gastroesophageal reflux disease), History of blood transfusion, Hyperlipidemia, Hypertension, Hypothyroidism, S/P cardiac cath, Sleep apnea, and Wears glasses. Past Surgical History:  has a past surgical history that includes lumbar laminectomy ((L2-3), (L4-5), ); Knee arthroscopy; Knee cartilage surgery (2008); Elbow surgery (Bilateral); Hammer toe surgery; joint replacement (Left, 2008); TURP; Colonoscopy (2016); Upper gastrointestinal endoscopy (2022); Colonoscopy (2022); Colonoscopy (N/A, 2022); Upper gastrointestinal endoscopy (N/A, 2022); and Upper gastrointestinal endoscopy (2022). Assessment   Assessment: The pt is an 82 yo male who presented to the ED with SOB and a productive cough. The pt was found to be positive for covid-19. The pt lives alone on the main floor of a house.  PTA, he was indep in mobility w/o a device, indep in self0-care and gets assist for laundry and cleaning. He still drives and goes out every day. PMHx: CHF, AICD, GERD, hyperlipidemia, hypertension, hypothyroidism and sleep apnea, COPD      Today, the pt demonstrated that he is functioning close to his baseline and was able to walk w/o a device in his room indep'ly. The pt had no LOB and was just minimally OOB with activity. Anticipate that the pt will be safe for home and will need no further skilled PT services. Therapy Prognosis: Good  Decision Making: Low Complexity  Barriers to Learning: none  Requires PT Follow-Up: No  Activity Tolerance  Activity Tolerance: Patient tolerated evaluation without incident     Plan   Physcial Therapy Plan  Additional Comments: d/c from acute care PT services  Safety Devices  Type of Devices: Call light within reach, Left in chair, Nurse notified (the pt up ad lily)     Restrictions  Restrictions/Precautions  Restrictions/Precautions: Up Ad Lily, Contact Precautions, Isolation  Position Activity Restriction  Other position/activity restrictions: covid-19 droplet precautions     Subjective   General  Chart Reviewed: Yes  Additional Pertinent Hx: Per Mahlon Apley, MD progress note from 1-: The pt is an 80 yo male who presented to the ED with SOB and a productive cough. The pt was found to be positive for covid-19.        PMHx: CHF, AICD, GERD, hyperlipidemia, hypertension, hypothyroidism and sleep apnea, COPD  Response To Previous Treatment: Not applicable  Family / Caregiver Present: No  Referring Practitioner: Johnie Castanon MD  Referral Date : 02/01/23  Diagnosis: covid-19, COPD exacerbation  Follows Commands: Within Functional Limits  Subjective  Subjective: the pt was found laying on his side in the bed; he was pleasant and agreeable to therapy; denies pain         Social/Functional History  Social/Functional History  Lives With: Alone  Type of Home: House  Home Layout: Two level, Able to Live on Main level with bedroom/bathroom, Laundry in basement (pt does not go upstairs; 12 OSMEL to basement with handrails for laundry)  Home Access: Stairs to enter with rails  Entrance Stairs - Number of Steps: 4  Entrance Stairs - Rails: Right  Bathroom Shower/Tub: Tub/Shower unit  Bathroom Toilet: Handicap height (vanity nearby)  [employer]: Grab bars in shower, Hand-held shower  Bathroom Accessibility: Walker accessible  Home Equipment:  (no DME)  Receives Help From: Family (dgt)  ADL Assistance: Independent  Homemaking Assistance: Needs assistance (children assist with laundry sometimes. Pt independent grocery shopping and cooking with microwave.  1x/month for cleaning.)  Ambulation Assistance: Independent (no device)  Transfer Assistance: Independent  Active : Yes  Occupation: Retired  Type of Occupation: insurance company, board of [employer]  Leisure & Hobbies: walking, getting out of the house  IADL Comments: pt sleeps in regular bed  Vision/Hearing  Vision  Vision: Within Functional Limits  Vision Exceptions: Wears glasses for reading  Hearing  Hearing: Within functional limits    Cognition   Cognition  Overall Cognitive Status: WFL     Objective           Gross Assessment  AROM: Within functional limits  PROM: Within functional limits  Strength:  Within functional limits  Tone: Normal  Sensation: Intact       Bed mobility  Supine to Sit: Independent  Scooting: Independent  Transfers  Sit to Stand: Independent  Stand to Sit: Independent  Ambulation  Surface: Level tile  Device: No Device  Assistance: Independent  Quality of Gait: the pt demonstrated that he is able to walk in the room setting without a device indep'ly; he can change direction and walk around objects w/o LOB  Distance: 50 feet x 1 in the room  More Ambulation?: No  Stairs/Curb  Stairs?: No     Balance  Posture: Fair  Sitting - Static: Good  Sitting - Dynamic: Good  Standing - Static: Good  Standing - Dynamic: Good           AM-PAC Score  AM-PAC Inpatient Mobility Raw Score : 24 (02/01/23 1535)  AM-PAC Inpatient T-Scale Score : 61.14 (02/01/23 1535)  Mobility Inpatient CMS 0-100% Score: 0 (02/01/23 1535)  Mobility Inpatient CMS G-Code Modifier : CH (02/01/23 1535)         Goals  Short Term Goals  Time Frame for Short Term Goals: no acute care PT goals identified  Patient Goals   Patient Goals : to go home       Education  Patient Education  Education Given To: Patient  Education Provided: Role of Therapy  Education Method: Verbal  Barriers to Learning: None  Education Outcome: Verbalized understanding;Demonstrated understanding      Therapy Time   Individual Concurrent Group Co-treatment   Time In 1504         Time Out 1535         Minutes 31         Timed Code Treatment Minutes: 16 Minutes     Electronically signed by Dorina Khan, PT [id number] on 2/1/2023 at 3:42 PM

## 2023-02-01 NOTE — CARE COORDINATION
Discharge Planning:  SW spoke with pt re: d/c plan. Pt reported feeling better and plan remains to return home at d/c.  Provided to patient . .. by Electronically signed by Erin Espinoza on 2/1/2023 at 10:47 AM. Education provided to patient, patient reported no questions and verbalized understanding. Patient aware of 4 hours allotted time to determine if they choose to pursue Medicare appeal process. PLAN: Pt plans to return home upon d/c.  Dgtr will transport pt home at d/c.   Pt denied the need for any assistance upon d/c.  ____________________________________  FER[facility]  [phone]  Electronically signed by Erin Espinoza on 2/1/2023 at 10:47 AM

## 2023-02-01 NOTE — PLAN OF CARE
Problem: Discharge Planning  Goal: Discharge to home or other facility with appropriate resources  2/1/2023 0124 by Jovon Parada RN  Outcome: Progressing  1/31/2023 1139 by Zackary Liu RN  Outcome: Progressing     Problem: Pain  Goal: Verbalizes/displays adequate comfort level or baseline comfort level  2/1/2023 0124 by Jovon Parada RN  Outcome: Progressing  1/31/2023 1139 by Zackary Liu RN  Outcome: Progressing     Problem: ABCDS Injury Assessment  Goal: Absence of physical injury  2/1/2023 0124 by Jovon Parada RN  Outcome: Progressing  1/31/2023 1139 by Zackary Liu RN  Outcome: Progressing     Problem: Infection - Adult  Goal: Absence of infection at discharge  Outcome: Progressing  Goal: Absence of infection during hospitalization  Outcome: Progressing  Goal: Absence of fever/infection during anticipated neutropenic period  Outcome: Progressing

## 2023-02-01 NOTE — DISCHARGE SUMMARY
Hospital Medicine Discharge Summary    Patient ID: Annie Lanza      Patient's PCP: Florin Robles MD    Admit Date: 1/28/2023     Discharge Date:   2/1/2023    Admitting Physician: Maria Dolores Yeh DO     Discharge Physician: Joya Fowler MD     Discharge Diagnoses: Active Hospital Problems    Diagnosis Date Noted    Chest pain with high risk of acute coronary syndrome [R07.9] 01/29/2023     Priority: Medium    COVID-19 [U07.1] 01/29/2023     Priority: Medium    S/P ICD (internal cardiac defibrillator) procedure [Z95.810]     Primary hypertension [I10]     Chronic obstructive pulmonary disease ([facility] [address] [J44.9]     Chronic systolic heart failure ([facility] [address] [I50.22] 10/16/2015       The patient was seen and examined on day of discharge and this discharge summary is in conjunction with any daily progress note from day of discharge. Hospital Course: The patient is a 81 y.o. male with past medical history of CHF, AICD, GERD, hyperlipidemia, hypertension, hypothyroidism and sleep apnea amongst other medical conditions. Admitted for shortness of breath on exertion for 3 days assoc with a productive cough. Also getting exertional chest pain on exertion without fevers, dizziness, syncope, leg swelling, dysuria, hematuria, nausea/vomiting/diarrhea/abdominal pain, rashes, loss of bowel or bladder continence, vision change or focal weakness.       COVID-19  -not on supplemental oxgyen  -no specific therapies indicated  -unable to give Paxlovid inpatient  -patient may contact PCP if he remains symptomatic and feels like he would benefit form Paxlovid    COPD exacerbation  -treated with IV Solumedrol, Duonebs and IV Azithromycin  -continue Dulera BID  -d/c on PO Decadron    Atypical chest pain - resolved    Chronic systolic CHF - compensated  -continue home meds    Hypothyroidism  -continue home levothyroxine    GERD  -PPI    Hyperlipidemia  -continue home Lipitor    Essential hypertension  -continue home meds    Anxiety and depression  -continue home meds    Exam:     /70   Pulse 82   Temp 97.8 °F (36.6 °C) (Oral)   Resp 18   Ht 5' 9\" (1.753 m)   Wt 209 lb 10.5 oz (95.1 kg)   SpO2 98%   BMI 30.96 kg/m²       General appearance:  No apparent distress, appears stated age and cooperative. HEENT:  Normal cephalic, atraumatic without obvious deformity. Pupils equal, round, and reactive to light. Extra ocular muscles intact. Conjunctivae/corneas clear. Neck: Supple, with full range of motion. No jugular venous distention. Trachea midline. Respiratory:  Normal respiratory effort. Clear to auscultation, bilaterally without Rales/Wheezes/Rhonchi. Cardiovascular:  Regular rate and rhythm with normal S1/S2 without murmurs, rubs or gallops. Abdomen: Soft, non-tender, non-distended with normal bowel sounds. Musculoskeletal:  No clubbing, cyanosis or edema bilaterally. Full range of motion without deformity. Skin: Skin color, texture, turgor normal.  No rashes or lesions. Neurologic:  Neurovascularly intact without any focal sensory/motor deficits. Cranial nerves: II-XII intact, grossly non-focal.  Psychiatric:  Alert and oriented, thought content appropriate, normal insight  Capillary Refill: Brisk,< 3 seconds   Peripheral Pulses: +2 palpable, equal bilaterally       Labs:  For convenience and continuity at follow-up the following most recent labs are provided:      CBC:    Lab Results   Component Value Date/Time    WBC 10.4 02/01/2023 04:41 AM    HGB 12.2 02/01/2023 04:41 AM    HCT 37.8 02/01/2023 04:41 AM     02/01/2023 04:41 AM       Renal:    Lab Results   Component Value Date/Time     02/01/2023 04:41 AM    K 3.8 02/01/2023 04:41 AM    K 3.6 01/28/2023 10:57 PM     02/01/2023 04:41 AM    CO2 25 02/01/2023 04:41 AM    BUN 27 02/01/2023 04:41 AM    CREATININE 1.0 02/01/2023 04:41 AM    CALCIUM 8.4 02/01/2023 04:41 AM    PHOS 3.0 02/01/2023 04:41 AM         Significant Diagnostic Studies    Radiology:   XR CHEST PORTABLE   Final Result   Stable chronic changes with no acute abnormality seen.                 Consults:     IP CONSULT TO HOSPITALIST  IP CONSULT TO CARDIOLOGY    Disposition:  home     Condition at Discharge: Stable    Discharge Instructions/Follow-up:  meds as prescribed, follow-up with PCP    Code Status:  Full Code     Activity: activity as tolerated    Diet: regular diet      Discharge Medications:     Current Discharge Medication List             Details   guaiFENesin-dextromethorphan (ROBITUSSIN DM) 100-10 MG/5ML syrup Take 5 mLs by mouth every 4 hours as needed for Cough  Qty: 120 mL, Refills: 0      dexamethasone (DECADRON) 6 MG tablet Take 1 tablet by mouth daily (with breakfast) for 5 days  Qty: 5 tablet, Refills: 0                Details   atorvastatin (LIPITOR) 40 MG tablet TAKE 1 TABLET BY MOUTH AT BEDTIME  Qty: 90 tablet, Refills: 0      lisinopril (PRINIVIL;ZESTRIL) 5 MG tablet TAKE 1 TABLET BY MOUTH DAILY  Qty: 90 tablet, Refills: 3      theophylline (AUGUSTINE-24) 300 MG extended release capsule TAKE 1 CAPSULE BY MOUTH DAILY  Qty: 90 capsule, Refills: 3    Comments: **Patient requests 90 days supply**  Associated Diagnoses: Uncomplicated asthma, unspecified asthma severity, unspecified whether persistent      citalopram (CELEXA) 40 MG tablet TAKE 1 TABLET BY MOUTH EVERY NIGHT  Qty: 90 tablet, Refills: 2      budesonide-formoterol (SYMBICORT) 160-4.5 MCG/ACT AERO Inhale 2 puffs into the lungs 2 times daily  Qty: 1 each, Refills: 11    Associated Diagnoses: Severe asthma without complication, unspecified whether persistent      levothyroxine (SYNTHROID) 50 MCG tablet TAKE 1 TABLET BY MOUTH EVERY NIGHT  Qty: 90 tablet, Refills: 3      amitriptyline (ELAVIL) 25 MG tablet TAKE 1 TABLET BY MOUTH EVERY NIGHT AT BEDTIME  Qty: 90 tablet, Refills: 5      omeprazole (PRILOSEC) 40 MG delayed release capsule Take 1 capsule by mouth every morning (before breakfast)  Qty: 90 capsule, Refills: 1      carvedilol (COREG) 12.5 MG tablet TAKE 1 TABLET BY MOUTH TWICE DAILY WITH MEALS  Qty: 180 tablet, Refills: 3      aspirin 81 MG chewable tablet Take 1 tablet by mouth daily  Qty: 30 tablet, Refills: 3      albuterol sulfate  (90 Base) MCG/ACT inhaler Inhale 2 puffs into the lungs every 6 hours as needed             Time Spent on discharge is more than 30 minutes in the examination, evaluation, counseling and review of medications and discharge plan. Signed:    Henrry Cosby MD   2/1/2023      Thank you Raghu Kaur MD for the opportunity to be involved in this patient's care. If you have any questions or concerns please feel free to contact me at [phone].

## 2023-02-01 NOTE — PROGRESS NOTES
Occupational Therapy  [facility]  Occupational Therapy Initial Assessment and Discharge    Name: Mckayla Armstrong  :   MRN: [id number]  Date of Service: 2023    Discharge Recommendations:  Home with assist PRN  OT Equipment Recommendations  Equipment Needed: Yes  Mobility Devices: ADL Assistive Devices  ADL Assistive Devices: Shower Chair with back    Mckayla Armstrong scored a 24/24 on the AM-PAC ADL Inpatient form. At this time, no further OT is recommended upon discharge due to pt functioning at near/baseline. Recommend patient returns to prior setting with prior services. Patient Diagnosis(es): The primary encounter diagnosis was Chest pain with high risk of acute coronary syndrome. Diagnoses of Acute bronchitis due to COVID-19 virus and COPD with acute exacerbation ([address] were also pertinent to this visit. Past Medical History:  has a past medical history of AICD (automatic implantable cardioverter defibrillator) at end of life, Anxiety and depression, Arthritis, Back pain, CHF (congestive heart failure) ([address] GERD (gastroesophageal reflux disease), History of blood transfusion, Hyperlipidemia, Hypertension, Hypothyroidism, S/P cardiac cath, Sleep apnea, and Wears glasses. Past Surgical History:  has a past surgical history that includes lumbar laminectomy ((L2-3), (L4-5), ); Knee arthroscopy; Knee cartilage surgery (2008); Elbow surgery (Bilateral); Hammer toe surgery; joint replacement (Left, 2008); TURP; Colonoscopy (2016); Upper gastrointestinal endoscopy (2022); Colonoscopy (2022); Colonoscopy (N/A, 2022); Upper gastrointestinal endoscopy (N/A, 2022); and Upper gastrointestinal endoscopy (2022). Assessment   Assessment: Pt presents to be functioning at/near baseline, and does not require any additional acute OT.  Pt UAL in room completing ADLs and fxl mobility independently, and safe to return home with family's assist prn. Pt in agreement he is functioning at baseline, denies need for OT services. No acute OT goals identified, will discharge. Prognosis: Good  Decision Making: Low Complexity  History: see above  REQUIRES OT FOLLOW-UP: No  Activity Tolerance  Activity Tolerance: Patient Tolerated treatment well        Plan   Occupational Therapy Plan  Times Per Week: d/c acute OT     Restrictions  Restrictions/Precautions  Restrictions/Precautions: Up Ad Lily, Contact Precautions, Isolation  Position Activity Restriction  Other position/activity restrictions: covid-19 droplet precautions    Subjective   General  Chart Reviewed: Yes  Additional Pertinent Hx: per Timbo Childs MD's cardiology note 1/29/2023: \"This is a pleasant 81-year-old male who has  a history of systolic heart failure with Restore EF, status post BiV  ICD, hypertension, COPD. Sees Dr. Zonia Gifford in our office on a regular  basis, came to the hospital with symptoms of increasing shortness of  breath as well as chest pain. He was recently on a cruise and he in  fact came back from the cruise only yesterday. He started noticing the  symptoms [place] the [place]. He has also noticed some slight fever  with cough with no sputum production. He has no orthopnea or PND. He presented to the emergency room. His EKG shows paced rhythm. His  troponins have been negative. His BNP is also normal.  He was admitted  for further evaluation. The patient was diagnosed with COVID wile he presented to the emergency  room. \"  Family / Caregiver Present: No  Referring Practitioner: Colletta Smiles, MD  Subjective  Subjective: Pt met b/s for OT eval/tx. Pt in bed on arrival, agreeable to participate in therapy. Pt denies pain. Pt reports SOB is slightly worse than baseline.      Social/Functional History  Social/Functional History  Lives With: Alone  Type of Home: House  Home Layout: Two level, Able to Live on Main level with bedroom/bathroom, Laundry in basement (pt does not go upstairs; 12 OSMEL to basement with handrails for laundry)  Home Access: Stairs to enter with rails  Entrance Stairs - Number of Steps: 4  Entrance Stairs - Rails: Right  Bathroom Shower/Tub: Tub/Shower unit  Bathroom Toilet: Handicap height (vanity nearby)  [employer]: Grab bars in shower, Hand-held shower  Bathroom Accessibility: Walker accessible  Home Equipment:  (no DME)  Receives Help From: Family (dgt)  ADL Assistance: Independent  Homemaking Assistance: Needs assistance (children assist with laundry sometimes. Pt independent grocery shopping and cooking with microwave.  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  Leisure & Hobbies: walking, getting out of the house  IADL Comments: pt sleeps in regular bed       Objective     Safety Devices  Type of Devices: Call light within reach; Left in chair;Nurse notified (the pt up ad flores)    Transfers  Sit to stand: Independent  Stand to sit: Independent  Toilet Transfers  Toilet - Technique: Ambulating  Equipment Used: Grab bars  Toilet Transfer: Modified independent    AROM: Within functional limits  PROM: Within functional limits  Strength:  Within functional limits  Coordination: Within functional limits  Tone: Normal  Sensation: Intact (pt denies N/T B UE's)    ADL  LE Bathing Skilled Clinical Factors: discussed shower chair for energy conservation and pt verbalized understanding  UB dressing: Independent (to don undershirt and shirt seated EOB)  LE Dressing: Independent  LE Dressing Skilled Clinical Factors: to don pants, shoes (increased time/effort to thread L LE d/t hx of TKA)  Toileting Skilled Clinical Factors: pt demo'd toilet transfer with mod I using handrail, has been UAL in room completing ADLs independently  Additional Comments: Pt UAL in room completing ADLs independently    Activity Tolerance  Activity Tolerance: Patient tolerated evaluation without incident    Bed mobility  Supine to Sit: Independent  Scooting: Independent    Vision  Vision: Within Functional Limits  Vision Exceptions: Wears glasses for reading  Hearing  Hearing: Within functional limits    Cognition  Overall Cognitive Status: WFL  Orientation  Overall Orientation Status: Within Functional Limits  Orientation Level: Oriented to person;Oriented to time;Oriented to situation;Oriented to place      Functional Mobility: Pt completed fxl mobility to/from BR and  ~50 ft in room with no AD independently. Pt sounds SOB, but reports this is baseline. Static Sitting Balance: Independent  Dynamic Sitting Balance: Independent  Static Standing Balance: Independent  Dynamic Standing Balance: Independent    Education Given To: Patient  Education Provided: Role of Therapy; ADL Adaptive Strategies; Energy Conservation;Equipment  Education Method: Demonstration;Verbal  Barriers to Learning: None  Education Outcome: Verbalized understanding;Demonstrated understanding                            AM-PAC Score        AM-[facility] Inpatient Daily Activity Raw Score: 24 (02/01/23 1541)  AM-PAC Inpatient ADL T-Scale Score : 57.54 (02/01/23 1541)  ADL Inpatient CMS 0-100% Score: 0 (02/01/23 1541)  ADL Inpatient CMS G-Code Modifier : CH (02/01/23 1541)        Goals  Short Term Goals  Time Frame for Short Term Goals: no acute OT goals identified.        Therapy Time   Individual Concurrent Group Co-treatment   Time In 5610         Time Out 1535         Minutes 31         Timed Code Treatment Minutes: [facility], OTR/L 2016

## 2023-02-01 NOTE — PLAN OF CARE
Problem: Discharge Planning  Goal: Discharge to home or other facility with appropriate resources  2/1/2023 1556 by Gill Pacheco RN  Outcome: Adequate for Discharge  2/1/2023 1055 by Gill Pacheco RN  Outcome: Progressing     Problem: Pain  Goal: Verbalizes/displays adequate comfort level or baseline comfort level  2/1/2023 1556 by Gill Pacheco RN  Outcome: Adequate for Discharge  2/1/2023 1055 by Gill Pacheco RN  Outcome: Progressing     Problem: ABCDS Injury Assessment  Goal: Absence of physical injury  2/1/2023 1556 by Gill Pacheco RN  Outcome: Adequate for Discharge  2/1/2023 1055 by Gill Pacheco RN  Outcome: Progressing     Problem: Infection - Adult  Goal: Absence of infection at discharge  2/1/2023 1556 by Gill Pacheco RN  Outcome: Adequate for Discharge  2/1/2023 1055 by Gill Pacheco RN  Outcome: Progressing  Goal: Absence of infection during hospitalization  2/1/2023 1556 by Gill Pacheco RN  Outcome: Adequate for Discharge  2/1/2023 1055 by Gill Pacheco RN  Outcome: Progressing  Goal: Absence of fever/infection during anticipated neutropenic period  2/1/2023 1556 by Gill Pacheco RN  Outcome: Adequate for Discharge  2/1/2023 1055 by Gill Pacheco RN  Outcome: Progressing

## 2023-02-01 NOTE — PROGRESS NOTES
Patient discharged per MD order. IV removed intact. Meds delivered from [facility]. Patient given discharge instructions and verbalized understanding. Patient and all belongings taken to the lobby via wheelchair.

## 2023-02-01 NOTE — PLAN OF CARE
Problem: Discharge Planning  Goal: Discharge to home or other facility with appropriate resources  2/1/2023 1055 by Clifford Samaniego RN  Outcome: Progressing  2/1/2023 0124 by Tirso Rivers RN  Outcome: Progressing  Flowsheets (Taken 1/31/2023 2300)  Discharge to home or other facility with appropriate resources:   Identify barriers to discharge with patient and caregiver   Arrange for needed discharge resources and transportation as appropriate   Identify discharge learning needs (meds, wound care, etc)     Problem: Pain  Goal: Verbalizes/displays adequate comfort level or baseline comfort level  2/1/2023 1055 by Clifford Samaniego RN  Outcome: Progressing  2/1/2023 0124 by Tirso Rivers RN  Outcome: Progressing     Problem: ABCDS Injury Assessment  Goal: Absence of physical injury  2/1/2023 1055 by Clifford Samaniego RN  Outcome: Progressing  2/1/2023 0124 by Tirso Rivers RN  Outcome: Progressing     Problem: Infection - Adult  Goal: Absence of infection at discharge  2/1/2023 1055 by Clifford Samaniego RN  Outcome: Progressing  2/1/2023 0124 by Tirso Rivers RN  Outcome: Progressing  Flowsheets (Taken 1/31/2023 2300)  Absence of infection at discharge:   Assess and monitor for signs and symptoms of infection   Monitor lab/diagnostic results   Monitor all insertion sites i.e., indwelling lines, tubes and drains  Goal: Absence of infection during hospitalization  2/1/2023 1055 by Clifford Samaniego RN  Outcome: Progressing  2/1/2023 0124 by Tirso Rivers RN  Outcome: Progressing  Flowsheets (Taken 1/31/2023 2300)  Absence of infection during hospitalization:   Assess and monitor for signs and symptoms of infection   Monitor lab/diagnostic results   Monitor all insertion sites i.e., indwelling lines, tubes and drains  Goal: Absence of fever/infection during anticipated neutropenic period  2/1/2023 1055 by Clifford Samaniego RN  Outcome: Progressing  2/1/2023 0124 by Tirso Rivers RN  Outcome: Progressing  Flowsheets (Taken 1/31/2023 2300)  Absence of fever/infection during anticipated neutropenic period: Monitor white blood cell count

## 2023-02-01 NOTE — PROGRESS NOTES
Discharge instructions, medications and side effects reviewed at bedside with patient. Iv dc'd. All questions and concerns answered at this time. Daughter is on way to pick patient up.

## 2023-02-01 NOTE — CARE COORDINATION
DISCHARGE SUMMARY     DATE OF DISCHARGE: 02/01/2023    DISCHARGE DESTINATION: Home      TRANSPORTATION: Dgtr to transport pt home      COMMENTS: Pts dgtr will transport pt home at d/c. No d/c needs noted at this time.    FERCHO King LSMAXIMO  [phone]  Electronically signed by Shannon Clayton on 2/1/2023 at 4:02 PM

## 2023-02-01 NOTE — PROGRESS NOTES
Physician Progress Note      PATIENT:               Cat Holm  CSN #:                  [id number]  :                       1940  ADMIT DATE:       2023 10:25 PM  [address] [place] DATE:  Boris Salazar  PROVIDER #:        Aleida Simon MD          QUERY TEXT:    Patient admitted with Petra . Noted documentation of COVID pneumonia  in pn    on  . In order to support the diagnosis of COVID pneumonia , please   include additional clinical indicators in your documentation. Or please   document if the diagnosis of COVID pneumonia  has been ruled out after further   study. The medical record reflects the following:  Risk Factors: covid, copd  Clinical Indicators: Documentation in pn  of COVID 19 pneumonia without   respiratory compromise. CXR-Stable chronic changes with no acute abnormality   seen. procal- .06, crp-5.4, np O2 support  Treatment: monitor    Thank you, Yolanda Marsh RN CDS CRCR [email or web address]  Options provided:  -- COVID pneumonia  present as evidenced by, Please document evidence. -- COVID pneumonia  was ruled out  -- Other - I will add my own diagnosis  -- Disagree - Not applicable / Not valid  -- Disagree - Clinically unable to determine / Unknown  -- Refer to Clinical Documentation Reviewer    PROVIDER RESPONSE TEXT:    COVID pneumonia was ruled out after study. Query created by:  Swati Marsh on 2023 12:22 PM      Electronically signed by:  Aleida Simon MD 2023 5:52 PM

## 2023-02-02 ENCOUNTER — CARE COORDINATION (OUTPATIENT)
Dept: CASE MANAGEMENT | Age: 83
End: 2023-02-02

## 2023-02-02 ENCOUNTER — NURSE ONLY (OUTPATIENT)
Dept: CARDIOLOGY CLINIC | Age: 83
End: 2023-02-02
Payer: MEDICARE

## 2023-02-02 DIAGNOSIS — Z95.810 PRESENCE OF BIVENTRICULAR AICD: Primary | ICD-10-CM

## 2023-02-02 DIAGNOSIS — I47.20 VENTRICULAR TACHYCARDIA: ICD-10-CM

## 2023-02-02 DIAGNOSIS — I44.7 LBBB (LEFT BUNDLE BRANCH BLOCK): ICD-10-CM

## 2023-02-02 DIAGNOSIS — I50.22 CHRONIC SYSTOLIC HEART FAILURE (HCC): ICD-10-CM

## 2023-02-02 PROCEDURE — 93296 REM INTERROG EVL PM/IDS: CPT | Performed by: INTERNAL MEDICINE

## 2023-02-02 PROCEDURE — 93295 DEV INTERROG REMOTE 1/2/MLT: CPT | Performed by: INTERNAL MEDICINE

## 2023-02-03 ENCOUNTER — CARE COORDINATION (OUTPATIENT)
Dept: CASE MANAGEMENT | Age: 83
End: 2023-02-03

## 2023-02-03 PROBLEM — I47.20 VENTRICULAR TACHYCARDIA (HCC): Status: ACTIVE | Noted: 2023-02-03

## 2023-02-03 NOTE — CARE COORDINATION
[facility] Care Transitions Initial Follow Up Call    Call within 2 business days of discharge: Yes    Patient: Krystal Kerr Patient : 1940   MRN: [id number]  Reason for Admission: Covid, CP, SOB  Discharge Date: 23 RARS: Readmission Risk Score: 12.2      Last Discharge  [address]       Date Complaint Diagnosis Description Type Department Provider    23 Chest Pain Chest pain with high risk of acute coronary syndrome . .. ED to Hosp-Admission (Discharged) (ADMITTED) BLANQUITA MancillaW Mars Jones MD; José Miguel Ji. .. First initial 24 hr follow up outreach call attempt, no answer. CTN left VM with contact information and request for return call. CTN will continue with outreach call attempts.     Care Transitions 24 Hour Call    Care Transitions Interventions         Follow Up  Future Appointments   Date Time Provider Demetrice Hicks   2023  1:20 PM Chayito Montgomery MD [facility]   3/2/2023  8:00 AM Elizabetherica Dunn [facility]   2023 11:00 AM Chayito Montgomery MD [facility] [place] [facility]   2023  9:00 AM SCHEDULE, [facility] WEST REMOTE TRANSMISSION I [facility]   2023  8:00 AM Amanda Pennington MD [facility]   2023  8:00 AM SCHEDULE, [facility] DEVICE CHECK I [facility]   2023 11:00 AM SCHEDULE, [facility] WEST REMOTE TRANSMISSION [facility]   2023  9:00 AM SCHEDULE, I WEST REMOTE TRANSMISSION [facility]       SHIRA Mora, RN   Care Transition Nurse  Mobile: [phone] left upper extremity

## 2023-02-06 NOTE — PROGRESS NOTES
Remote transmission received from patient's CRT-D monitor at home. Transmission shows normal sensing and pacing function. No auto-threshold monitoring available d/t device limitations. Noted NSVT and AT/AF/L (<1% burden, total 17mins) w RVR at times, possibly new to pt. Pt on Coreg, ASA 81mg; no OAC. Ap 53%  RVp 99%  LVp 100%    End of 91-day monitoring period 2/2/23. EP physician will review. See interrogation under cardiology tab in the [address] [place] [address] [address] field for more details. Will continue to monitor remotely.

## 2023-02-07 ENCOUNTER — TELEPHONE (OUTPATIENT)
Dept: CARDIOLOGY CLINIC | Age: 83
End: 2023-02-07

## 2023-02-07 NOTE — TELEPHONE ENCOUNTER
Called patient to discuss findings on Device interrogation and cancel appointment with Dr. Mina Buenrostro and schedule to see Dr. Ed Moore. Patient has a [id number] Score  4 (CHF, HTN, AGE )    Discussed with Dr. Rohit Edmondson instructed to start Eliquis 5 mg BID.

## 2023-02-07 NOTE — TELEPHONE ENCOUNTER
LM for pt to call back, please schedule pt an apptmnt with his cardiologist or a new pt EP apptmnt and device check per [facility].

## 2023-02-07 NOTE — TELEPHONE ENCOUNTER
Spoke with patient advised of new atrial flutter seen on device. Educated on atrial flutter and risk of stroke and need to start on [address]. Denies history of urinary or gastrointestinal bleeding. Denies falling. RX  sent to [facility] for Eliqius 5 mg BID. Scheduled to see Dr. Allyson Spain on 02/20/2023 with device check prior.

## 2023-02-07 NOTE — TELEPHONE ENCOUNTER
----- Message from Garland Jernigan MD sent at 2/6/2023 10:36 PM EST -----  Nor sure who is following patient  The duration of atrial flutter not clear, but appears new. Please schedule for OV with follow up with his physician or EP Doc.

## 2023-02-07 NOTE — TELEPHONE ENCOUNTER
Mary Levy called the office back this morning.    I have him scheduled with Dr. Donald Dowling and device check for Wednesday 3/8/23 at 9:15 am

## 2023-02-17 NOTE — PROGRESS NOTES
[address]      Cardiology Follow Up    Shukri Danger  [birth year]    February 20, 2023    Primary care physician: Ravi Solis MD  Reason for Referral: CHF    CC: \"I got COVID. \"     HPI:  The patient is 81 y.o. male with a past medical history significant for systolic heart failure s/p BiV ICD, hypertension, coronary artery disease, and COPD who presents today for management of heart failure and newly documented atrial flutter. He presented to the hospital 11/17/20 with complaints of progressive shortness of breath and also having a sore throat and headaches. There was no initial obvious etiology of his shortness of breath but because his breathing appeared still labored in the emergency department he was admitted to the hospital. His troponin was normal. His proBNP was only 62. He has a history of a nonischemic cardiomyopathy s/p BiV ICD but his ejection fraction normalized. He previously followed with [facility] cardiology. He returned home from a cruise on 1/28/23 and reported feeling palpitations, chest pains, and worsening shortness of breath. He was admitted with a COPD exacerbation and Covid-19. He was treated with steroids and discharged home. His device interrogation 2/23/23 showed newly documented on atrial flutter with RVR at times (but did not correlate with times of palpitations). He was started on Eliquis 5 mg BID and presents today for a follow up. He states overall he is feeling \"better\" since being discharged home. He denies any recurrent palpitations or chest pains. His dyspnea is slowly improving. He reports compliance with his medications and tolerating. He denies any abnormal bleeding or bruising. Patient denies exertional chest pain/pressure, dyspnea at rest, worsening STANLEY, PND, orthopnea, lightheadedness, weight changes, changes in LE edema, and syncope.     Past Medical History:   Diagnosis Date    AICD (automatic implantable cardioverter defibrillator) at end of life pt instructed to bring ID card--hx of LBBB     Anxiety and depression     Arthritis     Back pain     CHF (congestive heart failure) (HCC)     GERD (gastroesophageal reflux disease)     History of blood transfusion     Hyperlipidemia     Hypertension     Hypothyroidism     hypothyroidism    S/P cardiac cath     Nl LVEF and NL cors/ chronic LBBB now paced    Sleep apnea     pt does not use Cpap machine    Wears glasses      Past Surgical History:   Procedure Laterality Date    COLONOSCOPY  2016    o'celina--elizabeth      COLONOSCOPY  2022    Dr Fernando Alex    COLONOSCOPY N/A 2022    COLONOSCOPY performed by Fernando Alex MD at [facility] ENDOSCOPY--NO [address] Bilateral     Tennis elbow    HAMMER TOE SURGERY      x2    JOINT REPLACEMENT Left 2008    Left Knee    KNEE ARTHROSCOPY      Rt and Left(2)    KNEE CARTILAGE SURGERY  2008    Repair Left    LUMBAR LAMINECTOMY  (L2-3), (L4-5),     screws    TURP      UPPER GASTROINTESTINAL ENDOSCOPY  2022    Dr Chao Alstrom dilitation to [address], gastric and esophagus biopsies    UPPER GASTROINTESTINAL ENDOSCOPY N/A 2022    EGD BIOPSY performed by Fernando Alex MD at [place] [address]  2022    EGD DILATION SAVORY performed by Fernando Alex MD at [address] [place] [address] Road History   Problem Relation Age of Onset    Breast Cancer Mother     Other Father         Peritonitis    Other Daughter         Postpartum Cardiomyopathy     Social History     Tobacco Use    Smoking status: Former     Packs/day: 0.25     Years: 10.00     Pack years: 2.50     Types: Cigarettes     Start date: 1960     Quit date: 1970     Years since quittin.1    Smokeless tobacco: Never   Vaping Use    Vaping Use: Never used   Substance Use Topics    Alcohol use:  Yes     Alcohol/week: 7.0 standard drinks     Types: 7 Cans of beer per week Comment: one can a day    Drug use: No     Allergies   Allergen Reactions    Peanut-Containing Drug Products     Piroxicam Hives    Penicillins Rash and Hives     Current Outpatient Medications   Medication Sig Dispense Refill    carvedilol (COREG) 12.5 MG tablet TAKE 1 TABLET BY MOUTH TWICE DAILY WITH MEALS 180 tablet 3    apixaban (ELIQUIS) 5 MG TABS tablet Take 1 tablet by mouth 2 times daily 60 tablet 11    atorvastatin (LIPITOR) 40 MG tablet TAKE 1 TABLET BY MOUTH AT BEDTIME 90 tablet 0    lisinopril (PRINIVIL;ZESTRIL) 5 MG tablet TAKE 1 TABLET BY MOUTH DAILY 90 tablet 3    theophylline (AUGUSTINE-24) 300 MG extended release capsule TAKE 1 CAPSULE BY MOUTH DAILY 90 capsule 3    citalopram (CELEXA) 40 MG tablet TAKE 1 TABLET BY MOUTH EVERY NIGHT 90 tablet 2    budesonide-formoterol (SYMBICORT) 160-4.5 MCG/ACT AERO Inhale 2 puffs into the lungs 2 times daily 1 each 11    levothyroxine (SYNTHROID) 50 MCG tablet TAKE 1 TABLET BY MOUTH EVERY NIGHT 90 tablet 3    amitriptyline (ELAVIL) 25 MG tablet TAKE 1 TABLET BY MOUTH EVERY NIGHT AT BEDTIME 90 tablet 5    omeprazole (PRILOSEC) 40 MG delayed release capsule Take 1 capsule by mouth every morning (before breakfast) 90 capsule 1    aspirin 81 MG chewable tablet Take 1 tablet by mouth daily 30 tablet 3    albuterol sulfate  (90 Base) MCG/ACT inhaler Inhale 2 puffs into the lungs every 6 hours as needed       No current facility-administered medications for this visit. Review of Systems:  Constitutional: No unanticipated weight loss. There's been no change in energy level, sleep pattern, or activity level. No fevers, chills. Eyes: No visual changes or diplopia. No scleral icterus. ENT: No Headaches, hearing loss or vertigo. No mouth sores or sore throat. Cardiovascular: as reviewed in HPI  Respiratory: No cough or wheezing, no sputum production. No hemoptysis. Gastrointestinal: No abdominal pain, appetite loss, blood in stools.  No change in bowel or bladder habits. Genitourinary: No dysuria, trouble voiding, or hematuria. Musculoskeletal:  No gait disturbance, no joint complaints. Integumentary: No rash or pruritis. Neurological: No headache, diplopia, change in muscle strength, numbness or tingling. Psychiatric: No anxiety or depression. Endocrine: No temperature intolerance. No excessive thirst, fluid intake, or urination. No tremor. Hematologic/Lymphatic: No abnormal bruising or bleeding, blood clots or swollen lymph nodes. Allergic/Immunologic: No nasal congestion or hives. Physical Exam:   /74   Pulse 76   Ht 5' 9\" (1.753 m)   Wt 189 lb (85.7 kg)   SpO2 92%   BMI 27.91 kg/m²   Wt Readings from Last 3 Encounters:   02/20/23 189 lb (85.7 kg)   02/07/23 197 lb 3.2 oz (89.4 kg)   02/01/23 209 lb 10.5 oz (95.1 kg)     Constitutional: He is oriented to person, place, and time. He appears well-developed and well-nourished. In no acute distress. Head: Normocephalic and atraumatic. Pupils equal and round. Neck: Neck supple. No JVP or carotid bruit appreciated. No mass and no thyromegaly present. No lymphadenopathy present. Cardiovascular: Normal rate. Normal heart sounds. Exam reveals no gallop and no friction rub. No murmur heard. Pulmonary/Chest: Effort normal and breath sounds normal. No respiratory distress. He has no wheezes, rhonchi or rales. Abdominal: Soft, non-tender. Bowel sounds are normal. He exhibits no organomegaly, mass or bruit. Extremities: No edema. No cyanosis or clubbing. Pulses are 2+ radial/carotid bilaterally. Neurological: No gross cranial nerve deficit. Coordination normal.   Skin: Skin is warm and dry. There is no rash or diaphoresis. Psychiatric: He has a normal mood and affect.  His speech is normal and behavior is normal.     Lab Review:    Lab Results   Component Value Date/Time    TRIG 129 11/02/2022 07:43 AM    HDL 53 11/02/2022 07:43 AM    HDL 56 06/05/2012 10:13 PM    LDLCALC 95 11/02/2022 07:43 AM LDLDIRECT 109 2021 02:34 PM    LABVLDL 26 2022 07:43 AM     Lab Results   Component Value Date/Time    BUN 27 2023 04:41 AM    CREATININE 1.0 2023 04:41 AM     EKG Interpretation: 2020. Electronic ventricular pacemaker. Image Review:     Cath: 2009  LEFT MAIN: Normal                 LEFT ANTERIOR DESCENDIN% mid           LEFT CIRCUMFLEX: Normal                 RIGHT CORONARY:  Normal           Echo: 20  There is a pacemaker lead in the right ventricle. Overall left ventricular ejection fraction is estimated to be 50-55%. The left ventricular function is low normal.  Paradoxical septal motion secondary to paced rhythm. There is trivial mitral and aortic regurgitation. Stress Test: 2020  The LV EF is 69%  Normal global and regional wall motion in all territories. There is a medium size, fixed defect in the apical wall consistent with soft tissue attenuation. Echo 23   Suboptimal image quality. Normal left ventricle size, wall thickness, and systolic function with an  estimated ejection fraction of 55-60%. No regional wall motion abnormalities  are seen. The right ventricle is not well visualized but appears grossly normal in  size and function. Trivial mitral and tricuspid regurgitation. Pacer / ICD wire is visualized in the right ventricle. Assessment/Plan:   1) Chronic diastolic heart failure s/p BiV ICD. EF 50-55% (in  from 30% in ). Repeat echo 2023 showed EF 55%. Patient appears euvolemic. Device interrogated today and reviewed. Continue carvedilol and lisinopril. Encouraged daily weights and low sodium diet. Instructed to call with any worsening symptoms. 2) Paroxysmal atrial flutter. Seemingly asymptomatic. First documented with diagnosis of Covid-19 infection. CHADS-Vasc is at least 5 (CHF, HTN, age, CAD).  Treatment options for atrial fibrillation/flutter discussed including rate control, anticoagulation options, antiarrhythmics, and cardioversion. Continue Eliquis 5 mg BID. Will discontinue aspirin with being on chronic anticoagulation. Continue rate control strategy currently with beta-blocker. Discussed starting additional medical therapy if has symptomatic episodes or high burden documented on his device interrogations. 3) Essential hypertension. Controlled. Goal BP <130/80. Continue medical therapy. 4) CAD. Asymptomatic. Continue with medical management and risk factor modification including aspirin, statin, and B-blocker. 5) Nonsustained ventricular tachycardia. Patient protected with ICD. Continue beta-blocker. 6) COPD. Follows with Dr. Deepak Pelletier. Continues to reports chronic STANLEY, likely related to recent Covid infection and COPD. Echo showed normal LVEF and encouraged continued follow up with pulmonary. Follow up in 4 months as scheduled     Thank you very much for allowing me to participate in the care of your patient. Please do not hesitate to contact me if you have any questions. Sincerely,  Kandy Dowd. Carissa Preciado, [address] [place] [address]  Ph: [phone]  Fax: [phone]    This note was scribed in the presence of Dr Carissa Preciado MD by Jo Landaverde RN. Physician Attestation: The scribes documentation has been prepared under my direction and personally reviewed by me in its entirety. I confirm that the note above accurately reflects all work, treatment, procedures, and medical decision making performed by me. All portions of the note including but not limited to the chief complaint, history of present illness, physical exam, assessment and plan/medical decision making were personally reviewed, edited, and updated on the day of the visit.

## 2023-02-20 ENCOUNTER — OFFICE VISIT (OUTPATIENT)
Dept: CARDIOLOGY CLINIC | Age: 83
End: 2023-02-20
Payer: MEDICARE

## 2023-02-20 VITALS
OXYGEN SATURATION: 92 % | BODY MASS INDEX: 27.99 KG/M2 | SYSTOLIC BLOOD PRESSURE: 120 MMHG | HEIGHT: 69 IN | DIASTOLIC BLOOD PRESSURE: 74 MMHG | HEART RATE: 76 BPM | WEIGHT: 189 LBS

## 2023-02-20 DIAGNOSIS — I48.92 PAROXYSMAL ATRIAL FLUTTER (HCC): ICD-10-CM

## 2023-02-20 DIAGNOSIS — I47.29 NSVT (NONSUSTAINED VENTRICULAR TACHYCARDIA): ICD-10-CM

## 2023-02-20 DIAGNOSIS — I25.10 CORONARY ARTERY DISEASE INVOLVING NATIVE CORONARY ARTERY OF NATIVE HEART WITHOUT ANGINA PECTORIS: ICD-10-CM

## 2023-02-20 DIAGNOSIS — I10 ESSENTIAL HYPERTENSION: ICD-10-CM

## 2023-02-20 DIAGNOSIS — Z95.810 PRESENCE OF BIVENTRICULAR AICD: ICD-10-CM

## 2023-02-20 DIAGNOSIS — I50.32 CHRONIC DIASTOLIC CONGESTIVE HEART FAILURE (HCC): Primary | ICD-10-CM

## 2023-02-20 PROCEDURE — G8484 FLU IMMUNIZE NO ADMIN: HCPCS | Performed by: INTERNAL MEDICINE

## 2023-02-20 PROCEDURE — 3078F DIAST BP <80 MM HG: CPT | Performed by: INTERNAL MEDICINE

## 2023-02-20 PROCEDURE — G8417 CALC BMI ABV UP PARAM F/U: HCPCS | Performed by: INTERNAL MEDICINE

## 2023-02-20 PROCEDURE — 3074F SYST BP LT 130 MM HG: CPT | Performed by: INTERNAL MEDICINE

## 2023-02-20 PROCEDURE — 99214 OFFICE O/P EST MOD 30 MIN: CPT | Performed by: INTERNAL MEDICINE

## 2023-02-20 PROCEDURE — 1123F ACP DISCUSS/DSCN MKR DOCD: CPT | Performed by: INTERNAL MEDICINE

## 2023-02-20 PROCEDURE — 1111F DSCHRG MED/CURRENT MED MERGE: CPT | Performed by: INTERNAL MEDICINE

## 2023-02-20 PROCEDURE — G8427 DOCREV CUR MEDS BY ELIG CLIN: HCPCS | Performed by: INTERNAL MEDICINE

## 2023-02-20 PROCEDURE — 1036F TOBACCO NON-USER: CPT | Performed by: INTERNAL MEDICINE

## 2023-03-02 ENCOUNTER — OFFICE VISIT (OUTPATIENT)
Dept: PULMONOLOGY | Age: 83
End: 2023-03-02
Payer: MEDICARE

## 2023-03-02 VITALS
BODY MASS INDEX: 28.41 KG/M2 | SYSTOLIC BLOOD PRESSURE: 120 MMHG | OXYGEN SATURATION: 93 % | HEART RATE: 66 BPM | RESPIRATION RATE: 16 BRPM | DIASTOLIC BLOOD PRESSURE: 70 MMHG | HEIGHT: 69 IN | TEMPERATURE: 97.4 F | WEIGHT: 191.8 LBS

## 2023-03-02 DIAGNOSIS — J45.909 SEVERE ASTHMA WITHOUT COMPLICATION, UNSPECIFIED WHETHER PERSISTENT: Primary | ICD-10-CM

## 2023-03-02 DIAGNOSIS — R76.8 ELEVATED IGE LEVEL: ICD-10-CM

## 2023-03-02 PROCEDURE — 1111F DSCHRG MED/CURRENT MED MERGE: CPT | Performed by: INTERNAL MEDICINE

## 2023-03-02 PROCEDURE — 3074F SYST BP LT 130 MM HG: CPT | Performed by: INTERNAL MEDICINE

## 2023-03-02 PROCEDURE — 1036F TOBACCO NON-USER: CPT | Performed by: INTERNAL MEDICINE

## 2023-03-02 PROCEDURE — G8427 DOCREV CUR MEDS BY ELIG CLIN: HCPCS | Performed by: INTERNAL MEDICINE

## 2023-03-02 PROCEDURE — G8484 FLU IMMUNIZE NO ADMIN: HCPCS | Performed by: INTERNAL MEDICINE

## 2023-03-02 PROCEDURE — 99213 OFFICE O/P EST LOW 20 MIN: CPT | Performed by: INTERNAL MEDICINE

## 2023-03-02 PROCEDURE — G8417 CALC BMI ABV UP PARAM F/U: HCPCS | Performed by: INTERNAL MEDICINE

## 2023-03-02 PROCEDURE — 1123F ACP DISCUSS/DSCN MKR DOCD: CPT | Performed by: INTERNAL MEDICINE

## 2023-03-02 PROCEDURE — 3078F DIAST BP <80 MM HG: CPT | Performed by: INTERNAL MEDICINE

## 2023-03-02 ASSESSMENT — ASTHMA QUESTIONNAIRES
QUESTION_1 LAST FOUR WEEKS HOW MUCH OF THE TIME DID YOUR ASTHMA KEEP YOU FROM GETTING AS MUCH DONE AT WORK, SCHOOL OR AT HOME: 3
QUESTION_5 LAST FOUR WEEKS HOW WOULD YOU RATE YOUR ASTHMA CONTROL: 3
QUESTION_4 LAST FOUR WEEKS HOW OFTEN HAVE YOU USED YOUR RESCUE INHALER OR NEBULIZER MEDICATION (SUCH AS ALBUTEROL): 3
ACT_TOTALSCORE: 16
QUESTION_3 LAST FOUR WEEKS HOW OFTEN DID YOUR ASTHMA SYMPTOMS (WHEEZING, COUGHING, SHORTNESS OF BREATH, CHEST TIGHTNESS OR PAIN) WAKE YOU UP AT NIGHT OR EARLIER THAN USUAL IN THE MORNING: 4
QUESTION_2 LAST FOUR WEEKS HOW OFTEN HAVE YOU HAD SHORTNESS OF BREATH: 3

## 2023-03-02 NOTE — PROGRESS NOTES
Chief complaint  This is a 81y.o. year old male  who comes to see me with a chief complaint of   Chief Complaint   Patient presents with    Asthma       HPI  Here with a cc of asthma    Recently had COVID in January but did not feel sick. Was in afib at that time too and was admitted. Seems to be back to baseline breathing. He does get SOB with stairs but not sure if respiratory or heart. He does recover in about 15 seconds. No other changes. On symbicort, theophylline and albuterol. Rarely has to use albuterol.   When he is SOB and tries to go for his albuterol his breathing is already better        Current Outpatient Medications:     carvedilol (COREG) 12.5 MG tablet, TAKE 1 TABLET BY MOUTH TWICE DAILY WITH MEALS, Disp: 180 tablet, Rfl: 3    apixaban (ELIQUIS) 5 MG TABS tablet, Take 1 tablet by mouth 2 times daily, Disp: 60 tablet, Rfl: 11    atorvastatin (LIPITOR) 40 MG tablet, TAKE 1 TABLET BY MOUTH AT BEDTIME, Disp: 90 tablet, Rfl: 0    lisinopril (PRINIVIL;ZESTRIL) 5 MG tablet, TAKE 1 TABLET BY MOUTH DAILY, Disp: 90 tablet, Rfl: 3    theophylline (AUGUSTINE-24) 300 MG extended release capsule, TAKE 1 CAPSULE BY MOUTH DAILY, Disp: 90 capsule, Rfl: 3    citalopram (CELEXA) 40 MG tablet, TAKE 1 TABLET BY MOUTH EVERY NIGHT, Disp: 90 tablet, Rfl: 2    budesonide-formoterol (SYMBICORT) 160-4.5 MCG/ACT AERO, Inhale 2 puffs into the lungs 2 times daily, Disp: 1 each, Rfl: 11    levothyroxine (SYNTHROID) 50 MCG tablet, TAKE 1 TABLET BY MOUTH EVERY NIGHT, Disp: 90 tablet, Rfl: 3    amitriptyline (ELAVIL) 25 MG tablet, TAKE 1 TABLET BY MOUTH EVERY NIGHT AT BEDTIME, Disp: 90 tablet, Rfl: 5    omeprazole (PRILOSEC) 40 MG delayed release capsule, Take 1 capsule by mouth every morning (before breakfast), Disp: 90 capsule, Rfl: 1    albuterol sulfate  (90 Base) MCG/ACT inhaler, Inhale 2 puffs into the lungs every 6 hours as needed, Disp: , Rfl:       PHYSICAL EXAM:  Vitals:    03/02/23 0753   BP: 120/70   Pulse: 66   Resp: 16   Temp: 97.4 °F (36.3 °C)   SpO2: 93%       GENERAL:  Well nourished, alert, appears stated age, no distress  HEENT:  No scleral icterus, no conjunctival irritation. Mallampati IV  NECK:  No thyromegaly, no bruits. Reduced ROM of the neck   LYMPH:  No cervical or supraclavicular adenopathy  HEART:  Regular rate and rhythm, no murmurs  LUNGS:  Clear bilaterally  ABDOMEN:  No distention, no organomegaly  EXTREMITIES:  No edema, no digital clubbing  NEURO:  No localizing deficits, CN II-XII intact    Pulmonary Function Testing Completed at outside hospital  Overall outside pulm notes suggest severe obstruction with bronchodilator response but missing a lot of information     Chest imaging from 1/23 is reviewed. My interpretation is atelectasis likely     1/21  Total IgE Level:  376  Significant allergies:  Multiple       ASTHMA CONTROL TEST 3/2/2023 5/23/2022 6/23/2021 3/9/2021   In the past 4 weeks, how much of the time did your asthma keep you from getting as much done at work, school or at home? 3 4 5 4   During the past 4 weeks, how often have you had shortness of breath? 3 4 3 3   During the past 4 weeks, how often did your asthma symptoms (wheezing, coughing, shortness of breath, chest tightness or pain) wake you up at night or earlier than usual in the morning? 4 3 5 4   During the past 4 weeks, how often have you used your rescue inhaler or nebulizer medication (such as albuterol)? 3 5 4 2   How would you rate your asthma control during the past 4 weeks? 3 4 4 3   Asthma Control Test Total Score 16 20 21 16       I reviewed above labs and studies pertinent to this visit and date    Assessment/Plan:  1. Severe asthma without complication, unspecified whether persistent  Seems to be controlled on regimen. I would recommend using albuterol prior to exercise and exertion. His SOB that recovers in 15 seconds is not lung related. Probably due to over-exertion more than anything    2.  Elevated IgE level  Has triggers for breathing but seems to be well controlled at this time. Singulair did not help.   Generally does not flare up a lot     Follow up in 6 months

## 2023-03-17 NOTE — PROGRESS NOTES
Chief complaint  This is a [de-identified]y.o. year old male  who comes to see me with a chief complaint of   Chief Complaint   Patient presents with    Asthma       HPI  Here with a cc of asthma    He is doing wonderful. On symbicort, theophylline and singulair. He says the theophylline and singulair are expensive and he wonders if he needs to stay on it. He is breathing much better than when we met the first time in the hospital.  He is planning on a  trip in August.  Had COVID vaccine as well        Current Outpatient Medications:     AUGUSTINE-24 300 MG extended release capsule, TAKE 1 CAPSULE BY MOUTH DAILY, Disp: 30 capsule, Rfl: 5    levothyroxine (SYNTHROID) 50 MCG tablet, Take 1 tablet by mouth nightly, Disp: 90 tablet, Rfl: 3    atorvastatin (LIPITOR) 40 MG tablet, TAKE 1 TABLET BY MOUTH AT BEDTIME, Disp: 90 tablet, Rfl: 0    lisinopril (PRINIVIL;ZESTRIL) 5 MG tablet, TAKE 1 TABLET BY MOUTH DAILY, Disp: 90 tablet, Rfl: 3    montelukast (SINGULAIR) 10 MG tablet, Take 1 tablet by mouth daily, Disp: 90 tablet, Rfl: 1    amitriptyline (ELAVIL) 25 MG tablet, TAKE 1 TABLET BY MOUTH EVERY NIGHT AT BEDTIME, Disp: 90 tablet, Rfl: 5    citalopram (CELEXA) 40 MG tablet, Take 1 tablet by mouth nightly, Disp: 90 tablet, Rfl: 2    carvedilol (COREG) 12.5 MG tablet, TAKE 1 TABLET BY MOUTH TWICE DAILY WITH MEALS, Disp: 180 tablet, Rfl: 3    aspirin 81 MG chewable tablet, Take 1 tablet by mouth daily, Disp: 30 tablet, Rfl: 3    budesonide-formoterol (SYMBICORT) 160-4.5 MCG/ACT AERO, Inhale 2 puffs into the lungs 2 times daily, Disp: , Rfl:     albuterol sulfate  (90 Base) MCG/ACT inhaler, Inhale 2 puffs into the lungs every 6 hours as needed, Disp: , Rfl:       PHYSICAL EXAM:  Vitals:    06/23/21 0747   BP: 124/78   Pulse: 60   Resp: 16   Temp: 96.9 °F (36.1 °C)   SpO2: 95%       GENERAL:  Well nourished, alert, appears stated age, no distress  HEENT:  No scleral icterus, no conjunctival irritation. Mallampati IV  NECK:  No thyromegaly, no bruits  LYMPH:  No cervical or supraclavicular adenopathy  HEART:  Regular rate and rhythm, no murmurs  LUNGS:  Clear bilaterally    ABDOMEN:  No distention, no organomegaly  EXTREMITIES:  No edema, no digital clubbing  NEURO:  No localizing deficits, CN II-XII intact    Pulmonary Function Testing Completed at outside hospital  Overall outside pulm notes suggest severe obstruction with bronchodilator response but missing a lot of information     Chest imaging from 11/2020 is reviewed. My interpretation is mild ground glass      1/21  Total IgE Level:  376  Significant allergies:  Multiple     Lab Results   Component Value Date    WBC 9.6 11/20/2020    HGB 14.4 11/20/2020    HCT 43.9 11/20/2020    MCV 93.3 11/20/2020     11/20/2020       No results found for: BNP    Lab Results   Component Value Date    CREATININE 1.3 02/23/2021    BUN 15 02/23/2021     02/23/2021    K 3.9 02/23/2021     02/23/2021    CO2 28 02/23/2021     ASTHMA CONTROL TEST 6/23/2021 3/9/2021   In the past 4 weeks, how much of the time did your asthma keep you from getting as much done at work, school or at home? 5 4   During the past 4 weeks, how often have you had shortness of breath? 3 3   During the past 4 weeks, how often did your asthma symptoms (wheezing, coughing, shortness of breath, chest tightness or pain) wake you up at night or earlier than usual in the morning? 5 4   During the past 4 weeks, how often have you used your rescue inhaler or nebulizer medication (such as albuterol)? 4 2   How would you rate your asthma control during the past 4 weeks? 4 3   Asthma Control Test Total Score 21 16       I reviewed above labs and studies pertinent to this visit and date    Assessment/Plan:  1. Severe asthma without complication, unspecified whether persistent  Much improvement. Continue with symbicort bid and prn albuterol.   Ok to finish current theophylline script but I think I will have to change to bid dosing to find the cheaper option. He will let me know when he is ready for the new script. Likely 300 mg bid    2. Nocturnal hypoxia  Uses 2 liters of oxygen with sleep. Does benefit    3. Elevated IgE level  On singulair but not sure it is helping. He will stop it when it runs out and assess.   If he gets worse he will resume it      Follow up in 4 months Awake/Alert/Cooperative

## 2023-03-22 PROBLEM — I48.92 ATRIAL FLUTTER (HCC): Status: ACTIVE | Noted: 2023-03-22

## 2023-05-18 ENCOUNTER — NURSE ONLY (OUTPATIENT)
Dept: CARDIOLOGY CLINIC | Age: 83
End: 2023-05-18

## 2023-05-18 DIAGNOSIS — I47.20 VENTRICULAR TACHYCARDIA (HCC): ICD-10-CM

## 2023-05-18 DIAGNOSIS — I50.22 CHRONIC SYSTOLIC HF (HEART FAILURE) (HCC): ICD-10-CM

## 2023-05-18 DIAGNOSIS — I50.22 CHRONIC SYSTOLIC HEART FAILURE (HCC): Primary | ICD-10-CM

## 2023-05-19 NOTE — PROGRESS NOTES
Remote transmission received from patient's CRT-D monitor at home. Transmission shows normal sensing and pacing function. No auto-threshold monitoring available d/t device limitations. NSVT and AT noted (coreg). Ap 49%  RVp 99%  LVp 100%    EP physician will review. See interrogation under cardiology tab in the [address] [place] [address] [address] field for more details. Will continue to monitor remotely.      (End of 91-day monitoring period 5/18/23)

## 2023-05-30 DIAGNOSIS — J45.909 SEVERE ASTHMA WITHOUT COMPLICATION, UNSPECIFIED WHETHER PERSISTENT: ICD-10-CM

## 2023-05-31 RX ORDER — BUDESONIDE AND FORMOTEROL FUMARATE DIHYDRATE 160; 4.5 UG/1; UG/1
AEROSOL RESPIRATORY (INHALATION)
Qty: 10.2 G | Refills: 11 | Status: SHIPPED | OUTPATIENT
Start: 2023-05-31

## 2023-06-01 ENCOUNTER — TELEPHONE (OUTPATIENT)
Dept: PULMONOLOGY | Age: 83
End: 2023-06-01

## 2023-06-01 DIAGNOSIS — J45.909 SEVERE ASTHMA WITHOUT COMPLICATION, UNSPECIFIED WHETHER PERSISTENT: Primary | ICD-10-CM

## 2023-06-01 RX ORDER — BUDESONIDE AND FORMOTEROL FUMARATE DIHYDRATE 160; 4.5 UG/1; UG/1
2 AEROSOL RESPIRATORY (INHALATION) 2 TIMES DAILY
Qty: 1 EACH | Refills: 11 | Status: SHIPPED | OUTPATIENT
Start: 2023-06-01

## 2023-06-01 NOTE — TELEPHONE ENCOUNTER
Fax from [facility] stating that Budesonide is not covered on insurance plan.  Patient can have the followin Misbah Miller,[address]    Call routed to [address] [place] [address]

## 2023-06-06 ENCOUNTER — NURSE ONLY (OUTPATIENT)
Dept: CARDIOLOGY CLINIC | Age: 83
End: 2023-06-06
Payer: MEDICARE

## 2023-06-06 ENCOUNTER — OFFICE VISIT (OUTPATIENT)
Dept: CARDIOLOGY CLINIC | Age: 83
End: 2023-06-06
Payer: MEDICARE

## 2023-06-06 VITALS
WEIGHT: 191.6 LBS | BODY MASS INDEX: 28.38 KG/M2 | SYSTOLIC BLOOD PRESSURE: 120 MMHG | HEART RATE: 61 BPM | HEIGHT: 69 IN | OXYGEN SATURATION: 92 % | DIASTOLIC BLOOD PRESSURE: 60 MMHG

## 2023-06-06 DIAGNOSIS — I50.22 CHRONIC SYSTOLIC HEART FAILURE (HCC): ICD-10-CM

## 2023-06-06 DIAGNOSIS — I47.20 VENTRICULAR TACHYCARDIA (HCC): ICD-10-CM

## 2023-06-06 DIAGNOSIS — I48.92 PAROXYSMAL ATRIAL FLUTTER (HCC): ICD-10-CM

## 2023-06-06 DIAGNOSIS — I47.29 NSVT (NONSUSTAINED VENTRICULAR TACHYCARDIA) (HCC): ICD-10-CM

## 2023-06-06 DIAGNOSIS — Z95.810 PRESENCE OF BIVENTRICULAR AICD: Primary | ICD-10-CM

## 2023-06-06 DIAGNOSIS — I48.92 ATRIAL FLUTTER, UNSPECIFIED TYPE (HCC): ICD-10-CM

## 2023-06-06 DIAGNOSIS — I44.7 LBBB (LEFT BUNDLE BRANCH BLOCK): ICD-10-CM

## 2023-06-06 DIAGNOSIS — I10 ESSENTIAL HYPERTENSION: ICD-10-CM

## 2023-06-06 DIAGNOSIS — I50.22 CHRONIC SYSTOLIC HEART FAILURE (HCC): Primary | ICD-10-CM

## 2023-06-06 DIAGNOSIS — I25.10 CORONARY ARTERY DISEASE INVOLVING NATIVE CORONARY ARTERY OF NATIVE HEART WITHOUT ANGINA PECTORIS: ICD-10-CM

## 2023-06-06 PROCEDURE — 1036F TOBACCO NON-USER: CPT | Performed by: INTERNAL MEDICINE

## 2023-06-06 PROCEDURE — 3078F DIAST BP <80 MM HG: CPT | Performed by: INTERNAL MEDICINE

## 2023-06-06 PROCEDURE — 93284 PRGRMG EVAL IMPLANTABLE DFB: CPT | Performed by: INTERNAL MEDICINE

## 2023-06-06 PROCEDURE — 3074F SYST BP LT 130 MM HG: CPT | Performed by: INTERNAL MEDICINE

## 2023-06-06 PROCEDURE — 99214 OFFICE O/P EST MOD 30 MIN: CPT | Performed by: INTERNAL MEDICINE

## 2023-06-06 PROCEDURE — 1123F ACP DISCUSS/DSCN MKR DOCD: CPT | Performed by: INTERNAL MEDICINE

## 2023-06-06 PROCEDURE — G8427 DOCREV CUR MEDS BY ELIG CLIN: HCPCS | Performed by: INTERNAL MEDICINE

## 2023-06-06 PROCEDURE — G8417 CALC BMI ABV UP PARAM F/U: HCPCS | Performed by: INTERNAL MEDICINE

## 2023-06-06 NOTE — PROGRESS NOTES
discussed including rate control, anticoagulation options, antiarrhythmics, and cardioversion. Continue Eliquis 5 mg BID. Discontinued aspirin with being on chronic anticoagulation. Continue rate control strategy currently with beta-blocker. Discussed starting additional medical therapy if has symptomatic episodes or high burden documented on his device interrogations. 3) Essential hypertension. Controlled. Goal BP <130/80. Continue medical therapy. 4) CAD. Asymptomatic. Continue with medical management and risk factor modification including aspirin, statin, and B-blocker. 5) Nonsustained ventricular tachycardia. Patient protected with ICD. Continue beta-blocker. 6) COPD. Follows with Dr. Janeth Broussard. Continues to reports chronic STANLEY, likely related to recent Covid infection and COPD. Echo showed normal LVEF and encouraged continued follow up with pulmonary. Follow up in 6 months     Thank you very much for allowing me to participate in the care of your patient. Please do not hesitate to contact me if you have any questions. Sincerely,  Zully Marks. Yovani Escobar, [address] [place] [address]  Ph: [phone]  Fax: [phone]    This note was scribed in the presence of Dr Yovani Escobar MD by Gaviota Aleman RN. Physician Attestation: The scribes documentation has been prepared under my direction and personally reviewed by me in its entirety. I confirm that the note above accurately reflects all work, treatment, procedures, and medical decision making performed by me. All portions of the note including but not limited to the chief complaint, history of present illness, physical exam, assessment and plan/medical decision making were personally reviewed, edited, and updated on the day of the visit.

## 2023-06-06 NOTE — PROGRESS NOTES
Patient comes in for programming evaluation on their [address] CRT-D    All sensing and pacing parameters are within normal range. Battery life 7 y  P- AsBiVp @ 60bpm  U- AsVs @ 61 bpm  AP 52%.  99%. %  AT/AF burden <1%  PVC total 661  1 VT episode on 5/24/23 at 182 bpm, no therapy  Patient remains on Carvedilol, Eliquis  No changes made this Session. Please see interrogation for more detail. Patient will see Cooper Sethi today and follow up in 3 months in office or remotely. Home Monitor paired, last transmission on 5/18/23.

## 2023-07-17 ENCOUNTER — TELEPHONE (OUTPATIENT)
Dept: PULMONOLOGY | Age: 83
End: 2023-07-17

## 2023-07-17 NOTE — TELEPHONE ENCOUNTER
Radha Diaz calls in stating that Janel Eagle is on backorder at every [facility] in the [place] area. He states he's not having issues breathing and asks if he should wait for them to have it, or should Dr Lacy Obrien prescribe something else? Please review. Thank you!

## 2023-07-25 ENCOUNTER — TELEPHONE (OUTPATIENT)
Dept: PULMONOLOGY | Age: 83
End: 2023-07-25

## 2023-07-25 DIAGNOSIS — J45.50 SEVERE PERSISTENT ASTHMA WITHOUT COMPLICATION: Primary | ICD-10-CM

## 2023-07-25 NOTE — TELEPHONE ENCOUNTER
Last office visit 3/2023 next appt 8/9/23  Call to [facility] regarding what is in stock to switch patient's Sergio-24. [facility] stated they have 40 tablets/capsules of the Theophylline  mgs. Or we can try and get it from [facility] on [address] who stated they don't have it either. Also suggested [facility]. If the  is an option please advise on refill or will have to have patient call around to different stores.

## 2023-07-25 NOTE — TELEPHONE ENCOUNTER
Phone call to patient regarding the Theophylline. He was advised to check around to different pharmacies to see who has it in stock. He stated he will look in to that otherwise he has an appointment in early August and will see if he can get 30 day supply from [id number].

## 2023-07-25 NOTE — TELEPHONE ENCOUNTER
I think he has to call around to different pharmacy or look into mail away.   40 pills is not enough to take for a month

## 2023-07-26 NOTE — TELEPHONE ENCOUNTER
Too Bustamante calls back, still no Sergio 24 in stock anywhere. I called [facility], pharmacist is saying they have generic Sergio 24 Tablet extended release. Would the generic work?  If so please place new order for Theophylline 300 mg tablets # 90

## 2023-07-28 NOTE — TELEPHONE ENCOUNTER
Gilson Carrizales asks that we call the [facility] [address] again. He says he's just the go between destiny and he doesn't know what's going on, but he's been without his medication for weeks now. [facility] on [address] says they have 48 pills of Sergio 24 300mg extended release tablets. They said they don't know if there's a generic, but that the prescription that they received was not for a generic. If Dr Myrl Skiff wants a generic, he would have to send a presciption for a generic. He'd like to be able to pick this medication up and wants us to call the pharmacy and write the prescription for the number of Sergio 24 tablets they have in the store, and figure out if there is a generic and if they have it and write a prescription for that as well.

## 2023-07-28 NOTE — TELEPHONE ENCOUNTER
I sent it in again. Epic does not always clearly delineate if it is generic form.   If what I ordered is wrong just call pharmacy and get verbal ok to fill it

## 2023-08-09 ENCOUNTER — OFFICE VISIT (OUTPATIENT)
Dept: PULMONOLOGY | Age: 83
End: 2023-08-09
Payer: MEDICARE

## 2023-08-09 VITALS
BODY MASS INDEX: 27.37 KG/M2 | TEMPERATURE: 98.2 F | OXYGEN SATURATION: 96 % | WEIGHT: 191.2 LBS | HEIGHT: 70 IN | RESPIRATION RATE: 18 BRPM | SYSTOLIC BLOOD PRESSURE: 122 MMHG | HEART RATE: 61 BPM | DIASTOLIC BLOOD PRESSURE: 64 MMHG

## 2023-08-09 DIAGNOSIS — J45.50 SEVERE PERSISTENT ASTHMA WITHOUT COMPLICATION: Primary | ICD-10-CM

## 2023-08-09 DIAGNOSIS — R76.8 ELEVATED IGE LEVEL: ICD-10-CM

## 2023-08-09 PROCEDURE — 1036F TOBACCO NON-USER: CPT | Performed by: INTERNAL MEDICINE

## 2023-08-09 PROCEDURE — 3074F SYST BP LT 130 MM HG: CPT | Performed by: INTERNAL MEDICINE

## 2023-08-09 PROCEDURE — 99213 OFFICE O/P EST LOW 20 MIN: CPT | Performed by: INTERNAL MEDICINE

## 2023-08-09 PROCEDURE — 3078F DIAST BP <80 MM HG: CPT | Performed by: INTERNAL MEDICINE

## 2023-08-09 PROCEDURE — 1123F ACP DISCUSS/DSCN MKR DOCD: CPT | Performed by: INTERNAL MEDICINE

## 2023-08-09 PROCEDURE — G8417 CALC BMI ABV UP PARAM F/U: HCPCS | Performed by: INTERNAL MEDICINE

## 2023-08-09 PROCEDURE — G8427 DOCREV CUR MEDS BY ELIG CLIN: HCPCS | Performed by: INTERNAL MEDICINE

## 2023-08-09 ASSESSMENT — ASTHMA QUESTIONNAIRES
QUESTION_3 LAST FOUR WEEKS HOW OFTEN DID YOUR ASTHMA SYMPTOMS (WHEEZING, COUGHING, SHORTNESS OF BREATH, CHEST TIGHTNESS OR PAIN) WAKE YOU UP AT NIGHT OR EARLIER THAN USUAL IN THE MORNING: 5
QUESTION_5 LAST FOUR WEEKS HOW WOULD YOU RATE YOUR ASTHMA CONTROL: 4
ACT_TOTALSCORE: 19
QUESTION_1 LAST FOUR WEEKS HOW MUCH OF THE TIME DID YOUR ASTHMA KEEP YOU FROM GETTING AS MUCH DONE AT WORK, SCHOOL OR AT HOME: 4
QUESTION_4 LAST FOUR WEEKS HOW OFTEN HAVE YOU USED YOUR RESCUE INHALER OR NEBULIZER MEDICATION (SUCH AS ALBUTEROL): 2
QUESTION_2 LAST FOUR WEEKS HOW OFTEN HAVE YOU HAD SHORTNESS OF BREATH: 4

## 2023-11-07 ENCOUNTER — APPOINTMENT (OUTPATIENT)
Dept: GENERAL RADIOLOGY | Age: 83
End: 2023-11-07
Payer: MEDICARE

## 2023-11-07 ENCOUNTER — APPOINTMENT (OUTPATIENT)
Dept: CT IMAGING | Age: 83
End: 2023-11-07
Payer: MEDICARE

## 2023-11-07 ENCOUNTER — HOSPITAL ENCOUNTER (EMERGENCY)
Age: 83
Discharge: HOME | End: 2023-11-07
Payer: MEDICARE

## 2023-11-07 VITALS
HEART RATE: 68 BPM | OXYGEN SATURATION: 98 % | WEIGHT: 193.78 LBS | DIASTOLIC BLOOD PRESSURE: 78 MMHG | SYSTOLIC BLOOD PRESSURE: 126 MMHG | TEMPERATURE: 98 F | BODY MASS INDEX: 27.74 KG/M2 | HEIGHT: 70 IN | RESPIRATION RATE: 18 BRPM

## 2023-11-07 DIAGNOSIS — T07.XXXA ABRASIONS OF MULTIPLE SITES: ICD-10-CM

## 2023-11-07 DIAGNOSIS — S09.90XA CLOSED HEAD INJURY, INITIAL ENCOUNTER: ICD-10-CM

## 2023-11-07 DIAGNOSIS — S29.9XXA RIB INJURY: Primary | ICD-10-CM

## 2023-11-07 PROCEDURE — 70450 CT HEAD/BRAIN W/O DYE: CPT

## 2023-11-07 PROCEDURE — 6370000000 HC RX 637 (ALT 250 FOR IP): Performed by: NURSE PRACTITIONER

## 2023-11-07 PROCEDURE — 99284 EMERGENCY DEPT VISIT MOD MDM: CPT

## 2023-11-07 PROCEDURE — 71101 X-RAY EXAM UNILAT RIBS/CHEST: CPT

## 2023-11-07 RX ORDER — ACETAMINOPHEN 500 MG
500 TABLET ORAL 4 TIMES DAILY PRN
Qty: 28 TABLET | Refills: 0 | Status: SHIPPED | OUTPATIENT
Start: 2023-11-07 | End: 2023-11-14

## 2023-11-07 RX ORDER — LIDOCAINE 4 G/G
1 PATCH TOPICAL DAILY
Status: DISCONTINUED | OUTPATIENT
Start: 2023-11-07 | End: 2023-11-07 | Stop reason: HOSPADM

## 2023-11-07 RX ORDER — LIDOCAINE 50 MG/G
1 PATCH TOPICAL DAILY
Qty: 10 PATCH | Refills: 0 | Status: SHIPPED | OUTPATIENT
Start: 2023-11-07 | End: 2023-11-17

## 2023-11-07 RX ORDER — ACETAMINOPHEN 325 MG/1
650 TABLET ORAL ONCE
Status: COMPLETED | OUTPATIENT
Start: 2023-11-07 | End: 2023-11-07

## 2023-11-07 RX ADMIN — ACETAMINOPHEN 650 MG: 325 TABLET ORAL at 09:56

## 2023-11-07 ASSESSMENT — ENCOUNTER SYMPTOMS
VOMITING: 0
EYE PAIN: 0
SORE THROAT: 0
COUGH: 0
SHORTNESS OF BREATH: 0
BACK PAIN: 0
ABDOMINAL PAIN: 0
ANAL BLEEDING: 0
DIARRHEA: 0
NAUSEA: 0

## 2023-11-07 ASSESSMENT — PAIN DESCRIPTION - LOCATION
LOCATION: RIB CAGE

## 2023-11-07 ASSESSMENT — PAIN SCALES - GENERAL
PAINLEVEL_OUTOF10: 6
PAINLEVEL_OUTOF10: 3
PAINLEVEL_OUTOF10: 3

## 2023-11-07 ASSESSMENT — PAIN - FUNCTIONAL ASSESSMENT
PAIN_FUNCTIONAL_ASSESSMENT: 0-10
PAIN_FUNCTIONAL_ASSESSMENT: 0-10
PAIN_FUNCTIONAL_ASSESSMENT: PREVENTS OR INTERFERES SOME ACTIVE ACTIVITIES AND ADLS

## 2023-11-07 ASSESSMENT — PATIENT HEALTH QUESTIONNAIRE - PHQ9
SUM OF ALL RESPONSES TO PHQ QUESTIONS 1-9: 0
SUM OF ALL RESPONSES TO PHQ9 QUESTIONS 1 & 2: 0
SUM OF ALL RESPONSES TO PHQ QUESTIONS 1-9: 0
SUM OF ALL RESPONSES TO PHQ QUESTIONS 1-9: 0
2. FEELING DOWN, DEPRESSED OR HOPELESS: 0
1. LITTLE INTEREST OR PLEASURE IN DOING THINGS: 0
SUM OF ALL RESPONSES TO PHQ QUESTIONS 1-9: 0

## 2023-11-07 ASSESSMENT — PAIN DESCRIPTION - DESCRIPTORS
DESCRIPTORS: ACHING

## 2023-11-07 ASSESSMENT — PAIN DESCRIPTION - PAIN TYPE: TYPE: ACUTE PAIN

## 2023-11-07 ASSESSMENT — PAIN DESCRIPTION - ORIENTATION
ORIENTATION: LEFT
ORIENTATION: LEFT
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
## 2023-11-07 NOTE — DISCHARGE INSTRUCTIONS
Return to the emergency department for new or worsening symptoms including, not limited to, developing severe shortness of breath, severe headache, nausea, vomiting, passing out, feeling that you are going to pass out, blue lips, change in personality, inability to walk, other symptoms/concerns    Follow-up with your primary care provider for reevaluation next 1-2 days. Utilize RICE. May alternate between ice and heat to the left ribs. Medication as prescribed. Utilize the incentive spirometer to help ensure that your lungs are being adequately inflated to prevent complications such as pneumonia or collapsed lung.
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
## 2023-11-07 NOTE — ED NOTES
Pt given incentive spirometer as ordered. Pt verbalized past use with the device, understands its function and use. Pt demonstrated proper use of the device.      Baron Daniels RN  11/07/23 2411

## 2023-11-07 NOTE — ED NOTES
EDNP gave education on incentive spirometer w/ successful teach back     Shadia Russo  11/07/23 8336

## 2023-11-09 ENCOUNTER — CARE COORDINATION (OUTPATIENT)
Dept: CARE COORDINATION | Age: 83
End: 2023-11-09

## 2023-11-09 ASSESSMENT — ENCOUNTER SYMPTOMS: DYSPNEA ASSOCIATED WITH: EXERTION
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
## 2023-11-10 PROBLEM — S20.219A CHEST WALL CONTUSION: Status: ACTIVE | Noted: 2023-11-10

## 2023-11-11 ENCOUNTER — HOSPITAL ENCOUNTER (OUTPATIENT)
Dept: CT IMAGING | Age: 83
Discharge: HOME | End: 2023-11-11
Attending: INTERNAL MEDICINE
Payer: MEDICARE

## 2023-11-11 DIAGNOSIS — S20.219A CONTUSION OF CHEST WALL, UNSPECIFIED LATERALITY, INITIAL ENCOUNTER: ICD-10-CM

## 2023-11-11 PROCEDURE — 74176 CT ABD & PELVIS W/O CONTRAST: CPT

## 2023-11-16 ENCOUNTER — CARE COORDINATION (OUTPATIENT)
Dept: CASE MANAGEMENT | Age: 83
End: 2023-11-16

## 2023-11-16 NOTE — CARE COORDINATION
Ambulatory Care Coordination Note  2023    Patient Current Location:  Home: [address] [address]     LPN [id number] contacted the patient by telephone. Verified name and  with patient as identifiers. Provided introduction to self, and explanation of the LPN CC role. Challenges to be reviewed by the provider   Additional needs identified to be addressed with provider: No  none               Method of communication with provider: none. ACM: Faye Horne LPN    Spoke with Lexus Emanuel who reported that he had another CAT scan and did end up having some cracked ribs. Patient stated that he is just taking it easy and trying to heal. Patient stated that he is getting better each day. He stated that his PCP gave him some Tramadol for the pain. Patient denied cp, sob, cough, dizziness, headache, n/v, diarrhea, abdominal pains, fever, or chills. Patient report that appetite and fluid intake is good and denied any problems with bowel or bladder. Patient reported that he is taking all medications as ordered. Patient denied any other needs at this time. Patient instructed to continue to monitor s/s, reporting any that may present to MD immediately for early intervention. Patient is agreeable to f/u calls    Offered patient enrollment in the Remote Patient Monitoring (RPM) program for in-home monitoring: Patient declined. Lab Results       None            Care Coordination Interventions    Referral from Primary Care Provider: No  Suggested Interventions and Community Resources  Zone Management Tools:  In Process  Other Services or Interventions: zones CHF, COPD, HTN          Goals Addressed    None         Future Appointments   Date Time Provider [address]   2023  8:15 AM Gary De La O MD [facility]   2024  8:40 AM Gregg Espinal DO [facility]   2024  8:20 AM Gal Bey MD [facility] MIRANDA

## 2023-12-04 ENCOUNTER — TELEPHONE (OUTPATIENT)
Dept: PULMONOLOGY | Age: 83
End: 2023-12-04

## 2023-12-04 NOTE — TELEPHONE ENCOUNTER
Received request from [facility] for medication change from [facility] 160/4.[facility] by his plan. Preferred alt Symbicort, Advair diskus ER, Breo ellipta.

## 2023-12-05 DIAGNOSIS — J45.909 SEVERE ASTHMA WITHOUT COMPLICATION, UNSPECIFIED WHETHER PERSISTENT: ICD-10-CM

## 2023-12-05 RX ORDER — BUDESONIDE AND FORMOTEROL FUMARATE DIHYDRATE 160; 4.5 UG/1; UG/1
2 AEROSOL RESPIRATORY (INHALATION) 2 TIMES DAILY
Qty: 10.2 G | Refills: 11 | Status: SHIPPED | OUTPATIENT
Start: 2023-12-05

## 2023-12-07 ENCOUNTER — CARE COORDINATION (OUTPATIENT)
Dept: CARE COORDINATION | Age: 83
End: 2023-12-07

## 2023-12-07 NOTE — CARE COORDINATION
ACM outreach completed r/t CC Outreach. 1st outreach attempt. HIPAA compliant message left. ACM will continue to follow and will attempt to reach patient at a later time.

## 2023-12-07 NOTE — PROGRESS NOTES
[facility]      Cardiology Follow Up    Kris Burkitt  [birth year] December 11, 2023    Primary care physician: Blessing Irving MD  Reason for Referral: CHF    CC: \"more short of breath\"     HPI:  The patient is 81 y.o. male with a past medical history significant for systolic heart failure s/p BiV ICD, hypertension, coronary artery disease, and COPD who presents today for management of heart failure and newly documented atrial flutter. He presented to the hospital 11/17/20 with complaints of progressive shortness of breath and also having a sore throat and headaches. There was no initial obvious etiology of his shortness of breath but because his breathing appeared still labored in the emergency department he was admitted to the hospital. His troponin was normal. His proBNP was only 62. He has a history of a nonischemic cardiomyopathy s/p BiV ICD but his ejection fraction normalized. He previously followed with [facility] cardiology. He returned home from a cruise on 1/28/23 and reported feeling palpitations, chest pains, and worsening shortness of breath. He was admitted with a COPD exacerbation and Covid-19. He was treated with steroids and discharged home. His device interrogation 2/23/23 showed newly documented on atrial flutter with RVR at times (but did not correlate with times of palpitations). He was started on Eliquis 5 mg BID. Today he presents for follow up and states that overall he is feeling well but reports his chronic dyspnea seems to have worsened over the past 6 months. He denies associated chest pains or acute dyspnea. He reports medication compliance and is tolerating. He denies any abnormal bleeding or bruising. He denies exertional chest pain/pressure, dyspnea at rest, PND, orthopnea, palpitations, lightheadedness, weight changes, changes in LE edema, and syncope.     Past Medical History:   Diagnosis Date    AICD (automatic cardioverter/defibrillator) present     Anxiety

## 2023-12-11 ENCOUNTER — TELEPHONE (OUTPATIENT)
Dept: PULMONOLOGY | Age: 83
End: 2023-12-11

## 2023-12-11 ENCOUNTER — OFFICE VISIT (OUTPATIENT)
Dept: CARDIOLOGY CLINIC | Age: 83
End: 2023-12-11
Payer: MEDICARE

## 2023-12-11 VITALS
OXYGEN SATURATION: 92 % | DIASTOLIC BLOOD PRESSURE: 60 MMHG | HEART RATE: 62 BPM | WEIGHT: 199 LBS | SYSTOLIC BLOOD PRESSURE: 130 MMHG | HEIGHT: 70 IN | BODY MASS INDEX: 28.49 KG/M2

## 2023-12-11 DIAGNOSIS — R06.09 DOE (DYSPNEA ON EXERTION): ICD-10-CM

## 2023-12-11 DIAGNOSIS — I47.29 NSVT (NONSUSTAINED VENTRICULAR TACHYCARDIA) (HCC): ICD-10-CM

## 2023-12-11 DIAGNOSIS — I25.10 CORONARY ARTERY DISEASE INVOLVING NATIVE CORONARY ARTERY OF NATIVE HEART WITHOUT ANGINA PECTORIS: ICD-10-CM

## 2023-12-11 DIAGNOSIS — I50.32 CHRONIC DIASTOLIC CONGESTIVE HEART FAILURE (HCC): Primary | ICD-10-CM

## 2023-12-11 DIAGNOSIS — I48.92 PAROXYSMAL ATRIAL FLUTTER (HCC): ICD-10-CM

## 2023-12-11 DIAGNOSIS — I10 ESSENTIAL HYPERTENSION: ICD-10-CM

## 2023-12-11 LAB
DEPRECATED RDW RBC AUTO: 13.4 % (ref 12.4–15.4)
HCT VFR BLD AUTO: 43.6 % (ref 40.5–52.5)
HGB BLD-MCNC: 14.8 G/DL (ref 13.5–17.5)
MCH RBC QN AUTO: 30.9 PG (ref 26–34)
MCHC RBC AUTO-ENTMCNC: 33.9 G/DL (ref 31–36)
MCV RBC AUTO: 91.1 FL (ref 80–100)
NT-PROBNP SERPL-MCNC: 133 PG/ML (ref 0–449)
PLATELET # BLD AUTO: 178 K/UL (ref 135–450)
PMV BLD AUTO: 8.1 FL (ref 5–10.5)
RBC # BLD AUTO: 4.78 M/UL (ref 4.2–5.9)
WBC # BLD AUTO: 6.2 K/UL (ref 4–11)

## 2023-12-11 PROCEDURE — G8417 CALC BMI ABV UP PARAM F/U: HCPCS | Performed by: INTERNAL MEDICINE

## 2023-12-11 PROCEDURE — 3075F SYST BP GE 130 - 139MM HG: CPT | Performed by: INTERNAL MEDICINE

## 2023-12-11 PROCEDURE — 1123F ACP DISCUSS/DSCN MKR DOCD: CPT | Performed by: INTERNAL MEDICINE

## 2023-12-11 PROCEDURE — G8427 DOCREV CUR MEDS BY ELIG CLIN: HCPCS | Performed by: INTERNAL MEDICINE

## 2023-12-11 PROCEDURE — 99214 OFFICE O/P EST MOD 30 MIN: CPT | Performed by: INTERNAL MEDICINE

## 2023-12-11 PROCEDURE — G8484 FLU IMMUNIZE NO ADMIN: HCPCS | Performed by: INTERNAL MEDICINE

## 2023-12-11 PROCEDURE — 1036F TOBACCO NON-USER: CPT | Performed by: INTERNAL MEDICINE

## 2023-12-11 PROCEDURE — 93000 ELECTROCARDIOGRAM COMPLETE: CPT | Performed by: INTERNAL MEDICINE

## 2023-12-11 PROCEDURE — 3078F DIAST BP <80 MM HG: CPT | Performed by: INTERNAL MEDICINE

## 2023-12-11 NOTE — TELEPHONE ENCOUNTER
Pt came into the office regarding a letter he received from his insurance stating they will no longer be covering the Symbicort inhaler starting the first of the new year. The patient wants to know if there is an alternative that Dr. Dimas Florez can send to the pharmacy that may be covered.

## 2023-12-13 ENCOUNTER — CARE COORDINATION (OUTPATIENT)
Dept: CARE COORDINATION | Age: 83
End: 2023-12-13

## 2023-12-13 NOTE — CARE COORDINATION
Ambulatory Care Coordination Note  2023    Patient Current Location:  Home: [address] [address]     ACM contacted the patient by telephone. Verified name and  with patient as identifiers. Provided introduction to self, and explanation of the ACM role. Challenges to be reviewed by the provider   Additional needs identified to be addressed with provider: No  none               Method of communication with provider: none. ACM: Maricruz Madsen RN    CC f/u completed. Pt was pleasant and engaged with no s/s of disease exacerbation. Pt taking medications as prescribed. Pt states that symbicort will not be covered by insurance at the end of the year. Pt was unsure what to do, as insurance was unclear in options that would be covered. ACM educated on how to get answers to questions w/insurance and pt will call back next day. Pt still is noncompliant with vitals. ACM will continue to educate and encourage pt to check vitals. Plan   F/U 2 weeks  Review Education  Encourage RPM and or daily vitals  SDOH  AD  Care Gaps    Offered patient enrollment in the Remote Patient Monitoring (RPM) program for in-home monitoring: Patient declined. Lab Results       None            Care Coordination Interventions    Referral from Primary Care Provider: No  Suggested Interventions and Community Resources  Zone Management Tools: In Process  Other Services or Interventions: zones CHF, COPD, HTN          Goals Addressed                   This Visit's Progress     Medication Management   On track     I will take my medication as directed. I will notify my provider of any problems with medications, like adverse effects or side effects. I will notify my provider/Care Coordinator if I am unable to afford my medications. I will notify my provider for advice before I stop taking any of my medication.     Barriers: lack of motivation and lack of support  Plan for overcoming my barriers: support and

## 2023-12-28 ENCOUNTER — CARE COORDINATION (OUTPATIENT)
Dept: CARE COORDINATION | Age: 83
End: 2023-12-28

## 2023-12-28 NOTE — CARE COORDINATION
Ambulatory Care Coordination Note  2023    Patient Current Location:  Home: [address] [place] [id number]     ACM contacted the patient by telephone. Verified name and  with patient as identifiers. Provided introduction to self, and explanation of the ACM role. Challenges to be reviewed by the provider   Additional needs identified to be addressed with provider: No  none               Method of communication with provider: chart routing. ACM: Wilian Wood RN    CC f/u completed. Pt was pleasant and engaged with no s/s of disease exacerbation. Pt taking medications as prescribed. Pt states he is healing well and that rib pain has resolved. Pt plans on going on a cruise next month. Pt weight himself every 3 days, states weight has been stable, currently at 194. Pt has no PRN ACM needs at this time. Plan   F/U 3 weeks  Review COPD education  AD  Care Gaps  Encourage pt to check vitals or enroll in RPM    Offered patient enrollment in the Remote Patient Monitoring (RPM) program for in-home monitoring: Patient declined. Lab Results       None            Care Coordination Interventions    Referral from Primary Care Provider: No  Suggested Interventions and Community Resources  Zone Management Tools: In Process  Other Services or Interventions: zones CHF, COPD, HTN          Goals Addressed                   This Visit's Progress     Medication Management   On track     I will take my medication as directed. I will notify my provider of any problems with medications, like adverse effects or side effects. I will notify my provider/Care Coordinator if I am unable to afford my medications. I will notify my provider for advice before I stop taking any of my medication.     Barriers: lack of motivation and lack of support  Plan for overcoming my barriers: support and resources  Confidence: 9/10  Anticipated Goal Completion Date: 2024              Future Appointments   Date Time

## 2024-01-17 PROCEDURE — 93296 REM INTERROG EVL PM/IDS: CPT | Performed by: INTERNAL MEDICINE

## 2024-01-17 PROCEDURE — 93295 DEV INTERROG REMOTE 1/2/MLT: CPT | Performed by: INTERNAL MEDICINE

## 2024-01-18 ENCOUNTER — CARE COORDINATION (OUTPATIENT)
Dept: CARE COORDINATION | Age: 84
End: 2024-01-18

## 2024-01-18 NOTE — CARE COORDINATION
LPN CC unable to reach patient for care coordination outreach. Left HIPAA compliant VM with reason for call & contact information.  Darline Bazzi LPN CC  Care Transitions  [phone]

## 2024-01-22 RX ORDER — APIXABAN 5 MG/1
5 TABLET, FILM COATED ORAL 2 TIMES DAILY
Qty: 180 TABLET | Refills: 3 | Status: SHIPPED | OUTPATIENT
Start: 2024-01-22

## 2024-01-22 NOTE — TELEPHONE ENCOUNTER
Patient needs Eliquis refill approved before he goes out of town on 1/25.    Dmitriy's callback: [phone]

## 2024-02-01 ENCOUNTER — CARE COORDINATION (OUTPATIENT)
Dept: CARE COORDINATION | Age: 84
End: 2024-02-01

## 2024-02-01 NOTE — CARE COORDINATION
ACM outreach completed r/t CC Outreach.  2nd outreach attempt.  HIPAA compliant message left.  ACM will continue to follow and will attempt to reach patient at a later time.

## 2024-02-08 ENCOUNTER — OFFICE VISIT (OUTPATIENT)
Dept: PULMONOLOGY | Age: 84
End: 2024-02-08
Payer: MEDICARE

## 2024-02-08 VITALS
OXYGEN SATURATION: 93 % | RESPIRATION RATE: 18 BRPM | WEIGHT: 193.6 LBS | BODY MASS INDEX: 27.72 KG/M2 | SYSTOLIC BLOOD PRESSURE: 128 MMHG | HEIGHT: 70 IN | HEART RATE: 82 BPM | DIASTOLIC BLOOD PRESSURE: 82 MMHG | TEMPERATURE: 97.5 F

## 2024-02-08 DIAGNOSIS — R76.8 ELEVATED IGE LEVEL: ICD-10-CM

## 2024-02-08 DIAGNOSIS — J45.50 SEVERE PERSISTENT ASTHMA WITHOUT COMPLICATION: Primary | ICD-10-CM

## 2024-02-08 PROBLEM — E11.9 TYPE 2 DIABETES MELLITUS (HCC): Status: ACTIVE | Noted: 2024-02-08

## 2024-02-08 PROCEDURE — G8417 CALC BMI ABV UP PARAM F/U: HCPCS | Performed by: INTERNAL MEDICINE

## 2024-02-08 PROCEDURE — G8484 FLU IMMUNIZE NO ADMIN: HCPCS | Performed by: INTERNAL MEDICINE

## 2024-02-08 PROCEDURE — 1123F ACP DISCUSS/DSCN MKR DOCD: CPT | Performed by: INTERNAL MEDICINE

## 2024-02-08 PROCEDURE — 99213 OFFICE O/P EST LOW 20 MIN: CPT | Performed by: INTERNAL MEDICINE

## 2024-02-08 PROCEDURE — 3079F DIAST BP 80-89 MM HG: CPT | Performed by: INTERNAL MEDICINE

## 2024-02-08 PROCEDURE — G8427 DOCREV CUR MEDS BY ELIG CLIN: HCPCS | Performed by: INTERNAL MEDICINE

## 2024-02-08 PROCEDURE — 1036F TOBACCO NON-USER: CPT | Performed by: INTERNAL MEDICINE

## 2024-02-08 PROCEDURE — 3074F SYST BP LT 130 MM HG: CPT | Performed by: INTERNAL MEDICINE

## 2024-02-08 RX ORDER — FLUTICASONE PROPIONATE AND SALMETEROL 250; 50 UG/1; UG/1
1 POWDER RESPIRATORY (INHALATION) EVERY 12 HOURS
Qty: 3 EACH | Refills: 3 | Status: SHIPPED | OUTPATIENT
Start: 2024-02-08

## 2024-02-08 ASSESSMENT — ASTHMA QUESTIONNAIRES
QUESTION_4 LAST FOUR WEEKS HOW OFTEN HAVE YOU USED YOUR RESCUE INHALER OR NEBULIZER MEDICATION (SUCH AS ALBUTEROL): 5
QUESTION_1 LAST FOUR WEEKS HOW MUCH OF THE TIME DID YOUR ASTHMA KEEP YOU FROM GETTING AS MUCH DONE AT WORK, SCHOOL OR AT HOME: 4
QUESTION_2 LAST FOUR WEEKS HOW OFTEN HAVE YOU HAD SHORTNESS OF BREATH: 3
QUESTION_3 LAST FOUR WEEKS HOW OFTEN DID YOUR ASTHMA SYMPTOMS (WHEEZING, COUGHING, SHORTNESS OF BREATH, CHEST TIGHTNESS OR PAIN) WAKE YOU UP AT NIGHT OR EARLIER THAN USUAL IN THE MORNING: 5
QUESTION_5 LAST FOUR WEEKS HOW WOULD YOU RATE YOUR ASTHMA CONTROL: 3
ACT_TOTALSCORE: 20

## 2024-02-08 NOTE — PROGRESS NOTES
Chief complaint  This is a 83 y.o. year old male  who comes to see me with a chief complaint of   Chief Complaint   Patient presents with    Follow-up    Asthma       HPI  Here with a cc of asthma    Doing well.  Due to change from symbicort to advair due to insurance.  No other changes.  Albuterol use is rare.  Just came back from cruise and doing well.  Has been off theophylline for some time.  No worse for the wear         Current Outpatient Medications:     ELIQUIS 5 MG TABS tablet, TAKE 1 TABLET BY MOUTH TWICE DAILY, Disp: 180 tablet, Rfl: 3    lisinopril (PRINIVIL;ZESTRIL) 5 MG tablet, TAKE 1 TABLET BY MOUTH DAILY, Disp: 90 tablet, Rfl: 3    citalopram (CELEXA) 40 MG tablet, TAKE 1 TABLET BY MOUTH EVERY NIGHT, Disp: 90 tablet, Rfl: 2    atorvastatin (LIPITOR) 40 MG tablet, TAKE 1 TABLET BY MOUTH AT BEDTIME, Disp: 90 tablet, Rfl: 1    empagliflozin (JARDIANCE) 10 MG tablet, Take 1 tablet by mouth daily, Disp: 90 tablet, Rfl: 3    budesonide-formoterol (SYMBICORT) 160-4.5 MCG/ACT AERO, Inhale 2 puffs into the lungs 2 times daily, Disp: 10.2 g, Rfl: 11    amitriptyline (ELAVIL) 25 MG tablet, TAKE 1 TABLET BY MOUTH EVERY NIGHT AT BEDTIME, Disp: 90 tablet, Rfl: 5    SYMBICORT 160-4.5 MCG/ACT AERO, Inhale 2 puffs into the lungs 2 times daily, Disp: 1 each, Rfl: 11    levothyroxine (SYNTHROID) 50 MCG tablet, TAKE 1 TABLET BY MOUTH EVERY NIGHT, Disp: 90 tablet, Rfl: 3    carvedilol (COREG) 12.5 MG tablet, TAKE 1 TABLET BY MOUTH TWICE DAILY WITH MEALS, Disp: 180 tablet, Rfl: 3    omeprazole (PRILOSEC) 40 MG delayed release capsule, Take 1 capsule by mouth every morning (before breakfast), Disp: 90 capsule, Rfl: 1    albuterol sulfate  (90 Base) MCG/ACT inhaler, Inhale 2 puffs into the lungs every 6 hours as needed, Disp: , Rfl:     acetaminophen (TYLENOL) 500 MG tablet, Take 1 tablet by mouth 4 times daily as needed for Pain, Disp: 28 tablet, Rfl: 0      PHYSICAL EXAM:  Vitals:    02/08/24 0827   BP: 128/82

## 2024-02-16 ENCOUNTER — CARE COORDINATION (OUTPATIENT)
Dept: CASE MANAGEMENT | Age: 84
End: 2024-02-16

## 2024-02-16 NOTE — CARE COORDINATION
Ambulatory Care Coordination Note  2024    Patient Current Location:  Home: [address] [place] [id number]     LPN CC contacted the patient by telephone. Verified name and  with patient as identifiers. Provided introduction to self, and explanation of the LPN CC role.     Challenges to be reviewed by the provider   Additional needs identified to be addressed with provider: No  none               Method of communication with provider: none.    ACM: Lashawn Rosas LPN    Patient stated he is fine. Denies cp, sob, cough or swelling. Appetite and fluid intake is good. B&B wnl. Taking medication as prescribed. He stated he had an appointment with his doctor and he is doing good. SDOH completed. No needs or concerns.     Plan:  F/u in 2 weeks  Assess needs    Offered patient enrollment in the Remote Patient Monitoring (RPM) program for in-home monitoring:  previous decline .    Lab Results       None            Care Coordination Interventions    Referral from Primary Care Provider: No  Suggested Interventions and Community Resources  Zone Management Tools: In Process  Other Services or Interventions: zones CHF, COPD, HTN          Goals Addressed    None         Future Appointments   Date Time Provider Department Center   2024  8:20 AM Sean Salas MD [facility]   2024  8:00 AM Stevie Knight MD [facility]   2024  8:00 AM Jay Frey DO [facility]   ,   Congestive Heart Failure Assessment    Are you currently restricting fluids?: No Restriction  Do you understand a low sodium diet?: Yes  Do you understand how to read food labels?: Yes  How many restaurant meals do you eat per week?: 1-2  Do you salt your food before tasting it?: No         Symptoms:          , and   COPD Assessment    Does the patient understand envrionmental exposure?: Yes  Is the patient able to verbalize Rescue vs. Long Acting medications?: Yes  Does the patient have a nebulizer?:

## 2024-02-29 ENCOUNTER — CARE COORDINATION (OUTPATIENT)
Dept: CARE COORDINATION | Age: 84
End: 2024-02-29

## 2024-03-05 PROBLEM — R25.1 TREMOR: Status: ACTIVE | Noted: 2024-03-05

## 2024-03-06 PROBLEM — J44.9 CHRONIC OBSTRUCTIVE PULMONARY DISEASE, UNSPECIFIED (HCC): Status: ACTIVE | Noted: 2024-03-06

## 2024-03-06 PROBLEM — G20.A1 PARKINSON'S DISEASE: Status: ACTIVE | Noted: 2024-03-06

## 2024-03-21 ENCOUNTER — CARE COORDINATION (OUTPATIENT)
Dept: CARE COORDINATION | Age: 84
End: 2024-03-21

## 2024-04-11 ENCOUNTER — CARE COORDINATION (OUTPATIENT)
Dept: CARE COORDINATION | Age: 84
End: 2024-04-11

## 2024-04-11 NOTE — CARE COORDINATION
Ambulatory Care Coordination Note     4/11/2024 1:32 PM     Patient Current Location:  Home: [address] [place] [id number]     patient  outreach attempt by this ACM today to perform care management follow up . ACM was unable to reach the patient  by telephone today; left voice message requesting a return phone call to this ACM.     Follow Up:   No further Ambulatory Care Management follow-up scheduled at this time.  patient  has Ambulatory Care Manager's contact information for any further questions, concerns or needs.  Outreach x 4 w/no response.

## 2024-06-25 ENCOUNTER — OFFICE VISIT (OUTPATIENT)
Dept: CARDIOLOGY CLINIC | Age: 84
End: 2024-06-25
Payer: MEDICARE

## 2024-06-25 VITALS
WEIGHT: 188 LBS | HEART RATE: 60 BPM | OXYGEN SATURATION: 95 % | HEIGHT: 70 IN | SYSTOLIC BLOOD PRESSURE: 96 MMHG | DIASTOLIC BLOOD PRESSURE: 62 MMHG | BODY MASS INDEX: 26.92 KG/M2

## 2024-06-25 DIAGNOSIS — Z95.810 BIVENTRICULAR ICD (IMPLANTABLE CARDIOVERTER-DEFIBRILLATOR) IN PLACE: ICD-10-CM

## 2024-06-25 DIAGNOSIS — I25.10 CORONARY ARTERY DISEASE INVOLVING NATIVE CORONARY ARTERY OF NATIVE HEART WITHOUT ANGINA PECTORIS: ICD-10-CM

## 2024-06-25 DIAGNOSIS — I48.92 PAROXYSMAL ATRIAL FLUTTER (HCC): ICD-10-CM

## 2024-06-25 DIAGNOSIS — I50.32 HEART FAILURE WITH RECOVERED EJECTION FRACTION (HFRECEF) (HCC): Primary | ICD-10-CM

## 2024-06-25 DIAGNOSIS — I10 ESSENTIAL HYPERTENSION: ICD-10-CM

## 2024-06-25 PROCEDURE — G8427 DOCREV CUR MEDS BY ELIG CLIN: HCPCS | Performed by: INTERNAL MEDICINE

## 2024-06-25 PROCEDURE — 3078F DIAST BP <80 MM HG: CPT | Performed by: INTERNAL MEDICINE

## 2024-06-25 PROCEDURE — 99214 OFFICE O/P EST MOD 30 MIN: CPT | Performed by: INTERNAL MEDICINE

## 2024-06-25 PROCEDURE — G8417 CALC BMI ABV UP PARAM F/U: HCPCS | Performed by: INTERNAL MEDICINE

## 2024-06-25 PROCEDURE — G2211 COMPLEX E/M VISIT ADD ON: HCPCS | Performed by: INTERNAL MEDICINE

## 2024-06-25 PROCEDURE — 3074F SYST BP LT 130 MM HG: CPT | Performed by: INTERNAL MEDICINE

## 2024-06-25 PROCEDURE — 1036F TOBACCO NON-USER: CPT | Performed by: INTERNAL MEDICINE

## 2024-06-25 PROCEDURE — 1123F ACP DISCUSS/DSCN MKR DOCD: CPT | Performed by: INTERNAL MEDICINE

## 2024-07-03 NOTE — PROGRESS NOTES
[facility]      Cardiology Follow Up    Dmitriy Gary  1940 June 25, 2024    Primary care physician: Sean Salas MD  Reason for Referral: CHF    CC: \"My blood pressure is down today\"     HPI:  The patient is 84 y.o. male with a past medical history significant for systolic heart failure s/p BiV ICD, hypertension, coronary artery disease, and COPD who presents today for management of heart failure and newly documented atrial flutter. He presented to the hospital 11/17/20 with complaints of progressive shortness of breath and also having a sore throat and headaches. There was no initial obvious etiology of his shortness of breath but because his breathing appeared still labored in the emergency department he was admitted to the hospital. His troponin was normal. His proBNP was only 62. He has a history of a nonischemic cardiomyopathy s/p BiV ICD but his ejection fraction normalized. He previously followed with [facility] cardiology. He returned home from a cruise on 1/28/23 and reported feeling palpitations, chest pains, and worsening shortness of breath. He was admitted with a COPD exacerbation and Covid-19. He was treated with steroids and discharged home. His device interrogation 2/23/23 showed newly documented on atrial flutter with RVR at times (but did not correlate with times of palpitations). He was started on Eliquis 5 mg BID. Today he presents for follow up and states that overall he is feeling well. He does note that his blood pressure is \"down\" and is concerned about this. He states he does not check his blood pressure at home. He denies any lightheadedness, dizziness, falls, or syncopal episodes. He denies any new sounding cardiac complaints. He denies any chest pains or worsening shortness of breath. He has chronic STANLEY but denies any acute changes. He continues to follow with Dr. Frey. He reports medication compliance and is tolerating. He denies any abnormal bleeding  Next visit 8/21/24 with Verenice

## 2024-08-08 ENCOUNTER — OFFICE VISIT (OUTPATIENT)
Dept: PULMONOLOGY | Age: 84
End: 2024-08-08
Payer: MEDICARE

## 2024-08-08 VITALS
TEMPERATURE: 97.4 F | HEART RATE: 60 BPM | DIASTOLIC BLOOD PRESSURE: 62 MMHG | HEIGHT: 70 IN | OXYGEN SATURATION: 93 % | BODY MASS INDEX: 26.97 KG/M2 | RESPIRATION RATE: 18 BRPM | WEIGHT: 188.4 LBS | SYSTOLIC BLOOD PRESSURE: 110 MMHG

## 2024-08-08 DIAGNOSIS — J45.50 SEVERE PERSISTENT ASTHMA WITHOUT COMPLICATION: Primary | ICD-10-CM

## 2024-08-08 DIAGNOSIS — R76.8 ELEVATED IGE LEVEL: ICD-10-CM

## 2024-08-08 PROCEDURE — 1036F TOBACCO NON-USER: CPT | Performed by: INTERNAL MEDICINE

## 2024-08-08 PROCEDURE — 3078F DIAST BP <80 MM HG: CPT | Performed by: INTERNAL MEDICINE

## 2024-08-08 PROCEDURE — 1123F ACP DISCUSS/DSCN MKR DOCD: CPT | Performed by: INTERNAL MEDICINE

## 2024-08-08 PROCEDURE — G8417 CALC BMI ABV UP PARAM F/U: HCPCS | Performed by: INTERNAL MEDICINE

## 2024-08-08 PROCEDURE — 99213 OFFICE O/P EST LOW 20 MIN: CPT | Performed by: INTERNAL MEDICINE

## 2024-08-08 PROCEDURE — G8427 DOCREV CUR MEDS BY ELIG CLIN: HCPCS | Performed by: INTERNAL MEDICINE

## 2024-08-08 PROCEDURE — 3074F SYST BP LT 130 MM HG: CPT | Performed by: INTERNAL MEDICINE

## 2024-08-08 NOTE — PROGRESS NOTES
Chief complaint  This is a 84 y.o. year old male  who comes to see me with a chief complaint of   Chief Complaint   Patient presents with    Follow-up    Asthma       HPI  Here with a cc of asthma    Doing fine.  On advair qd and rarely has to use albuterol.  Avoids the heat so that is why things are doing well.  He was worked up for parkinson's disease but was told he did not have it.  Just a tremor.        Current Outpatient Medications:     carbidopa-levodopa (SINEMET)  MG per tablet, TAKE 1 TABLET BY MOUTH THREE TIMES DAILY, Disp: 270 tablet, Rfl: 3    atorvastatin (LIPITOR) 40 MG tablet, TAKE 1 TABLET BY MOUTH DAILY AT BEDTIME, Disp: 90 tablet, Rfl: 1    carvedilol (COREG) 12.5 MG tablet, TAKE 1 TABLET BY MOUTH TWICE DAILY WITH MEALS, Disp: 180 tablet, Rfl: 3    levothyroxine (SYNTHROID) 50 MCG tablet, TAKE 1 TABLET BY MOUTH EVERY NIGHT, Disp: 90 tablet, Rfl: 3    fluticasone-salmeterol (ADVAIR DISKUS) 250-50 MCG/ACT AEPB diskus inhaler, Inhale 1 puff into the lungs in the morning and 1 puff in the evening., Disp: 3 each, Rfl: 3    ELIQUIS 5 MG TABS tablet, TAKE 1 TABLET BY MOUTH TWICE DAILY, Disp: 180 tablet, Rfl: 3    lisinopril (PRINIVIL;ZESTRIL) 5 MG tablet, TAKE 1 TABLET BY MOUTH DAILY, Disp: 90 tablet, Rfl: 3    citalopram (CELEXA) 40 MG tablet, TAKE 1 TABLET BY MOUTH EVERY NIGHT, Disp: 90 tablet, Rfl: 2    empagliflozin (JARDIANCE) 10 MG tablet, Take 1 tablet by mouth daily, Disp: 90 tablet, Rfl: 3    amitriptyline (ELAVIL) 25 MG tablet, TAKE 1 TABLET BY MOUTH EVERY NIGHT AT BEDTIME, Disp: 90 tablet, Rfl: 5    omeprazole (PRILOSEC) 40 MG delayed release capsule, Take 1 capsule by mouth every morning (before breakfast), Disp: 90 capsule, Rfl: 1    albuterol sulfate  (90 Base) MCG/ACT inhaler, Inhale 2 puffs into the lungs every 6 hours as needed, Disp: , Rfl:     acetaminophen (TYLENOL) 500 MG tablet, Take 1 tablet by mouth 4 times daily as needed for Pain, Disp: 28 tablet, Rfl:

## 2024-12-05 RX ORDER — EMPAGLIFLOZIN 10 MG/1
10 TABLET, FILM COATED ORAL DAILY
Qty: 90 TABLET | Refills: 3 | Status: SHIPPED | OUTPATIENT
Start: 2024-12-05

## 2024-12-05 NOTE — TELEPHONE ENCOUNTER
Last OV: 6/25/24  Next OV: 12/17/24  Last refill: 12/11/23 #90 3 R/F  Most recent Labs: 11/1/24  Last EKG (if needed): 12/11/23

## 2024-12-16 NOTE — PROGRESS NOTES
[facility]      Cardiology Follow Up    Dmitriy Gary  1940 December 17, 2024    Primary care physician: Sean Salas MD  Reason for Referral: CHF    CC: \"Doing OK\"    HPI:  The patient is 84 y.o. male with a past medical history significant for systolic heart failure s/p BiV ICD, hypertension, coronary artery disease, and COPD who presents today for management of heart failure and atrial flutter. He presented to the hospital 11/17/20 with complaints of progressive shortness of breath and also having a sore throat and headaches. There was no initial obvious etiology of his shortness of breath but because his breathing appeared still labored in the emergency department he was admitted to the hospital. His troponin was normal. His proBNP was only 62. He has a history of a nonischemic cardiomyopathy s/p BiV ICD but his ejection fraction normalized. He previously followed with [facility] cardiology. He returned home from a cruise on 1/28/23 and reported feeling palpitations, chest pains, and worsening shortness of breath. He was admitted with a COPD exacerbation and Covid-19. He was treated with steroids and discharged home. His device interrogation 2/23/23 showed newly documented on atrial flutter with RVR at times (but did not correlate with times of palpitations). He was started on Eliquis 5 mg BID.     Today he presents for follow up and states that overall he is feeling well. He denies any lightheadedness, falls, or syncopal episodes. He denies any new sounding cardiac complaints. He denies any chest pains or worsening shortness of breath. He has chronic STANLEY but denies any acute changes. He continues to follow with Dr. Frey. He reports medication compliance and is tolerating. He denies any abnormal bleeding or bruising. He denies exertional chest pain/pressure, dyspnea at rest, worsening STANLEY, PND, orthopnea, palpitations, lightheadedness, weight changes, changes in LE edema, and

## 2024-12-17 ENCOUNTER — OFFICE VISIT (OUTPATIENT)
Dept: CARDIOLOGY CLINIC | Age: 84
End: 2024-12-17
Payer: MEDICARE

## 2024-12-17 VITALS
SYSTOLIC BLOOD PRESSURE: 112 MMHG | HEART RATE: 85 BPM | WEIGHT: 189 LBS | BODY MASS INDEX: 27.06 KG/M2 | HEIGHT: 70 IN | OXYGEN SATURATION: 92 % | DIASTOLIC BLOOD PRESSURE: 76 MMHG

## 2024-12-17 DIAGNOSIS — I10 ESSENTIAL HYPERTENSION: ICD-10-CM

## 2024-12-17 DIAGNOSIS — Z95.810 BIVENTRICULAR ICD (IMPLANTABLE CARDIOVERTER-DEFIBRILLATOR) IN PLACE: ICD-10-CM

## 2024-12-17 DIAGNOSIS — I25.10 CORONARY ARTERY DISEASE INVOLVING NATIVE CORONARY ARTERY OF NATIVE HEART WITHOUT ANGINA PECTORIS: ICD-10-CM

## 2024-12-17 DIAGNOSIS — I48.92 PAROXYSMAL ATRIAL FLUTTER (HCC): ICD-10-CM

## 2024-12-17 DIAGNOSIS — I50.32 HEART FAILURE WITH RECOVERED EJECTION FRACTION (HFRECEF) (HCC): Primary | ICD-10-CM

## 2024-12-17 PROCEDURE — 1036F TOBACCO NON-USER: CPT | Performed by: INTERNAL MEDICINE

## 2024-12-17 PROCEDURE — 1123F ACP DISCUSS/DSCN MKR DOCD: CPT | Performed by: INTERNAL MEDICINE

## 2024-12-17 PROCEDURE — G8417 CALC BMI ABV UP PARAM F/U: HCPCS | Performed by: INTERNAL MEDICINE

## 2024-12-17 PROCEDURE — 93000 ELECTROCARDIOGRAM COMPLETE: CPT | Performed by: INTERNAL MEDICINE

## 2024-12-17 PROCEDURE — 3074F SYST BP LT 130 MM HG: CPT | Performed by: INTERNAL MEDICINE

## 2024-12-17 PROCEDURE — G8484 FLU IMMUNIZE NO ADMIN: HCPCS | Performed by: INTERNAL MEDICINE

## 2024-12-17 PROCEDURE — 3078F DIAST BP <80 MM HG: CPT | Performed by: INTERNAL MEDICINE

## 2024-12-17 PROCEDURE — 1159F MED LIST DOCD IN RCRD: CPT | Performed by: INTERNAL MEDICINE

## 2024-12-17 PROCEDURE — 99214 OFFICE O/P EST MOD 30 MIN: CPT | Performed by: INTERNAL MEDICINE

## 2024-12-17 PROCEDURE — G8427 DOCREV CUR MEDS BY ELIG CLIN: HCPCS | Performed by: INTERNAL MEDICINE

## 2025-01-09 ENCOUNTER — TELEPHONE (OUTPATIENT)
Dept: PULMONOLOGY | Age: 85
End: 2025-01-09

## 2025-01-09 ENCOUNTER — OFFICE VISIT (OUTPATIENT)
Dept: PULMONOLOGY | Age: 85
End: 2025-01-09
Payer: MEDICARE

## 2025-01-09 VITALS
BODY MASS INDEX: 27.06 KG/M2 | HEIGHT: 70 IN | OXYGEN SATURATION: 98 % | WEIGHT: 189 LBS | RESPIRATION RATE: 18 BRPM | SYSTOLIC BLOOD PRESSURE: 118 MMHG | DIASTOLIC BLOOD PRESSURE: 72 MMHG | TEMPERATURE: 97.6 F | HEART RATE: 67 BPM

## 2025-01-09 DIAGNOSIS — J45.50 SEVERE PERSISTENT ASTHMA WITHOUT COMPLICATION: ICD-10-CM

## 2025-01-09 DIAGNOSIS — J18.9 PNEUMONIA DUE TO INFECTIOUS ORGANISM, UNSPECIFIED LATERALITY, UNSPECIFIED PART OF LUNG: ICD-10-CM

## 2025-01-09 DIAGNOSIS — S22.49XK: Primary | ICD-10-CM

## 2025-01-09 PROCEDURE — 3074F SYST BP LT 130 MM HG: CPT | Performed by: INTERNAL MEDICINE

## 2025-01-09 PROCEDURE — G8427 DOCREV CUR MEDS BY ELIG CLIN: HCPCS | Performed by: INTERNAL MEDICINE

## 2025-01-09 PROCEDURE — 99214 OFFICE O/P EST MOD 30 MIN: CPT | Performed by: INTERNAL MEDICINE

## 2025-01-09 PROCEDURE — 1159F MED LIST DOCD IN RCRD: CPT | Performed by: INTERNAL MEDICINE

## 2025-01-09 PROCEDURE — 3078F DIAST BP <80 MM HG: CPT | Performed by: INTERNAL MEDICINE

## 2025-01-09 PROCEDURE — G8417 CALC BMI ABV UP PARAM F/U: HCPCS | Performed by: INTERNAL MEDICINE

## 2025-01-09 PROCEDURE — 1123F ACP DISCUSS/DSCN MKR DOCD: CPT | Performed by: INTERNAL MEDICINE

## 2025-01-09 PROCEDURE — 1036F TOBACCO NON-USER: CPT | Performed by: INTERNAL MEDICINE

## 2025-01-09 PROCEDURE — G2211 COMPLEX E/M VISIT ADD ON: HCPCS | Performed by: INTERNAL MEDICINE

## 2025-01-09 RX ORDER — LEVOFLOXACIN 500 MG/1
500 TABLET, FILM COATED ORAL DAILY
Qty: 7 TABLET | Refills: 0 | Status: SHIPPED | OUTPATIENT
Start: 2025-01-09 | End: 2025-01-16

## 2025-01-09 RX ORDER — PREDNISONE 10 MG/1
TABLET ORAL
Qty: 30 TABLET | Refills: 0 | Status: SHIPPED | OUTPATIENT
Start: 2025-01-09

## 2025-01-09 NOTE — PROGRESS NOTES
Chief complaint  This is a 84 y.o. year old male  who comes to see me with a chief complaint of   Chief Complaint   Patient presents with    Follow-up       HPI  Here with a cc of SOb    Fell on face and broke ribs.  Bad bruise on face and hands.  Cough with congestion.  He has been in pain and not able to cough up mucus.  Afraid to cough due to pain.  Does not want to take pain meds due to fear of constipation          Current Outpatient Medications:     levoFLOXacin (LEVAQUIN) 500 MG tablet, Take 1 tablet by mouth daily for 7 days, Disp: 7 tablet, Rfl: 0    predniSONE (DELTASONE) 10 MG tablet, 40mg for 3days 30mg for 3days 20mg for 3days 10mg for 3days, Disp: 30 tablet, Rfl: 0    atorvastatin (LIPITOR) 40 MG tablet, TAKE 1 TABLET BY MOUTH DAILY AT BEDTIME, Disp: 90 tablet, Rfl: 1    lisinopril (PRINIVIL;ZESTRIL) 5 MG tablet, TAKE 1 TABLET BY MOUTH DAILY, Disp: 90 tablet, Rfl: 3    JARDIANCE 10 MG tablet, TAKE 1 TABLET BY MOUTH DAILY, Disp: 90 tablet, Rfl: 3    amitriptyline (ELAVIL) 25 MG tablet, TAKE 1 TABLET BY MOUTH EVERY NIGHT AT BEDTIME, Disp: 90 tablet, Rfl: 5    citalopram (CELEXA) 40 MG tablet, TAKE 1 TABLET BY MOUTH EVERY NIGHT, Disp: 90 tablet, Rfl: 2    carvedilol (COREG) 12.5 MG tablet, TAKE 1 TABLET BY MOUTH TWICE DAILY WITH MEALS, Disp: 180 tablet, Rfl: 3    levothyroxine (SYNTHROID) 50 MCG tablet, TAKE 1 TABLET BY MOUTH EVERY NIGHT, Disp: 90 tablet, Rfl: 3    fluticasone-salmeterol (ADVAIR DISKUS) 250-50 MCG/ACT AEPB diskus inhaler, Inhale 1 puff into the lungs in the morning and 1 puff in the evening., Disp: 3 each, Rfl: 3    ELIQUIS 5 MG TABS tablet, TAKE 1 TABLET BY MOUTH TWICE DAILY, Disp: 180 tablet, Rfl: 3    omeprazole (PRILOSEC) 40 MG delayed release capsule, Take 1 capsule by mouth every morning (before breakfast), Disp: 90 capsule, Rfl: 1    albuterol sulfate  (90 Base) MCG/ACT inhaler, Inhale 2 puffs into the lungs every 6 hours as needed, Disp: , Rfl:     acetaminophen

## 2025-01-15 ENCOUNTER — TELEPHONE (OUTPATIENT)
Dept: CARDIOLOGY CLINIC | Age: 85
End: 2025-01-15

## 2025-01-15 NOTE — TELEPHONE ENCOUNTER
Called spoke with patient states he stopped taking Eliquis 5 mg on 1/4/25.     Patient would like Dr Knight to let him know when to resume medication.     Patient has been off 12 days so far.

## 2025-01-15 NOTE — TELEPHONE ENCOUNTER
Dmitriy was seen at [facility] 1/4-1/7 due to fall, he was told to hold his Eliquis and resume 2 weeks after discharge. Dmitriy wants to make sure from a cardio standpoint if it is OK to resume. Or is appt necessary?    Please advise    Dmitriy's callback: [phone]

## 2025-01-29 ENCOUNTER — OFFICE VISIT (OUTPATIENT)
Dept: PULMONOLOGY | Age: 85
End: 2025-01-29
Payer: MEDICARE

## 2025-01-29 VITALS
HEIGHT: 70 IN | BODY MASS INDEX: 27.09 KG/M2 | SYSTOLIC BLOOD PRESSURE: 105 MMHG | TEMPERATURE: 97.2 F | WEIGHT: 189.2 LBS | HEART RATE: 68 BPM | DIASTOLIC BLOOD PRESSURE: 66 MMHG | OXYGEN SATURATION: 93 % | RESPIRATION RATE: 18 BRPM

## 2025-01-29 DIAGNOSIS — J45.50 SEVERE PERSISTENT ASTHMA WITHOUT COMPLICATION: ICD-10-CM

## 2025-01-29 DIAGNOSIS — S22.49XK: ICD-10-CM

## 2025-01-29 DIAGNOSIS — J18.9 PNEUMONIA DUE TO INFECTIOUS ORGANISM, UNSPECIFIED LATERALITY, UNSPECIFIED PART OF LUNG: Primary | ICD-10-CM

## 2025-01-29 PROCEDURE — G8417 CALC BMI ABV UP PARAM F/U: HCPCS | Performed by: INTERNAL MEDICINE

## 2025-01-29 PROCEDURE — 99214 OFFICE O/P EST MOD 30 MIN: CPT | Performed by: INTERNAL MEDICINE

## 2025-01-29 PROCEDURE — 1123F ACP DISCUSS/DSCN MKR DOCD: CPT | Performed by: INTERNAL MEDICINE

## 2025-01-29 PROCEDURE — 1159F MED LIST DOCD IN RCRD: CPT | Performed by: INTERNAL MEDICINE

## 2025-01-29 PROCEDURE — G2211 COMPLEX E/M VISIT ADD ON: HCPCS | Performed by: INTERNAL MEDICINE

## 2025-01-29 PROCEDURE — 1036F TOBACCO NON-USER: CPT | Performed by: INTERNAL MEDICINE

## 2025-01-29 PROCEDURE — G8427 DOCREV CUR MEDS BY ELIG CLIN: HCPCS | Performed by: INTERNAL MEDICINE

## 2025-01-29 PROCEDURE — 3078F DIAST BP <80 MM HG: CPT | Performed by: INTERNAL MEDICINE

## 2025-01-29 PROCEDURE — 3074F SYST BP LT 130 MM HG: CPT | Performed by: INTERNAL MEDICINE

## 2025-01-29 RX ORDER — SODIUM CHLORIDE FOR INHALATION 3 %
4 VIAL, NEBULIZER (ML) INHALATION 2 TIMES DAILY
Qty: 240 ML | Refills: 3 | Status: SHIPPED | OUTPATIENT
Start: 2025-01-29

## 2025-01-29 RX ORDER — ALBUTEROL SULFATE 0.83 MG/ML
2.5 SOLUTION RESPIRATORY (INHALATION) EVERY 4 HOURS PRN
Qty: 360 ML | Refills: 3 | Status: SHIPPED | OUTPATIENT
Start: 2025-01-29

## 2025-01-29 NOTE — PATIENT INSTRUCTIONS
Use saline and albuterol first thing in morning and right before bedtime    If needed, can use albuterol and saline during the day

## 2025-01-29 NOTE — PROGRESS NOTES
tablet, Take 1 tablet by mouth 4 times daily as needed for Pain, Disp: 28 tablet, Rfl: 0      PHYSICAL EXAM:  Vitals:    01/29/25 1058   BP: 105/66   Pulse: 68   Resp: 18   Temp: 97.2 °F (36.2 °C)   SpO2: 93%           GENERAL:  Facial bruising   HEENT:  No scleral icterus, no conjunctival irritation.  Mallampati IV  NECK:  No thyromegaly, no bruits.    LYMPH:  No cervical or supraclavicular adenopathy  HEART:  Regular rate and rhythm, no murmurs  LUNGS:  Diminished, less rhonchi  ABDOMEN:  No distention, no organomegaly  EXTREMITIES:  No edema, no digital clubbing  NEURO:  No localizing deficits, CN II-XII intact    Pulmonary Function Testing Completed at outside hospital  Overall outside pulm notes suggest severe obstruction with bronchodilator response but missing a lot of information     Chest imaging from 1/23 is reviewed.  My interpretation is atelectasis likely     1/21  Total IgE Level:  376  Significant allergies:  Multiple           2/8/2024     8:29 AM 8/9/2023     8:39 AM 3/2/2023     7:58 AM 5/23/2022     7:59 AM 6/23/2021     7:50 AM 3/9/2021     7:53 AM   ASTHMA CONTROL TEST   In the past 4 weeks, how much of the time did your asthma keep you from getting as much done at work, school or at home? 4 4 3 4 5 4   During the past 4 weeks, how often have you had shortness of breath? 3 4 3 4 3 3   During the past 4 weeks, how often did your asthma symptoms (wheezing, coughing, shortness of breath, chest tightness or pain) wake you up at night or earlier than usual in the morning? 5 5 4 3 5 4   During the past 4 weeks, how often have you used your rescue inhaler or nebulizer medication (such as albuterol)? 5 2 3 5 4 2   How would you rate your asthma control during the past 4 weeks? 3 4 3 4 4 3   Asthma Control Test Total Score 20 19 16 20 21 16       I reviewed above labs and studies pertinent to this visit and date    Assessment/Plan:  1. Pneumonia due to infectious organism, unspecified laterality,

## 2025-01-31 ENCOUNTER — HOSPITAL ENCOUNTER (OUTPATIENT)
Dept: CT IMAGING | Age: 85
Discharge: HOME | End: 2025-01-31
Attending: INTERNAL MEDICINE
Payer: MEDICARE

## 2025-01-31 DIAGNOSIS — S22.49XK: ICD-10-CM

## 2025-01-31 PROCEDURE — 71250 CT THORAX DX C-: CPT

## 2025-02-03 ENCOUNTER — TELEPHONE (OUTPATIENT)
Dept: PULMONOLOGY | Age: 85
End: 2025-02-03

## 2025-02-03 NOTE — TELEPHONE ENCOUNTER
Pt's daughter called and is asking if pt should be mixing the albuterol and sodium chloride together, taking them separate or does it matter.

## 2025-02-10 RX ORDER — APIXABAN 5 MG/1
5 TABLET, FILM COATED ORAL 2 TIMES DAILY
Qty: 180 TABLET | Refills: 3 | Status: SHIPPED | OUTPATIENT
Start: 2025-02-10

## 2025-02-10 NOTE — TELEPHONE ENCOUNTER
Last OV: 12/17/24  Next OV: 6/23/25  Last refill: 1/22/24 #180 3 R/F  Most recent Labs: 11/1/24  Last EKG (if needed):12/17/24

## 2025-02-11 PROBLEM — S32.040D COMPRESSION FRACTURE OF L4 VERTEBRA WITH ROUTINE HEALING: Status: ACTIVE | Noted: 2025-02-11

## 2025-02-11 PROBLEM — S22.42XD CLOSED FRACTURE OF MULTIPLE RIBS OF LEFT SIDE WITH ROUTINE HEALING: Status: ACTIVE | Noted: 2025-02-11

## 2025-02-11 PROBLEM — S00.83XA FACIAL CONTUSION: Status: ACTIVE | Noted: 2025-02-11

## 2025-02-19 PROBLEM — S09.90XA HEAD TRAUMA: Status: ACTIVE | Noted: 2025-02-19

## 2025-03-03 DIAGNOSIS — J45.50 SEVERE PERSISTENT ASTHMA WITHOUT COMPLICATION (HCC): ICD-10-CM

## 2025-03-03 RX ORDER — FLUTICASONE PROPIONATE AND SALMETEROL 250; 50 UG/1; UG/1
POWDER RESPIRATORY (INHALATION)
Qty: 3 EACH | Refills: 3 | Status: SHIPPED | OUTPATIENT
Start: 2025-03-03

## 2025-04-03 ENCOUNTER — OFFICE VISIT (OUTPATIENT)
Dept: PULMONOLOGY | Age: 85
End: 2025-04-03
Payer: MEDICARE

## 2025-04-03 VITALS
DIASTOLIC BLOOD PRESSURE: 66 MMHG | TEMPERATURE: 97.1 F | WEIGHT: 180.8 LBS | RESPIRATION RATE: 18 BRPM | SYSTOLIC BLOOD PRESSURE: 105 MMHG | HEART RATE: 73 BPM | BODY MASS INDEX: 25.88 KG/M2 | HEIGHT: 70 IN | OXYGEN SATURATION: 96 %

## 2025-04-03 DIAGNOSIS — S22.49XK: Primary | ICD-10-CM

## 2025-04-03 DIAGNOSIS — J45.50 SEVERE PERSISTENT ASTHMA WITHOUT COMPLICATION (HCC): ICD-10-CM

## 2025-04-03 PROCEDURE — 99214 OFFICE O/P EST MOD 30 MIN: CPT | Performed by: INTERNAL MEDICINE

## 2025-04-03 PROCEDURE — 3078F DIAST BP <80 MM HG: CPT | Performed by: INTERNAL MEDICINE

## 2025-04-03 PROCEDURE — G8427 DOCREV CUR MEDS BY ELIG CLIN: HCPCS | Performed by: INTERNAL MEDICINE

## 2025-04-03 PROCEDURE — 1123F ACP DISCUSS/DSCN MKR DOCD: CPT | Performed by: INTERNAL MEDICINE

## 2025-04-03 PROCEDURE — 1036F TOBACCO NON-USER: CPT | Performed by: INTERNAL MEDICINE

## 2025-04-03 PROCEDURE — G8417 CALC BMI ABV UP PARAM F/U: HCPCS | Performed by: INTERNAL MEDICINE

## 2025-04-03 PROCEDURE — 3074F SYST BP LT 130 MM HG: CPT | Performed by: INTERNAL MEDICINE

## 2025-04-03 PROCEDURE — G2211 COMPLEX E/M VISIT ADD ON: HCPCS | Performed by: INTERNAL MEDICINE

## 2025-04-03 PROCEDURE — 1159F MED LIST DOCD IN RCRD: CPT | Performed by: INTERNAL MEDICINE

## 2025-04-03 NOTE — PATIENT INSTRUCTIONS
Continue with advair twice a day    Use albuterol when needed.  Can use it if you get short of breath    Follow up in 4-6 months

## 2025-04-03 NOTE — PROGRESS NOTES
Chief complaint  This is a 84 y.o. year old male  who comes to see me with a chief complaint of   Chief Complaint   Patient presents with    Follow-up    Pneumonia    Asthma       HPI  Here with a cc of cough and rib fractures    Pain has improved with regards to his rib fractures.  Has cut back on nebs due to not needing it any further.  Symptoms improved in March.  He is back to near baseline.  SOB with activity which has been chronic.  He is able to feel back to normal          Current Outpatient Medications:     levothyroxine (SYNTHROID) 50 MCG tablet, TAKE 1 TABLET BY MOUTH EVERY NIGHT, Disp: 90 tablet, Rfl: 3    fluticasone-salmeterol (ADVAIR) 250-50 MCG/ACT AEPB diskus inhaler, INHALE 1 PUFF INTO THE LUNGS IN THE MORNING AND IN THE EVENING, Disp: 3 each, Rfl: 3    ELIQUIS 5 MG TABS tablet, TAKE 1 TABLET BY MOUTH TWICE DAILY, Disp: 180 tablet, Rfl: 3    albuterol (PROVENTIL) (2.5 MG/3ML) 0.083% nebulizer solution, Take 3 mLs by nebulization every 4 hours as needed for Wheezing, Disp: 360 mL, Rfl: 3    sodium chloride, Inhalant, 3 % nebulizer solution, Take 4 mLs by nebulization 2 times daily, Disp: 240 mL, Rfl: 3    predniSONE (DELTASONE) 10 MG tablet, 40mg for 3days 30mg for 3days 20mg for 3days 10mg for 3days, Disp: 30 tablet, Rfl: 0    atorvastatin (LIPITOR) 40 MG tablet, TAKE 1 TABLET BY MOUTH DAILY AT BEDTIME, Disp: 90 tablet, Rfl: 1    lisinopril (PRINIVIL;ZESTRIL) 5 MG tablet, TAKE 1 TABLET BY MOUTH DAILY, Disp: 90 tablet, Rfl: 3    JARDIANCE 10 MG tablet, TAKE 1 TABLET BY MOUTH DAILY, Disp: 90 tablet, Rfl: 3    amitriptyline (ELAVIL) 25 MG tablet, TAKE 1 TABLET BY MOUTH EVERY NIGHT AT BEDTIME, Disp: 90 tablet, Rfl: 5    citalopram (CELEXA) 40 MG tablet, TAKE 1 TABLET BY MOUTH EVERY NIGHT, Disp: 90 tablet, Rfl: 2    carvedilol (COREG) 12.5 MG tablet, TAKE 1 TABLET BY MOUTH TWICE DAILY WITH MEALS, Disp: 180 tablet, Rfl: 3    omeprazole (PRILOSEC) 40 MG delayed release capsule, Take 1 capsule by mouth

## 2025-06-19 NOTE — PROGRESS NOTES
[facility]      Cardiology Follow Up    Dmitriy Gary  1940 June 19, 2025    Primary care physician: Sean Salas MD  Reason for Referral: CHF    CC: \"    HPI:  The patient is 85 y.o. male with a past medical history significant for systolic heart failure s/p BiV ICD, hypertension, coronary artery disease, and COPD who presents today for management of heart failure and atrial flutter. He presented to the hospital 11/17/20 with complaints of progressive shortness of breath and also having a sore throat and headaches. There was no initial obvious etiology of his shortness of breath but because his breathing appeared still labored in the emergency department he was admitted to the hospital. His troponin was normal. His proBNP was only 62. He has a history of a nonischemic cardiomyopathy s/p BiV ICD but his ejection fraction normalized. He previously followed with [facility] cardiology. He returned home from a cruise on 1/28/23 and reported feeling palpitations, chest pains, and worsening shortness of breath. He was admitted with a COPD exacerbation and Covid-19. He was treated with steroids and discharged home. His device interrogation 2/23/23 showed newly documented on atrial flutter with RVR at times (but did not correlate with times of palpitations). He was started on Eliquis 5 mg BID.     Today he presents for follow up           Past Medical History:   Diagnosis Date    AICD (automatic cardioverter/defibrillator) present     Anxiety and depression     Arthritis     Back pain     CHF (congestive heart failure) (HCC)     GERD (gastroesophageal reflux disease)     History of blood transfusion 2005    Hyperlipidemia     Hypertension     Hypothyroidism     hypothyroidism    S/P cardiac cath 06/2009    Nl LVEF and NL cors/ chronic LBBB now paced    Sleep apnea     pt does not use Cpap machine    Type 2 diabetes mellitus 2/8/2024    Wears glasses      Past Surgical History:   Procedure

## 2025-06-20 NOTE — PROGRESS NOTES
[facility]      Cardiology Follow Up    Dmitriy Gary  1940 June 23, 2025    Primary care physician: Sean Salas MD  Reason for Referral: CHF    CC: \"I was diagnosed with Parkinson's.\"    HPI:  The patient is 85 y.o. male with a past medical history significant for systolic heart failure s/p BiV ICD, hypertension, coronary artery disease, and COPD who presents today for management of heart failure and atrial flutter. He presented to the hospital 11/17/20 with complaints of progressive shortness of breath and also having a sore throat and headaches. There was no initial obvious etiology of his shortness of breath but because his breathing appeared still labored in the emergency department he was admitted to the hospital. His troponin was normal. His proBNP was only 62. He has a history of a nonischemic cardiomyopathy s/p BiV ICD but his ejection fraction normalized. He previously followed with [facility] cardiology. He returned home from a cruise on 1/28/23 and reported feeling palpitations, chest pains, and worsening shortness of breath. He was admitted with a COPD exacerbation and Covid-19. He was treated with steroids and discharged home. His device interrogation 2/23/23 showed newly documented on atrial flutter with RVR at times (but did not correlate with times of palpitations). He was started on Eliquis 5 mg BID. Today he presents for follow up and he denies any new sounding cardiac complaints. He was recently diagnosed with Parkinson's and was started on Sinemet. Patient denies exertional chest pain/pressure, dyspnea at rest, worsening STANLEY, PND, orthopnea, palpitations, lightheadedness, weight changes, changes in LE edema, and syncope. Patient reports compliance to his medications.     Past Medical History:   Diagnosis Date    AICD (automatic cardioverter/defibrillator) present     Anxiety and depression     Arthritis     Back pain     CHF (congestive heart failure) (Formerly [facility])

## 2025-06-23 ENCOUNTER — OFFICE VISIT (OUTPATIENT)
Dept: CARDIOLOGY CLINIC | Age: 85
End: 2025-06-23
Payer: MEDICARE

## 2025-06-23 VITALS
BODY MASS INDEX: 26.96 KG/M2 | WEIGHT: 182 LBS | DIASTOLIC BLOOD PRESSURE: 60 MMHG | HEART RATE: 60 BPM | SYSTOLIC BLOOD PRESSURE: 94 MMHG | HEIGHT: 69 IN | OXYGEN SATURATION: 95 %

## 2025-06-23 DIAGNOSIS — Z95.810 BIVENTRICULAR ICD (IMPLANTABLE CARDIOVERTER-DEFIBRILLATOR) IN PLACE: ICD-10-CM

## 2025-06-23 DIAGNOSIS — E78.2 MIXED HYPERLIPIDEMIA: ICD-10-CM

## 2025-06-23 DIAGNOSIS — I25.10 CORONARY ARTERY DISEASE INVOLVING NATIVE CORONARY ARTERY OF NATIVE HEART WITHOUT ANGINA PECTORIS: ICD-10-CM

## 2025-06-23 DIAGNOSIS — I48.92 PAROXYSMAL ATRIAL FLUTTER (HCC): ICD-10-CM

## 2025-06-23 DIAGNOSIS — I50.32 HEART FAILURE WITH RECOVERED EJECTION FRACTION (HFRECEF) (HCC): Primary | ICD-10-CM

## 2025-06-23 DIAGNOSIS — I10 ESSENTIAL HYPERTENSION: ICD-10-CM

## 2025-06-23 PROCEDURE — 1036F TOBACCO NON-USER: CPT | Performed by: INTERNAL MEDICINE

## 2025-06-23 PROCEDURE — G2211 COMPLEX E/M VISIT ADD ON: HCPCS | Performed by: INTERNAL MEDICINE

## 2025-06-23 PROCEDURE — 3074F SYST BP LT 130 MM HG: CPT | Performed by: INTERNAL MEDICINE

## 2025-06-23 PROCEDURE — 1123F ACP DISCUSS/DSCN MKR DOCD: CPT | Performed by: INTERNAL MEDICINE

## 2025-06-23 PROCEDURE — 99214 OFFICE O/P EST MOD 30 MIN: CPT | Performed by: INTERNAL MEDICINE

## 2025-06-23 PROCEDURE — G8427 DOCREV CUR MEDS BY ELIG CLIN: HCPCS | Performed by: INTERNAL MEDICINE

## 2025-06-23 PROCEDURE — 3078F DIAST BP <80 MM HG: CPT | Performed by: INTERNAL MEDICINE

## 2025-06-23 PROCEDURE — G8417 CALC BMI ABV UP PARAM F/U: HCPCS | Performed by: INTERNAL MEDICINE

## 2025-06-23 PROCEDURE — 1159F MED LIST DOCD IN RCRD: CPT | Performed by: INTERNAL MEDICINE

## 2025-06-23 RX ORDER — CARBIDOPA AND LEVODOPA 25; 100 MG/1; MG/1
TABLET ORAL
COMMUNITY
Start: 2025-05-16

## 2025-06-25 ENCOUNTER — CARE COORDINATION (OUTPATIENT)
Dept: CARE COORDINATION | Age: 85
End: 2025-06-25

## 2025-06-25 NOTE — CARE COORDINATION
Ambulatory Care Coordination Note     2025 2:19 PM     Patient Current Location:  Home: [address] [place] [id number]     This patient was received as a referral from Ambulatory Care Manager .    ACM contacted the patient by telephone. Verified name and  with patient as identifiers. Provided introduction to self, and explanation of the ACM role.   Patient accepted care management services at this time.          ACM: Zora Rosenthal RN     Challenges to be reviewed by the provider   Additional needs identified to be addressed with provider Yes  Pt seen by Neuro has new dx of Parkinson's.  Pt plans to follow up w/Neuro, has an appt on .               Method of communication with provider: chart routing.    Utilization: Patient has not had any utilization since our last call.     Care Summary Note: ACM spoke to pt who is agreeable to ongoing care management.  Pt taking meds as prescribed, no s/s of disease exacerbation.  Pt is independent in ADL's, weighs himself weekly.  Pt saw Neuro and has new dx of Parkinson's.  Pt has f/u  w/[facility].  PCP notified as an FYI.  Education for Parkinsons, CHF and HTN sent via regular mail and [email or web address].  Pt declining COA ref;  Pt declining RPM.  Pt weighs himself weekly.    Offered patient enrollment in the Remote Patient Monitoring (RPM) program for in-home monitoring: Yes, but did not enroll at this time: declined to enroll in the program becausehe feels he does not need daily monitoring.     Assessments Completed:       2025     1:48 PM   Amb Fall Risk Assessment and TUG Test   Do you feel unsteady or are you worried about falling?  no   2 or more falls in past year? no   Fall with injury in past year? no    ,   Ambulatory Care Coordination Assessment    Care Coordination Protocol  Referral from Primary Care Provider: No  Week 1 - Initial Assessment     Do you have all of your prescriptions and are they filled?: Yes  Barriers to

## 2025-07-09 ENCOUNTER — CARE COORDINATION (OUTPATIENT)
Dept: CARE COORDINATION | Age: 85
End: 2025-07-09

## 2025-07-09 SDOH — SOCIAL STABILITY: SOCIAL INSECURITY: ARE YOU MARRIED, WIDOWED, DIVORCED, SEPARATED, NEVER MARRIED, OR LIVING WITH A PARTNER?: WIDOWED

## 2025-07-09 SDOH — SOCIAL STABILITY: SOCIAL NETWORK: HOW OFTEN DO YOU ATTEND CHURCH OR RELIGIOUS SERVICES?: MORE THAN 4 TIMES PER YEAR

## 2025-07-09 SDOH — ECONOMIC STABILITY: TRANSPORTATION INSECURITY: IN THE PAST 12 MONTHS, HAS LACK OF TRANSPORTATION KEPT YOU FROM MEDICAL APPOINTMENTS OR FROM GETTING MEDICATIONS?: NO

## 2025-07-09 SDOH — HEALTH STABILITY: MENTAL HEALTH: HOW OFTEN DO YOU HAVE A DRINK CONTAINING ALCOHOL?: NEVER

## 2025-07-09 SDOH — ECONOMIC STABILITY: HOUSING INSECURITY: IN THE LAST 12 MONTHS, WAS THERE A TIME WHEN YOU WERE NOT ABLE TO PAY THE MORTGAGE OR RENT ON TIME?: NO

## 2025-07-09 SDOH — ECONOMIC STABILITY: FOOD INSECURITY: WITHIN THE PAST 12 MONTHS, YOU WORRIED THAT YOUR FOOD WOULD RUN OUT BEFORE YOU GOT THE MONEY TO BUY MORE.: NEVER TRUE

## 2025-07-09 SDOH — SOCIAL STABILITY: SOCIAL NETWORK
IN A TYPICAL WEEK, HOW MANY TIMES DO YOU TALK ON THE PHONE WITH FAMILY, FRIENDS, OR NEIGHBORS?: MORE THAN THREE TIMES A WEEK

## 2025-07-09 SDOH — ECONOMIC STABILITY: FOOD INSECURITY: HOW HARD IS IT FOR YOU TO PAY FOR THE VERY BASICS LIKE FOOD, HOUSING, MEDICAL CARE, AND HEATING?: NOT HARD AT ALL

## 2025-07-09 SDOH — SOCIAL STABILITY: SOCIAL NETWORK: HOW OFTEN DO YOU ATTEND MEETINGS OF THE CLUBS OR ORGANIZATIONS YOU BELONG TO?: MORE THAN 4 TIMES PER YEAR

## 2025-07-09 SDOH — HEALTH STABILITY: MENTAL HEALTH: HOW MANY DRINKS CONTAINING ALCOHOL DO YOU HAVE ON A TYPICAL DAY WHEN YOU ARE DRINKING?: PATIENT DOES NOT DRINK

## 2025-07-09 SDOH — ECONOMIC STABILITY: FOOD INSECURITY: WITHIN THE PAST 12 MONTHS, THE FOOD YOU BOUGHT JUST DIDN'T LAST AND YOU DIDN'T HAVE MONEY TO GET MORE.: NEVER TRUE

## 2025-07-09 SDOH — SOCIAL STABILITY: SOCIAL NETWORK
DO YOU BELONG TO ANY CLUBS OR ORGANIZATIONS SUCH AS CHURCH GROUPS, UNIONS, FRATERNAL OR ATHLETIC GROUPS, OR SCHOOL GROUPS?: NO

## 2025-07-09 SDOH — SOCIAL STABILITY: SOCIAL NETWORK: HOW OFTEN DO YOU GET TOGETHER WITH FRIENDS OR RELATIVES?: MORE THAN THREE TIMES A WEEK

## 2025-07-09 SDOH — HEALTH STABILITY: PHYSICAL HEALTH: ON AVERAGE, HOW MANY DAYS PER WEEK DO YOU ENGAGE IN MODERATE TO STRENUOUS EXERCISE (LIKE A BRISK WALK)?: 7 DAYS

## 2025-07-09 SDOH — HEALTH STABILITY: PHYSICAL HEALTH: ON AVERAGE, HOW MANY MINUTES DO YOU ENGAGE IN EXERCISE AT THIS LEVEL?: 10 MIN

## 2025-07-09 ASSESSMENT — ACTIVITIES OF DAILY LIVING (ADL): LACK_OF_TRANSPORTATION: NO

## 2025-07-09 NOTE — CARE COORDINATION
Ambulatory Care Coordination Note     2025 1:38 PM     Patient Current Location:  Home: [address] [place] [id number]     ACM contacted the patient by telephone. Verified name and  with patient as identifiers.         ACM: Zora Rosenthal RN     Challenges to be reviewed by the provider   Additional needs identified to be addressed with provider No                 Method of communication with provider: chart routing.    Utilization: Patient has not had any utilization since our last call.     Care Summary Note: CC f/u completed.  Pt was pleasant and engaged with no s/s of disease exacerbation.  Pt taking medications as prescribed.   SDOH completed, no new needs identified.  Pt has support from family.  Pt w c/o of dizziness as he increased his Sinemet to 2 pills, now decreased to 1.5 per discussion w/neuro.  Pt continues to abstain from ETOH.  Pt has no PRN ACM needs at this time.    Offered patient enrollment in the Remote Patient Monitoring (RPM) program for in-home monitoring: Yes, but did not enroll at this time: already monitoring with home equipment.     Assessments Completed:   Congestive Heart Failure Assessment    Are you currently restricting fluids?: No Restriction  Do you understand a low sodium diet?: Yes  Do you understand how to read food labels?: Yes  How many restaurant meals do you eat per week?: 1-2  Do you salt your food before tasting it?: No     No patient-reported symptoms      Symptoms:  None: Yes      Symptom course: stable  Patient-reported weight (lb): 186  Weight trend: stable  Salt intake watch compared to last visit: stable      ,   Hypertension - Encounter Level              Medications Reviewed:   Patient denies any changes with medications and reports taking all medications as prescribed.    Advance Care Planning:   Not reviewed during this call     Care Planning:   Education Documentation  Influenza Vaccine, taught by Zora Rosenthal, RN at 2025  1:38

## 2025-08-08 ENCOUNTER — CARE COORDINATION (OUTPATIENT)
Dept: CARE COORDINATION | Age: 85
End: 2025-08-08

## 2025-08-29 ENCOUNTER — CARE COORDINATION (OUTPATIENT)
Dept: CARE COORDINATION | Age: 85
End: 2025-08-29

## (undated) DEVICE — CONTAINER SPEC 480ML CLR POLYSTYR 10% NEUT BUFF FRMLN ZN

## (undated) DEVICE — GUIDEWIRE WITH SPRING TIP - SINGLE USE: Brand: SAFEGUIDE®

## (undated) DEVICE — BITE BLOCK ENDOSCP AD 60 FR W/ ADJ STRP PLAS GRN BLOX

## (undated) DEVICE — FORCEPS BX 240CM 2.4MM L NDL RAD JAW 4 M00513334

## (undated) DEVICE — ENDOSCOPY KIT: Brand: MEDLINE INDUSTRIES, INC.